# Patient Record
Sex: MALE | Race: WHITE | NOT HISPANIC OR LATINO | URBAN - METROPOLITAN AREA
[De-identification: names, ages, dates, MRNs, and addresses within clinical notes are randomized per-mention and may not be internally consistent; named-entity substitution may affect disease eponyms.]

---

## 2018-10-25 ENCOUNTER — INPATIENT (INPATIENT)
Facility: HOSPITAL | Age: 68
LOS: 0 days | Discharge: HOME | End: 2018-10-26
Admitting: INTERNAL MEDICINE
Payer: MEDICARE

## 2018-10-25 PROCEDURE — 92941 PRQ TRLML REVSC TOT OCCL AMI: CPT | Mod: LC

## 2018-10-25 PROCEDURE — 71275 CT ANGIOGRAPHY CHEST: CPT | Mod: 26

## 2018-10-25 PROCEDURE — 71045 X-RAY EXAM CHEST 1 VIEW: CPT | Mod: 26

## 2018-10-25 PROCEDURE — 74175 CTA ABDOMEN W/CONTRAST: CPT | Mod: 26

## 2018-10-25 PROCEDURE — 99285 EMERGENCY DEPT VISIT HI MDM: CPT

## 2018-10-25 PROCEDURE — 99223 1ST HOSP IP/OBS HIGH 75: CPT

## 2018-10-25 PROCEDURE — 93459 L HRT ART/GRFT ANGIO: CPT | Mod: 26,XU

## 2018-10-26 ENCOUNTER — OUTPATIENT (OUTPATIENT)
Dept: OUTPATIENT SERVICES | Facility: HOSPITAL | Age: 68
LOS: 1 days | End: 2018-10-26

## 2018-10-26 PROCEDURE — 99233 SBSQ HOSP IP/OBS HIGH 50: CPT

## 2018-10-29 ENCOUNTER — NON-APPOINTMENT (OUTPATIENT)
Age: 68
End: 2018-10-29

## 2018-10-29 ENCOUNTER — APPOINTMENT (OUTPATIENT)
Dept: CARDIOLOGY | Facility: CLINIC | Age: 68
End: 2018-10-29
Payer: MEDICARE

## 2018-10-29 VITALS
SYSTOLIC BLOOD PRESSURE: 130 MMHG | WEIGHT: 210 LBS | BODY MASS INDEX: 31.83 KG/M2 | DIASTOLIC BLOOD PRESSURE: 70 MMHG | HEIGHT: 68 IN | HEART RATE: 77 BPM

## 2018-10-29 PROCEDURE — 99214 OFFICE O/P EST MOD 30 MIN: CPT

## 2018-10-29 PROCEDURE — 93000 ELECTROCARDIOGRAM COMPLETE: CPT

## 2018-10-30 ENCOUNTER — RECORD ABSTRACTING (OUTPATIENT)
Age: 68
End: 2018-10-30

## 2018-11-08 ENCOUNTER — OUTPATIENT (OUTPATIENT)
Dept: OUTPATIENT SERVICES | Facility: HOSPITAL | Age: 68
LOS: 1 days | Discharge: HOME | End: 2018-11-08

## 2018-11-12 ENCOUNTER — APPOINTMENT (OUTPATIENT)
Dept: CARDIOLOGY | Facility: CLINIC | Age: 68
End: 2018-11-12
Payer: MEDICARE

## 2018-11-12 PROCEDURE — 93306 TTE W/DOPPLER COMPLETE: CPT

## 2018-11-13 ENCOUNTER — APPOINTMENT (OUTPATIENT)
Dept: CARDIOLOGY | Facility: CLINIC | Age: 68
End: 2018-11-13
Payer: MEDICARE

## 2018-11-13 VITALS
HEART RATE: 87 BPM | WEIGHT: 205 LBS | SYSTOLIC BLOOD PRESSURE: 138 MMHG | BODY MASS INDEX: 31.07 KG/M2 | DIASTOLIC BLOOD PRESSURE: 70 MMHG | HEIGHT: 68 IN | OXYGEN SATURATION: 98 %

## 2018-11-13 PROCEDURE — 99214 OFFICE O/P EST MOD 30 MIN: CPT

## 2018-11-13 PROCEDURE — 93351 STRESS TTE COMPLETE: CPT

## 2018-11-13 NOTE — HISTORY OF PRESENT ILLNESS
[FreeTextEntry1] : CAD: Patient a marked clinical benefit from coronary stenting. He continues to feel well. His good exercise ability without exertional symptoms.\par \par Hypertension: Well controlled.\par \par Hyperlipidemia: The patient states he is taking statin therapy. Will review the bottles

## 2018-11-13 NOTE — PHYSICAL EXAM
[General Appearance - Well Developed] : well developed [Normal Appearance] : normal appearance [Well Groomed] : well groomed [General Appearance - Well Nourished] : well nourished [No Deformities] : no deformities [General Appearance - In No Acute Distress] : no acute distress [Normal Conjunctiva] : the conjunctiva exhibited no abnormalities [Eyelids - No Xanthelasma] : the eyelids demonstrated no xanthelasmas [Normal Oral Mucosa] : normal oral mucosa [No Oral Pallor] : no oral pallor [No Oral Cyanosis] : no oral cyanosis [Normal Jugular Venous A Waves Present] : normal jugular venous A waves present [Normal Jugular Venous V Waves Present] : normal jugular venous V waves present [No Jugular Venous Lopez A Waves] : no jugular venous lopez A waves [] : no respiratory distress [Respiration, Rhythm And Depth] : normal respiratory rhythm and effort [Exaggerated Use Of Accessory Muscles For Inspiration] : no accessory muscle use [Auscultation Breath Sounds / Voice Sounds] : lungs were clear to auscultation bilaterally [Heart Rate And Rhythm] : heart rate and rhythm were normal [Heart Sounds] : normal S1 and S2 [Murmurs] : no murmurs present

## 2019-03-07 ENCOUNTER — CLINICAL ADVICE (OUTPATIENT)
Age: 69
End: 2019-03-07

## 2019-03-09 PROCEDURE — 71045 X-RAY EXAM CHEST 1 VIEW: CPT | Mod: 26

## 2019-03-09 PROCEDURE — 99285 EMERGENCY DEPT VISIT HI MDM: CPT

## 2019-03-10 ENCOUNTER — OUTPATIENT (OUTPATIENT)
Dept: OUTPATIENT SERVICES | Facility: HOSPITAL | Age: 69
LOS: 1 days | End: 2019-03-10

## 2019-03-10 ENCOUNTER — INPATIENT (INPATIENT)
Facility: HOSPITAL | Age: 69
LOS: 1 days | Discharge: HOME | End: 2019-03-12
Payer: MEDICARE

## 2019-03-10 PROCEDURE — 99223 1ST HOSP IP/OBS HIGH 75: CPT

## 2019-03-11 PROCEDURE — 93459 L HRT ART/GRFT ANGIO: CPT | Mod: 26,XU

## 2019-03-11 PROCEDURE — 93010 ELECTROCARDIOGRAM REPORT: CPT

## 2019-03-11 PROCEDURE — 92941 PRQ TRLML REVSC TOT OCCL AMI: CPT | Mod: LC

## 2019-03-12 PROCEDURE — 93010 ELECTROCARDIOGRAM REPORT: CPT

## 2019-03-12 PROCEDURE — 99232 SBSQ HOSP IP/OBS MODERATE 35: CPT

## 2019-03-18 ENCOUNTER — APPOINTMENT (OUTPATIENT)
Dept: CARDIOLOGY | Facility: CLINIC | Age: 69
End: 2019-03-18
Payer: MEDICARE

## 2019-03-18 ENCOUNTER — NON-APPOINTMENT (OUTPATIENT)
Age: 69
End: 2019-03-18

## 2019-03-18 VITALS
SYSTOLIC BLOOD PRESSURE: 126 MMHG | WEIGHT: 205 LBS | HEART RATE: 82 BPM | DIASTOLIC BLOOD PRESSURE: 72 MMHG | BODY MASS INDEX: 31.07 KG/M2 | HEIGHT: 68 IN | OXYGEN SATURATION: 97 %

## 2019-03-18 PROCEDURE — 93000 ELECTROCARDIOGRAM COMPLETE: CPT

## 2019-03-18 PROCEDURE — 99214 OFFICE O/P EST MOD 30 MIN: CPT

## 2019-03-18 NOTE — HISTORY OF PRESENT ILLNESS
[FreeTextEntry1] : CAD: There's been no recurrence of chest discomfort and coronary stenting. The need for compliance of the left physical therapy has been reviewed.\par \par Hyperlipidemia on patient is tolerating statin therapy with difficulty.\par \par Shortness of breath: None recently.\par \par Hypertension: Well controlled

## 2019-03-26 ENCOUNTER — APPOINTMENT (OUTPATIENT)
Dept: CARDIOLOGY | Facility: CLINIC | Age: 69
End: 2019-03-26

## 2019-04-02 ENCOUNTER — OUTPATIENT (OUTPATIENT)
Dept: OUTPATIENT SERVICES | Facility: HOSPITAL | Age: 69
LOS: 1 days | Discharge: HOME | End: 2019-04-02

## 2019-04-22 ENCOUNTER — EMERGENCY (EMERGENCY)
Facility: HOSPITAL | Age: 69
LOS: 1 days | End: 2019-04-22
Admitting: EMERGENCY MEDICINE
Payer: MEDICARE

## 2019-04-22 PROCEDURE — 71045 X-RAY EXAM CHEST 1 VIEW: CPT | Mod: 26

## 2019-04-22 PROCEDURE — 99285 EMERGENCY DEPT VISIT HI MDM: CPT

## 2019-04-22 PROCEDURE — 93010 ELECTROCARDIOGRAM REPORT: CPT | Mod: 76

## 2019-04-29 ENCOUNTER — APPOINTMENT (OUTPATIENT)
Dept: CARDIOLOGY | Facility: CLINIC | Age: 69
End: 2019-04-29
Payer: MEDICARE

## 2019-04-29 VITALS
SYSTOLIC BLOOD PRESSURE: 128 MMHG | HEART RATE: 89 BPM | OXYGEN SATURATION: 98 % | HEIGHT: 68 IN | BODY MASS INDEX: 31.83 KG/M2 | WEIGHT: 210 LBS | DIASTOLIC BLOOD PRESSURE: 70 MMHG

## 2019-04-29 PROCEDURE — 99214 OFFICE O/P EST MOD 30 MIN: CPT

## 2019-04-29 NOTE — HISTORY OF PRESENT ILLNESS
[FreeTextEntry1] : CAD: The patient underwent fairly recent coronary stenting for an unstable angina pattern. In addition the patient has a several years approximately one angina. Now the patient is in rehabilitation. His unstable angina symptoms have not reoccurred. The patient developed exertional angina typical for his baseline state for several years. He was seen in the emergency department. Serial enzymes were negative for myocardial infarction. Today we decided to increase his metoprolol tartrate 25 b.i.d. up to 50 mg p.o. b.i.d.  Pt. reports angina worse after meals, limits functional status after meal walks w friends.\par \par Chol:  cont atorvastatin 80\par \par HTN:  wel controlled

## 2019-05-21 ENCOUNTER — APPOINTMENT (OUTPATIENT)
Dept: CARDIOLOGY | Facility: CLINIC | Age: 69
End: 2019-05-21

## 2019-06-03 ENCOUNTER — APPOINTMENT (OUTPATIENT)
Dept: CARDIOLOGY | Facility: CLINIC | Age: 69
End: 2019-06-03
Payer: MEDICARE

## 2019-06-03 VITALS
HEIGHT: 68 IN | OXYGEN SATURATION: 97 % | WEIGHT: 210 LBS | SYSTOLIC BLOOD PRESSURE: 148 MMHG | HEART RATE: 80 BPM | BODY MASS INDEX: 31.83 KG/M2 | DIASTOLIC BLOOD PRESSURE: 60 MMHG

## 2019-06-03 PROCEDURE — 93000 ELECTROCARDIOGRAM COMPLETE: CPT

## 2019-06-03 PROCEDURE — 99215 OFFICE O/P EST HI 40 MIN: CPT

## 2019-06-03 NOTE — PHYSICAL EXAM
[General Appearance - Well Developed] : well developed [Normal Appearance] : normal appearance [Well Groomed] : well groomed [General Appearance - Well Nourished] : well nourished [No Deformities] : no deformities [General Appearance - In No Acute Distress] : no acute distress [Normal Conjunctiva] : the conjunctiva exhibited no abnormalities [Eyelids - No Xanthelasma] : the eyelids demonstrated no xanthelasmas [Normal Oral Mucosa] : normal oral mucosa [Normal Jugular Venous A Waves Present] : normal jugular venous A waves present [No Oral Pallor] : no oral pallor [No Oral Cyanosis] : no oral cyanosis [No Jugular Venous Lopez A Waves] : no jugular venous lopez A waves [Normal Jugular Venous V Waves Present] : normal jugular venous V waves present [Exaggerated Use Of Accessory Muscles For Inspiration] : no accessory muscle use [Respiration, Rhythm And Depth] : normal respiratory rhythm and effort [Auscultation Breath Sounds / Voice Sounds] : lungs were clear to auscultation bilaterally [Heart Rate And Rhythm] : heart rate and rhythm were normal [Heart Sounds] : normal S1 and S2 [Murmurs] : no murmurs present [Abdomen Soft] : soft [Abdomen Tenderness] : non-tender [Abnormal Walk] : normal gait [Abdomen Mass (___ Cm)] : no abdominal mass palpated [Gait - Sufficient For Exercise Testing] : the gait was sufficient for exercise testing [Nail Clubbing] : no clubbing of the fingernails [Petechial Hemorrhages (___cm)] : no petechial hemorrhages [Cyanosis, Localized] : no localized cyanosis [Skin Color & Pigmentation] : normal skin color and pigmentation [] : no rash [No Skin Ulcers] : no skin ulcer [Skin Lesions] : no skin lesions [No Venous Stasis] : no venous stasis [No Xanthoma] : no  xanthoma was observed [Affect] : the affect was normal [Oriented To Time, Place, And Person] : oriented to person, place, and time [Mood] : the mood was normal [No Anxiety] : not feeling anxious

## 2019-06-04 NOTE — ASSESSMENT
[FreeTextEntry1] :  \par Chronic stable angina.  The patient had last cardiac catheterization on March 11, 2019 and underwent OM stenting.  Overall, he does not have any unstable symptoms at this point.  I have added Ranexa 500 mg b.i.d. and reevaluation in about 10 days.  If that improves symptoms, we will continue; otherwise he will be referred back for cardiac catheterization.  If he has no additional lesion, he will be referred for ECP therapy.\par \par Hypertension, well controlled.  Low-salt diet.  Continue current medication.\par \par Dyslipidemia.  Low-cholesterol diet.  Continue current medication.\par \par Diabetes.  Continue current medication; long-term goals of blood pressure less than 130/80, A1c less than 7, LDL less than 70.\par \par Hypothyroidism.  Continue current medication.\par \par Obesity.  Weight reduction by diet and exercise.\par \par Aggressive risk factor modification has been discussed with him at a great length.  He will be reevaluated by me after cardiac testing.\par

## 2019-06-04 NOTE — HISTORY OF PRESENT ILLNESS
[FreeTextEntry1] : HPI:\par Mr. Crocker is a very pleasant 68-year-old gentleman, came for urgent follow up with significant chest pain.  The patient stated that if he walks more than usual, he gets tightness all across the chest which gets better with resting.  Also after large meal, walking is very difficult due to the symptoms.  The symptoms are now very much disabled.  He does not get any chest pain in the resting condition.  He does not get short of breath in the resting condition, but does get shortness of breath on more than usual exertion.  He denies PND, orthopnea, diaphoresis, dizziness, palpitations, or pedal edema.\par \par Recently, he was sent to emergency room for cardiac rehab as he developed exertional chest pain while getting exercise in cardiac rehab.  In the emergency room, workup was negative and he was sent home for outpatient follow up.  His history includes CAD, last cardiac catheterization on March 11, 2019.  He had OM stenting, previously had RCA stenting, previously he had coronary artery bypass graft surgery LIMA to LAD; dyslipidemia, diabetes, obesity, and hypothyroidism.\par

## 2019-06-12 ENCOUNTER — APPOINTMENT (OUTPATIENT)
Dept: CARDIOLOGY | Facility: CLINIC | Age: 69
End: 2019-06-12
Payer: MEDICARE

## 2019-06-12 VITALS
DIASTOLIC BLOOD PRESSURE: 64 MMHG | OXYGEN SATURATION: 95 % | BODY MASS INDEX: 31.52 KG/M2 | SYSTOLIC BLOOD PRESSURE: 138 MMHG | HEIGHT: 68 IN | HEART RATE: 77 BPM | WEIGHT: 208 LBS

## 2019-06-12 PROCEDURE — 99214 OFFICE O/P EST MOD 30 MIN: CPT

## 2019-07-09 ENCOUNTER — MEDICATION RENEWAL (OUTPATIENT)
Age: 69
End: 2019-07-09

## 2019-07-15 ENCOUNTER — APPOINTMENT (OUTPATIENT)
Dept: CARDIOLOGY | Facility: CLINIC | Age: 69
End: 2019-07-15

## 2019-07-17 ENCOUNTER — OUTPATIENT (OUTPATIENT)
Dept: OUTPATIENT SERVICES | Facility: HOSPITAL | Age: 69
LOS: 1 days | End: 2019-07-17

## 2019-07-17 ENCOUNTER — INPATIENT (INPATIENT)
Facility: HOSPITAL | Age: 69
LOS: 0 days | Discharge: HOME | End: 2019-07-18
Attending: INTERNAL MEDICINE | Admitting: INTERNAL MEDICINE
Payer: MEDICARE

## 2019-07-17 PROCEDURE — 93010 ELECTROCARDIOGRAM REPORT: CPT | Mod: 59

## 2019-07-17 PROCEDURE — 99285 EMERGENCY DEPT VISIT HI MDM: CPT

## 2019-07-17 PROCEDURE — 92928 PRQ TCAT PLMT NTRAC ST 1 LES: CPT | Mod: LC

## 2019-07-17 PROCEDURE — 71045 X-RAY EXAM CHEST 1 VIEW: CPT | Mod: 26

## 2019-07-17 PROCEDURE — 93459 L HRT ART/GRFT ANGIO: CPT | Mod: 26,XU

## 2019-07-18 PROCEDURE — 93010 ELECTROCARDIOGRAM REPORT: CPT

## 2019-07-22 ENCOUNTER — APPOINTMENT (OUTPATIENT)
Dept: CARDIOLOGY | Facility: CLINIC | Age: 69
End: 2019-07-22

## 2019-07-23 ENCOUNTER — APPOINTMENT (OUTPATIENT)
Dept: CARDIOLOGY | Facility: CLINIC | Age: 69
End: 2019-07-23
Payer: MEDICARE

## 2019-07-23 ENCOUNTER — NON-APPOINTMENT (OUTPATIENT)
Age: 69
End: 2019-07-23

## 2019-07-23 VITALS
WEIGHT: 207 LBS | HEART RATE: 82 BPM | BODY MASS INDEX: 31.37 KG/M2 | DIASTOLIC BLOOD PRESSURE: 70 MMHG | SYSTOLIC BLOOD PRESSURE: 142 MMHG | HEIGHT: 68 IN | OXYGEN SATURATION: 98 %

## 2019-07-23 PROCEDURE — 93000 ELECTROCARDIOGRAM COMPLETE: CPT

## 2019-07-23 PROCEDURE — 99215 OFFICE O/P EST HI 40 MIN: CPT

## 2019-07-23 RX ORDER — NIFEDIPINE 10 MG/1
10 CAPSULE ORAL DAILY
Qty: 90 | Refills: 2 | Status: DISCONTINUED | COMMUNITY
End: 2019-07-23

## 2019-07-23 RX ORDER — METOPROLOL TARTRATE 50 MG/1
50 TABLET, FILM COATED ORAL TWICE DAILY
Qty: 180 | Refills: 3 | Status: DISCONTINUED | COMMUNITY
Start: 2019-04-29 | End: 2019-07-23

## 2019-07-23 NOTE — HISTORY OF PRESENT ILLNESS
[FreeTextEntry1] : \par he has no chest pain\par He has no shortness of breath\par He has no palpitations\par He has no syncope\par He is neurologically intact\par He has no edema\par He has no GI symptoms\par

## 2019-07-23 NOTE — PHYSICAL EXAM
[Normal Appearance] : normal appearance [General Appearance - Well Developed] : well developed [General Appearance - Well Nourished] : well nourished [Well Groomed] : well groomed [No Deformities] : no deformities [General Appearance - In No Acute Distress] : no acute distress [Normal Conjunctiva] : the conjunctiva exhibited no abnormalities [Eyelids - No Xanthelasma] : the eyelids demonstrated no xanthelasmas [Normal Oral Mucosa] : normal oral mucosa [No Oral Pallor] : no oral pallor [No Oral Cyanosis] : no oral cyanosis [Normal Jugular Venous A Waves Present] : normal jugular venous A waves present [No Jugular Venous Lopez A Waves] : no jugular venous lopez A waves [Normal Jugular Venous V Waves Present] : normal jugular venous V waves present [Auscultation Breath Sounds / Voice Sounds] : lungs were clear to auscultation bilaterally [Exaggerated Use Of Accessory Muscles For Inspiration] : no accessory muscle use [Respiration, Rhythm And Depth] : normal respiratory rhythm and effort [Heart Sounds] : normal S1 and S2 [Heart Rate And Rhythm] : heart rate and rhythm were normal [Murmurs] : no murmurs present [Abdomen Tenderness] : non-tender [Abdomen Soft] : soft [Abdomen Mass (___ Cm)] : no abdominal mass palpated [Abnormal Walk] : normal gait [Nail Clubbing] : no clubbing of the fingernails [Gait - Sufficient For Exercise Testing] : the gait was sufficient for exercise testing [Petechial Hemorrhages (___cm)] : no petechial hemorrhages [Cyanosis, Localized] : no localized cyanosis [Skin Color & Pigmentation] : normal skin color and pigmentation [No Venous Stasis] : no venous stasis [] : no rash [Skin Lesions] : no skin lesions [No Skin Ulcers] : no skin ulcer [No Xanthoma] : no  xanthoma was observed [Oriented To Time, Place, And Person] : oriented to person, place, and time [Affect] : the affect was normal [No Anxiety] : not feeling anxious [Mood] : the mood was normal

## 2019-07-23 NOTE — REASON FOR VISIT
[Hyperlipidemia] : hyperlipidemia [Coronary Artery Disease] : coronary artery disease [Follow-Up - From Hospitalization] : follow-up of a recent hospitalization for [Hypertension] : hypertension [FreeTextEntry1] : I saw this 68-year-old man,in follow up on 07/23/19\par Last week he presented to the hospital with unstable angina, and had a stent to the ostial LCX. He is now pain free.\par \par He was seen on 6/3/19 for significant chest pain.  The patient stated that if he walks more than usual, he gets tightness all across the chest which gets better with resting.  Also after large meal, walking is very difficult due to the symptoms.  The symptoms are now very much disabled.  He does not get any chest pain in the resting condition.  He does not get short of breath in the resting condition, but does get shortness of breath on more than usual exertion.  He denies PND, orthopnea, diaphoresis, dizziness, palpitations, or pedal edema. \par \par He was started on Ranexa and he has significant improvement in his chronic stable angina; he can do all his activity without any CP.\par \par \par His history includes CAD, last cardiac catheterization on March 11, 2019.  He had OM stenting, previously had RCA stenting, previously he had coronary artery bypass graft surgery LIMA to LAD; dyslipidemia, diabetes, obesity, and hypothyroidism.\par

## 2019-07-25 ENCOUNTER — TRANSCRIPTION ENCOUNTER (OUTPATIENT)
Age: 69
End: 2019-07-25

## 2019-07-26 ENCOUNTER — TRANSCRIPTION ENCOUNTER (OUTPATIENT)
Age: 69
End: 2019-07-26

## 2019-09-10 ENCOUNTER — OUTPATIENT (OUTPATIENT)
Dept: OUTPATIENT SERVICES | Facility: HOSPITAL | Age: 69
LOS: 1 days | Discharge: HOME | End: 2019-09-10

## 2019-09-20 ENCOUNTER — APPOINTMENT (OUTPATIENT)
Dept: CARDIOLOGY | Facility: CLINIC | Age: 69
End: 2019-09-20

## 2019-10-03 ENCOUNTER — APPOINTMENT (OUTPATIENT)
Dept: CARDIOLOGY | Facility: CLINIC | Age: 69
End: 2019-10-03
Payer: MEDICARE

## 2019-10-03 ENCOUNTER — NON-APPOINTMENT (OUTPATIENT)
Age: 69
End: 2019-10-03

## 2019-10-03 VITALS
HEART RATE: 86 BPM | HEIGHT: 68 IN | WEIGHT: 199 LBS | SYSTOLIC BLOOD PRESSURE: 136 MMHG | BODY MASS INDEX: 30.16 KG/M2 | OXYGEN SATURATION: 97 % | DIASTOLIC BLOOD PRESSURE: 70 MMHG

## 2019-10-03 PROCEDURE — 93000 ELECTROCARDIOGRAM COMPLETE: CPT

## 2019-10-03 PROCEDURE — 99215 OFFICE O/P EST HI 40 MIN: CPT

## 2019-10-03 RX ORDER — DAPAGLIFLOZIN 10 MG/1
TABLET, FILM COATED ORAL
Refills: 0 | Status: DISCONTINUED | COMMUNITY
End: 2019-10-03

## 2019-10-03 NOTE — PHYSICAL EXAM
[General Appearance - Well Developed] : well developed [Normal Appearance] : normal appearance [Well Groomed] : well groomed [General Appearance - Well Nourished] : well nourished [No Deformities] : no deformities [General Appearance - In No Acute Distress] : no acute distress [Normal Conjunctiva] : the conjunctiva exhibited no abnormalities [Eyelids - No Xanthelasma] : the eyelids demonstrated no xanthelasmas [No Oral Pallor] : no oral pallor [Normal Oral Mucosa] : normal oral mucosa [No Oral Cyanosis] : no oral cyanosis [Normal Jugular Venous V Waves Present] : normal jugular venous V waves present [Normal Jugular Venous A Waves Present] : normal jugular venous A waves present [No Jugular Venous Lopez A Waves] : no jugular venous lopez A waves [Respiration, Rhythm And Depth] : normal respiratory rhythm and effort [Exaggerated Use Of Accessory Muscles For Inspiration] : no accessory muscle use [Auscultation Breath Sounds / Voice Sounds] : lungs were clear to auscultation bilaterally [Heart Sounds] : normal S1 and S2 [Heart Rate And Rhythm] : heart rate and rhythm were normal [Murmurs] : no murmurs present [Abdomen Soft] : soft [Abdomen Tenderness] : non-tender [Abdomen Mass (___ Cm)] : no abdominal mass palpated [Gait - Sufficient For Exercise Testing] : the gait was sufficient for exercise testing [Abnormal Walk] : normal gait [Nail Clubbing] : no clubbing of the fingernails [Cyanosis, Localized] : no localized cyanosis [Petechial Hemorrhages (___cm)] : no petechial hemorrhages [Skin Color & Pigmentation] : normal skin color and pigmentation [] : no rash [No Venous Stasis] : no venous stasis [Skin Lesions] : no skin lesions [No Xanthoma] : no  xanthoma was observed [No Skin Ulcers] : no skin ulcer [Oriented To Time, Place, And Person] : oriented to person, place, and time [Affect] : the affect was normal [Mood] : the mood was normal [No Anxiety] : not feeling anxious

## 2019-10-03 NOTE — DISCUSSION/SUMMARY
[FreeTextEntry1] : Since stenting of the circumflex /left main lesion last week he has been completely pain free.\par We discussed treating his diabetes and cholesterol more aggressively and I added Zetia to his regime.\par He has been having post prandial epigastric symptoms which have been diagnosed as GB stones.\par I have referred him to a Gastroenterologist locally.\par If surgery is required can clear him but he cannot stop the aspirin and plavix.

## 2019-10-14 ENCOUNTER — INPATIENT (INPATIENT)
Facility: HOSPITAL | Age: 69
LOS: 1 days | Discharge: HOME | DRG: 250 | End: 2019-10-16
Attending: HOSPITALIST | Admitting: HOSPITALIST
Payer: MEDICARE

## 2019-10-14 VITALS
WEIGHT: 194.01 LBS | HEART RATE: 110 BPM | HEIGHT: 69 IN | DIASTOLIC BLOOD PRESSURE: 76 MMHG | SYSTOLIC BLOOD PRESSURE: 145 MMHG | OXYGEN SATURATION: 100 % | RESPIRATION RATE: 18 BRPM

## 2019-10-14 DIAGNOSIS — R07.9 CHEST PAIN, UNSPECIFIED: ICD-10-CM

## 2019-10-14 LAB
ALBUMIN SERPL ELPH-MCNC: 4.1 G/DL — SIGNIFICANT CHANGE UP (ref 3.3–5)
ALP SERPL-CCNC: 71 U/L — SIGNIFICANT CHANGE UP (ref 40–120)
ALT FLD-CCNC: 47 U/L — HIGH (ref 10–45)
ANION GAP SERPL CALC-SCNC: 13 MMOL/L — SIGNIFICANT CHANGE UP (ref 5–17)
AST SERPL-CCNC: 25 U/L — SIGNIFICANT CHANGE UP (ref 10–40)
BASOPHILS # BLD AUTO: 0.04 K/UL — SIGNIFICANT CHANGE UP (ref 0–0.2)
BASOPHILS NFR BLD AUTO: 0.8 % — SIGNIFICANT CHANGE UP (ref 0–2)
BILIRUB SERPL-MCNC: 0.5 MG/DL — SIGNIFICANT CHANGE UP (ref 0.2–1.2)
BUN SERPL-MCNC: 22 MG/DL — SIGNIFICANT CHANGE UP (ref 7–23)
CALCIUM SERPL-MCNC: 8.7 MG/DL — SIGNIFICANT CHANGE UP (ref 8.4–10.5)
CHLORIDE SERPL-SCNC: 102 MMOL/L — SIGNIFICANT CHANGE UP (ref 96–108)
CO2 SERPL-SCNC: 23 MMOL/L — SIGNIFICANT CHANGE UP (ref 22–31)
CREAT SERPL-MCNC: 1.13 MG/DL — SIGNIFICANT CHANGE UP (ref 0.5–1.3)
EOSINOPHIL # BLD AUTO: 0.1 K/UL — SIGNIFICANT CHANGE UP (ref 0–0.5)
EOSINOPHIL NFR BLD AUTO: 2 % — SIGNIFICANT CHANGE UP (ref 0–6)
GLUCOSE SERPL-MCNC: 222 MG/DL — HIGH (ref 70–99)
HCT VFR BLD CALC: 40.4 % — SIGNIFICANT CHANGE UP (ref 39–50)
HGB BLD-MCNC: 13.7 G/DL — SIGNIFICANT CHANGE UP (ref 13–17)
IMM GRANULOCYTES NFR BLD AUTO: 0.4 % — SIGNIFICANT CHANGE UP (ref 0–1.5)
LYMPHOCYTES # BLD AUTO: 1.02 K/UL — SIGNIFICANT CHANGE UP (ref 1–3.3)
LYMPHOCYTES # BLD AUTO: 20.4 % — SIGNIFICANT CHANGE UP (ref 13–44)
MCHC RBC-ENTMCNC: 31.2 PG — SIGNIFICANT CHANGE UP (ref 27–34)
MCHC RBC-ENTMCNC: 33.9 GM/DL — SIGNIFICANT CHANGE UP (ref 32–36)
MCV RBC AUTO: 92 FL — SIGNIFICANT CHANGE UP (ref 80–100)
MONOCYTES # BLD AUTO: 0.5 K/UL — SIGNIFICANT CHANGE UP (ref 0–0.9)
MONOCYTES NFR BLD AUTO: 10 % — SIGNIFICANT CHANGE UP (ref 2–14)
NEUTROPHILS # BLD AUTO: 3.33 K/UL — SIGNIFICANT CHANGE UP (ref 1.8–7.4)
NEUTROPHILS NFR BLD AUTO: 66.4 % — SIGNIFICANT CHANGE UP (ref 43–77)
NRBC # BLD: 0 /100 WBCS — SIGNIFICANT CHANGE UP (ref 0–0)
PLATELET # BLD AUTO: 176 K/UL — SIGNIFICANT CHANGE UP (ref 150–400)
POTASSIUM SERPL-MCNC: 3.8 MMOL/L — SIGNIFICANT CHANGE UP (ref 3.5–5.3)
POTASSIUM SERPL-SCNC: 3.8 MMOL/L — SIGNIFICANT CHANGE UP (ref 3.5–5.3)
PROT SERPL-MCNC: 7.1 G/DL — SIGNIFICANT CHANGE UP (ref 6–8.3)
RBC # BLD: 4.39 M/UL — SIGNIFICANT CHANGE UP (ref 4.2–5.8)
RBC # FLD: 12.9 % — SIGNIFICANT CHANGE UP (ref 10.3–14.5)
SODIUM SERPL-SCNC: 138 MMOL/L — SIGNIFICANT CHANGE UP (ref 135–145)
TROPONIN T, HIGH SENSITIVITY RESULT: 94 NG/L — HIGH (ref 0–51)
WBC # BLD: 5.01 K/UL — SIGNIFICANT CHANGE UP (ref 3.8–10.5)
WBC # FLD AUTO: 5.01 K/UL — SIGNIFICANT CHANGE UP (ref 3.8–10.5)

## 2019-10-14 PROCEDURE — 99291 CRITICAL CARE FIRST HOUR: CPT | Mod: GC

## 2019-10-14 RX ORDER — MORPHINE SULFATE 50 MG/1
4 CAPSULE, EXTENDED RELEASE ORAL ONCE
Refills: 0 | Status: DISCONTINUED | OUTPATIENT
Start: 2019-10-14 | End: 2019-10-14

## 2019-10-14 RX ORDER — NITROGLYCERIN 6.5 MG
0.4 CAPSULE, EXTENDED RELEASE ORAL ONCE
Refills: 0 | Status: COMPLETED | OUTPATIENT
Start: 2019-10-14 | End: 2019-10-14

## 2019-10-14 RX ADMIN — MORPHINE SULFATE 4 MILLIGRAM(S): 50 CAPSULE, EXTENDED RELEASE ORAL at 23:25

## 2019-10-14 RX ADMIN — MORPHINE SULFATE 4 MILLIGRAM(S): 50 CAPSULE, EXTENDED RELEASE ORAL at 22:47

## 2019-10-14 RX ADMIN — Medication 0.4 MILLIGRAM(S): at 22:48

## 2019-10-14 NOTE — ED ADULT NURSE NOTE - NSIMPLEMENTINTERV_GEN_ALL_ED
Implemented All Fall with Harm Risk Interventions:  [place] to call system. Call bell, personal items and telephone within reach. Instruct patient to call for assistance. Room bathroom lighting operational. Non-slip footwear when patient is off stretcher. Physically safe environment: no spills, clutter or unnecessary equipment. Stretcher in lowest position, wheels locked, appropriate side rails in place. Provide visual cue, wrist band, yellow gown, etc. Monitor gait and stability. Monitor for mental status changes and reorient to person, place, and time. Review medications for side effects contributing to fall risk. Reinforce activity limits and safety measures with patient and family. Provide visual clues: red socks.

## 2019-10-14 NOTE — ED PROVIDER NOTE - CLINICAL SUMMARY MEDICAL DECISION MAKING FREE TEXT BOX
Pt is 68y Male with significant PMH of heart disease, presentign with new onset chest pressure, diaphoresis, abnormal ekg, with ST depression laterally and no R wave progession in V1 and V2.  Strongly susspect acute MI.  Will do stat chest x-ray to rule out dissection.  Nitro and morphine plavix and brelinta,  Cardiology consulted.  Admission.  ZR

## 2019-10-14 NOTE — ED PROVIDER NOTE - PROGRESS NOTE DETAILS
cardiology called, case discussed will see pt in ER patient refusing heparin/brillinta load at this time. (patient already taking brillinta). cardiology evaluated patient. not concerned for STEMI and needing cath at this time.

## 2019-10-14 NOTE — ED PROVIDER NOTE - CRITICAL CARE PROVIDED
documentation/direct patient care (not related to procedure)/cardiology/consultation with other physicians/interpretation of diagnostic studies

## 2019-10-14 NOTE — ED PROVIDER NOTE - NS ED ROS FT
General: pt in apparent distress. diaphoretic, holding chest  HEENT: EOMI, PERRLA, normal mucosa, normal oropharynx, no lesions on the lips or on oral mucosa, normal external ear  Neck: supple, no lymphadenopathy, full range of motion, no nuchal rigidity  CV: Sinus tach, normal S1 and S2 with no murmur, capillary refill less than two seconds,   Resp: lungs CTA b/l, good aeration bilaterally, symmetric chest wall   Abd: non-distended, soft, non-tender  : no CVA tenderness  MSK: full range of motion, no cyanosis, no edema, no clubbing, no immobility  Neuro: cranial nerves intact, muscle strength 5/5 in all extremities, normal gait  Skin: diaphoretic

## 2019-10-14 NOTE — ED ADULT NURSE NOTE - OBJECTIVE STATEMENT
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 he took an aleeve and the chest pain didn't improve. when we arrived, we gave him x4 baby aspirin and the pain didn't improve." pt endorsing generalized chest pain that radiates to the back, SOB, and epigastric pain at present. pt denies any lightheadedness, dizziness, vomiting/diarrhea, numbness/tingling at present.

## 2019-10-14 NOTE — ED PROVIDER NOTE - OBJECTIVE STATEMENT
Pt is 68y male with PMH CAD, HTN, DM, s/p CABG 22 years ago and several stents, cc of substernal chest pain for 1 hour at rest.  Patient has pain radiating to back, 5/10 with SOB radiating to epigastrium.  Patient is diaphoretic.  Patient was given nitroglycerin and ASA by EMS to some relief. Pt is 68y male with PMH CAD, HTN, DM, s/p CABG 22 years ago and several stents, cc of substernal chest pain for 1 hour at rest.  Patient has pain radiating to back, 5/10 with SOB radiating to epigastrium.  Patient is diaphoretic.  Patient was given nitroglycerin and ASA by EMS to some relief .he is already on Brilinta since last stent 4 mo ago

## 2019-10-15 DIAGNOSIS — I21.4 NON-ST ELEVATION (NSTEMI) MYOCARDIAL INFARCTION: ICD-10-CM

## 2019-10-15 DIAGNOSIS — I10 ESSENTIAL (PRIMARY) HYPERTENSION: ICD-10-CM

## 2019-10-15 DIAGNOSIS — Z95.1 PRESENCE OF AORTOCORONARY BYPASS GRAFT: Chronic | ICD-10-CM

## 2019-10-15 DIAGNOSIS — E78.5 HYPERLIPIDEMIA, UNSPECIFIED: ICD-10-CM

## 2019-10-15 DIAGNOSIS — Z29.9 ENCOUNTER FOR PROPHYLACTIC MEASURES, UNSPECIFIED: ICD-10-CM

## 2019-10-15 DIAGNOSIS — E11.9 TYPE 2 DIABETES MELLITUS WITHOUT COMPLICATIONS: ICD-10-CM

## 2019-10-15 DIAGNOSIS — K21.9 GASTRO-ESOPHAGEAL REFLUX DISEASE WITHOUT ESOPHAGITIS: ICD-10-CM

## 2019-10-15 LAB
BASOPHILS # BLD AUTO: 0.04 K/UL — SIGNIFICANT CHANGE UP (ref 0–0.2)
BASOPHILS NFR BLD AUTO: 0.8 % — SIGNIFICANT CHANGE UP (ref 0–2)
CK MB BLD-MCNC: 10.4 % — HIGH (ref 0–3.5)
CK MB BLD-MCNC: 10.6 % — HIGH (ref 0–3.5)
CK MB BLD-MCNC: 5.4 % — HIGH (ref 0–3.5)
CK MB CFR SERPL CALC: 11 NG/ML — HIGH (ref 0–6.7)
CK MB CFR SERPL CALC: 36 NG/ML — HIGH (ref 0–6.7)
CK MB CFR SERPL CALC: 47.8 NG/ML — HIGH (ref 0–6.7)
CK SERPL-CCNC: 203 U/L — HIGH (ref 30–200)
CK SERPL-CCNC: 340 U/L — HIGH (ref 30–200)
CK SERPL-CCNC: 458 U/L — HIGH (ref 30–200)
EOSINOPHIL # BLD AUTO: 0.07 K/UL — SIGNIFICANT CHANGE UP (ref 0–0.5)
EOSINOPHIL NFR BLD AUTO: 1.4 % — SIGNIFICANT CHANGE UP (ref 0–6)
GLUCOSE BLDC GLUCOMTR-MCNC: 105 MG/DL — HIGH (ref 70–99)
GLUCOSE BLDC GLUCOMTR-MCNC: 118 MG/DL — HIGH (ref 70–99)
GLUCOSE BLDC GLUCOMTR-MCNC: 122 MG/DL — HIGH (ref 70–99)
GLUCOSE BLDC GLUCOMTR-MCNC: 145 MG/DL — HIGH (ref 70–99)
GLUCOSE BLDC GLUCOMTR-MCNC: 146 MG/DL — HIGH (ref 70–99)
HBA1C BLD-MCNC: 6.7 % — HIGH (ref 4–5.6)
HCT VFR BLD CALC: 41.1 % — SIGNIFICANT CHANGE UP (ref 39–50)
HGB BLD-MCNC: 13.7 G/DL — SIGNIFICANT CHANGE UP (ref 13–17)
IMM GRANULOCYTES NFR BLD AUTO: 0.2 % — SIGNIFICANT CHANGE UP (ref 0–1.5)
LYMPHOCYTES # BLD AUTO: 1.33 K/UL — SIGNIFICANT CHANGE UP (ref 1–3.3)
LYMPHOCYTES # BLD AUTO: 26.5 % — SIGNIFICANT CHANGE UP (ref 13–44)
MCHC RBC-ENTMCNC: 31.3 PG — SIGNIFICANT CHANGE UP (ref 27–34)
MCHC RBC-ENTMCNC: 33.3 GM/DL — SIGNIFICANT CHANGE UP (ref 32–36)
MCV RBC AUTO: 93.8 FL — SIGNIFICANT CHANGE UP (ref 80–100)
MONOCYTES # BLD AUTO: 0.52 K/UL — SIGNIFICANT CHANGE UP (ref 0–0.9)
MONOCYTES NFR BLD AUTO: 10.4 % — SIGNIFICANT CHANGE UP (ref 2–14)
NEUTROPHILS # BLD AUTO: 3.05 K/UL — SIGNIFICANT CHANGE UP (ref 1.8–7.4)
NEUTROPHILS NFR BLD AUTO: 60.7 % — SIGNIFICANT CHANGE UP (ref 43–77)
PLATELET # BLD AUTO: 173 K/UL — SIGNIFICANT CHANGE UP (ref 150–400)
RBC # BLD: 4.38 M/UL — SIGNIFICANT CHANGE UP (ref 4.2–5.8)
RBC # FLD: 12.9 % — SIGNIFICANT CHANGE UP (ref 10.3–14.5)
TROPONIN T, HIGH SENSITIVITY RESULT: 178 NG/L — HIGH (ref 0–51)
TROPONIN T, HIGH SENSITIVITY RESULT: 425 NG/L — HIGH (ref 0–51)
TROPONIN T, HIGH SENSITIVITY RESULT: 666 NG/L — HIGH (ref 0–51)
WBC # BLD: 5.02 K/UL — SIGNIFICANT CHANGE UP (ref 3.8–10.5)
WBC # FLD AUTO: 5.02 K/UL — SIGNIFICANT CHANGE UP (ref 3.8–10.5)

## 2019-10-15 PROCEDURE — 12345: CPT | Mod: NC

## 2019-10-15 PROCEDURE — 99223 1ST HOSP IP/OBS HIGH 75: CPT

## 2019-10-15 PROCEDURE — 92941 PRQ TRLML REVSC TOT OCCL AMI: CPT | Mod: LC,GC

## 2019-10-15 PROCEDURE — 99223 1ST HOSP IP/OBS HIGH 75: CPT | Mod: GC

## 2019-10-15 PROCEDURE — 71045 X-RAY EXAM CHEST 1 VIEW: CPT | Mod: 26

## 2019-10-15 PROCEDURE — 99152 MOD SED SAME PHYS/QHP 5/>YRS: CPT | Mod: GC

## 2019-10-15 PROCEDURE — 93010 ELECTROCARDIOGRAM REPORT: CPT | Mod: 59

## 2019-10-15 PROCEDURE — 93455 CORONARY ART/GRFT ANGIO S&I: CPT | Mod: 26,59,GC

## 2019-10-15 RX ORDER — RANOLAZINE 500 MG/1
500 TABLET, FILM COATED, EXTENDED RELEASE ORAL
Refills: 0 | Status: DISCONTINUED | OUTPATIENT
Start: 2019-10-15 | End: 2019-10-16

## 2019-10-15 RX ORDER — RANOLAZINE 500 MG/1
1 TABLET, FILM COATED, EXTENDED RELEASE ORAL
Qty: 0 | Refills: 0 | DISCHARGE

## 2019-10-15 RX ORDER — ATORVASTATIN CALCIUM 80 MG/1
80 TABLET, FILM COATED ORAL AT BEDTIME
Refills: 0 | Status: DISCONTINUED | OUTPATIENT
Start: 2019-10-15 | End: 2019-10-16

## 2019-10-15 RX ORDER — EZETIMIBE 10 MG/1
10 TABLET ORAL DAILY
Refills: 0 | Status: DISCONTINUED | OUTPATIENT
Start: 2019-10-15 | End: 2019-10-16

## 2019-10-15 RX ORDER — DEXTROSE 50 % IN WATER 50 %
25 SYRINGE (ML) INTRAVENOUS ONCE
Refills: 0 | Status: DISCONTINUED | OUTPATIENT
Start: 2019-10-15 | End: 2019-10-16

## 2019-10-15 RX ORDER — METOPROLOL TARTRATE 50 MG
25 TABLET ORAL
Refills: 0 | Status: DISCONTINUED | OUTPATIENT
Start: 2019-10-15 | End: 2019-10-16

## 2019-10-15 RX ORDER — INSULIN LISPRO 100/ML
VIAL (ML) SUBCUTANEOUS
Refills: 0 | Status: DISCONTINUED | OUTPATIENT
Start: 2019-10-15 | End: 2019-10-16

## 2019-10-15 RX ORDER — DEXTROSE 50 % IN WATER 50 %
12.5 SYRINGE (ML) INTRAVENOUS ONCE
Refills: 0 | Status: DISCONTINUED | OUTPATIENT
Start: 2019-10-15 | End: 2019-10-16

## 2019-10-15 RX ORDER — SODIUM CHLORIDE 9 MG/ML
1000 INJECTION, SOLUTION INTRAVENOUS
Refills: 0 | Status: DISCONTINUED | OUTPATIENT
Start: 2019-10-15 | End: 2019-10-16

## 2019-10-15 RX ORDER — ASPIRIN/CALCIUM CARB/MAGNESIUM 324 MG
81 TABLET ORAL DAILY
Refills: 0 | Status: DISCONTINUED | OUTPATIENT
Start: 2019-10-15 | End: 2019-10-16

## 2019-10-15 RX ORDER — AMLODIPINE BESYLATE 2.5 MG/1
5 TABLET ORAL DAILY
Refills: 0 | Status: DISCONTINUED | OUTPATIENT
Start: 2019-10-15 | End: 2019-10-16

## 2019-10-15 RX ORDER — PANTOPRAZOLE SODIUM 20 MG/1
40 TABLET, DELAYED RELEASE ORAL ONCE
Refills: 0 | Status: COMPLETED | OUTPATIENT
Start: 2019-10-15 | End: 2019-10-15

## 2019-10-15 RX ORDER — GLUCAGON INJECTION, SOLUTION 0.5 MG/.1ML
1 INJECTION, SOLUTION SUBCUTANEOUS ONCE
Refills: 0 | Status: DISCONTINUED | OUTPATIENT
Start: 2019-10-15 | End: 2019-10-16

## 2019-10-15 RX ORDER — TICAGRELOR 90 MG/1
90 TABLET ORAL
Refills: 0 | Status: DISCONTINUED | OUTPATIENT
Start: 2019-10-15 | End: 2019-10-16

## 2019-10-15 RX ORDER — HEPARIN SODIUM 5000 [USP'U]/ML
5000 INJECTION INTRAVENOUS; SUBCUTANEOUS EVERY 8 HOURS
Refills: 0 | Status: DISCONTINUED | OUTPATIENT
Start: 2019-10-15 | End: 2019-10-16

## 2019-10-15 RX ORDER — INSULIN LISPRO 100/ML
VIAL (ML) SUBCUTANEOUS AT BEDTIME
Refills: 0 | Status: DISCONTINUED | OUTPATIENT
Start: 2019-10-15 | End: 2019-10-16

## 2019-10-15 RX ORDER — INSULIN GLARGINE 100 [IU]/ML
25 INJECTION, SOLUTION SUBCUTANEOUS AT BEDTIME
Refills: 0 | Status: DISCONTINUED | OUTPATIENT
Start: 2019-10-15 | End: 2019-10-16

## 2019-10-15 RX ORDER — LISINOPRIL 2.5 MG/1
40 TABLET ORAL DAILY
Refills: 0 | Status: DISCONTINUED | OUTPATIENT
Start: 2019-10-15 | End: 2019-10-16

## 2019-10-15 RX ORDER — DEXTROSE 50 % IN WATER 50 %
15 SYRINGE (ML) INTRAVENOUS ONCE
Refills: 0 | Status: DISCONTINUED | OUTPATIENT
Start: 2019-10-15 | End: 2019-10-16

## 2019-10-15 RX ADMIN — RANOLAZINE 500 MILLIGRAM(S): 500 TABLET, FILM COATED, EXTENDED RELEASE ORAL at 18:03

## 2019-10-15 RX ADMIN — TICAGRELOR 90 MILLIGRAM(S): 90 TABLET ORAL at 07:16

## 2019-10-15 RX ADMIN — LISINOPRIL 40 MILLIGRAM(S): 2.5 TABLET ORAL at 07:16

## 2019-10-15 RX ADMIN — PANTOPRAZOLE SODIUM 40 MILLIGRAM(S): 20 TABLET, DELAYED RELEASE ORAL at 01:14

## 2019-10-15 RX ADMIN — EZETIMIBE 10 MILLIGRAM(S): 10 TABLET ORAL at 18:03

## 2019-10-15 RX ADMIN — RANOLAZINE 500 MILLIGRAM(S): 500 TABLET, FILM COATED, EXTENDED RELEASE ORAL at 07:16

## 2019-10-15 RX ADMIN — ATORVASTATIN CALCIUM 80 MILLIGRAM(S): 80 TABLET, FILM COATED ORAL at 21:09

## 2019-10-15 RX ADMIN — Medication 25 MILLIGRAM(S): at 18:03

## 2019-10-15 RX ADMIN — Medication 25 MILLIGRAM(S): at 07:16

## 2019-10-15 RX ADMIN — HEPARIN SODIUM 5000 UNIT(S): 5000 INJECTION INTRAVENOUS; SUBCUTANEOUS at 07:16

## 2019-10-15 RX ADMIN — AMLODIPINE BESYLATE 5 MILLIGRAM(S): 2.5 TABLET ORAL at 07:16

## 2019-10-15 RX ADMIN — TICAGRELOR 90 MILLIGRAM(S): 90 TABLET ORAL at 21:09

## 2019-10-15 RX ADMIN — INSULIN GLARGINE 25 UNIT(S): 100 INJECTION, SOLUTION SUBCUTANEOUS at 21:09

## 2019-10-15 RX ADMIN — Medication 81 MILLIGRAM(S): at 11:07

## 2019-10-15 NOTE — H&P ADULT - NSHPSOCIALHISTORY_GEN_ALL_CORE
never tobacco, social alcohol, no drugs. 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

## 2019-10-15 NOTE — H&P ADULT - PROBLEM SELECTOR PLAN 1
positive troponin, dynamic EKG changes as above, severe angina (responding to ASA and nitro)  - cards fellow consulted by ED w/ note pending. Called to discuss however currently occupied with CCU patient and left message with CCU NP regarding EKG changes.  - patient s/p asa load however declining ticagrelor load and heparin infusion wanting to trend blood work and have reevaluation with cards before initiating.  - FRANCHESCA score 6 (Age>65, >/=3 CAD risk factors, known CAD, ASA use within 7d, severe angina x2 in 24hr, positive cardiac enzymes)  - continue w/ tele monitoring and troponin trend w/ ck/ckmb q4hr  - c/w asa, ticagrelor, metoprolol, ace-i, statin positive troponin, dynamic EKG changes as above, severe angina (responding to ASA and nitro)  - cards fellow consulted by ED w/ note pending. Discussed case with overnight cards fellow and plan is likely cath in am  - patient s/p asa load however declining ticagrelor load and heparin infusion wanting to trend blood work and have reevaluation with cards before initiating.  - FRANCHESCA score 6 (Age>65, >/=3 CAD risk factors, known CAD, ASA use within 7d, severe angina x2 in 24hr, positive cardiac enzymes)  - continue w/ tele monitoring and troponin trend w/ ck/ckmb q4hr  - c/w asa, ticagrelor, metoprolol, ace-i, statin

## 2019-10-15 NOTE — H&P ADULT - NSICDXPASTMEDICALHX_GEN_ALL_CORE_FT
PAST MEDICAL HISTORY:  CAD, multiple vessel     Diabetes mellitus     HLD (hyperlipidemia)     HTN (hypertension)

## 2019-10-15 NOTE — H&P ADULT - HISTORY OF PRESENT ILLNESS
68M w/ CAD s/p 3V CABG and stent (OM, RCA, and July2019 ostial LCx), DM2 on insulin, HTN, HLD presents with sudden chest pain at rest. 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 Patient reports being adherent to medications and took his asa and ticagrelor BID prior to event. He has history of MI in family in father and in grandfather. No tobacco use and only social alcohol use. He reports adequate exercise tolerance since last stent in July (per outpatient had UA) such that he can now walk up 1 flight of stairs and has not had LYONS, inability to lay flat, or awakening from sleep with sob. On ROS, patient has burning epigastric pain for which he has had for last month which has worsened after eating and was scheduled to have EGD this week by GI Dr. Katina Oropeza.    Per my discussion with EM resident physician Dr. Jameson Flood, he was concerned for ACS as patient arrived with arm clutching mid chest with reported severe angina and hx of CAD which is why he called cardiology. He also reports that he wanted to start heparin infusion and load ticagrelor however patient declined which on my interview continues to decline.    In the ED, VS 97.8, 145/76, 110, 18 100%RA  s/p morphine 4mg x1 and nitroglycerin 0.4mgx1    Patient at time of interview and exam has had chest pain resolved and now only has epigastric burning pain.

## 2019-10-15 NOTE — H&P ADULT - NSHPREVIEWOFSYSTEMS_GEN_ALL_CORE
CONSTITUTIONAL: No fever, no chills, diaphoresis+  EYES: No eye pain, no visual disturbance  Mouth: no pain in mouth, no cuts  RESPIRATORY: No cough, sob when having severe chest pain  CARDIOVASCULAR: chest pain+, no palpitations  GASTROINTESTINAL: epigastric pain+, no n/v/d  GENITOURINARY: No dysuria, no hematuria  Heme: No easy bruising, no swelling appreciated in neck/armpit/groin  NEUROLOGICAL: No headaches, no paralysis  SKIN: No itching, no rashes  MUSCULOSKELETAL: No joint pain, no joint swelling

## 2019-10-15 NOTE — H&P ADULT - NSHPLABSRESULTS_GEN_ALL_CORE
Personally reviewed available labs, imaging and ekg  Labs  CBC Full  -  ( 14 Oct 2019 22:41 )  WBC Count : 5.01 K/uL  RBC Count : 4.39 M/uL  Hemoglobin : 13.7 g/dL  Hematocrit : 40.4 %  Platelet Count - Automated : 176 K/uL  Mean Cell Volume : 92.0 fl  Mean Cell Hemoglobin : 31.2 pg  Mean Cell Hemoglobin Concentration : 33.9 gm/dL  Auto Neutrophil # : 3.33 K/uL  Auto Lymphocyte # : 1.02 K/uL  Auto Monocyte # : 0.50 K/uL  Auto Eosinophil # : 0.10 K/uL  Auto Basophil # : 0.04 K/uL  Auto Neutrophil % : 66.4 %  Auto Lymphocyte % : 20.4 %  Auto Monocyte % : 10.0 %  Auto Eosinophil % : 2.0 %  Auto Basophil % : 0.8 %  138  |  102  |  22  ----------------------------<  222<H>  3.8   |  23  |  1.13  Ca    8.7      14 Oct 2019 22:41  TPro  7.1  /  Alb  4.1  /  TBili  0.5  /  DBili  x   /  AST  25  /  ALT  47<H>  /  AlkPhos  71  10-14    Troponin T, High Sensitivity Result: 94: (10.14.19 @ 22:41)    Imaging  CXR ordered  EKG: initial EKG sinus tachycardia 115bpm with ST depressions in V4, V5,V6 and convex up ST elevation in aVR with repeat EKG documenting improving ST depressions in lateral leads and resolution of ST elevation in aVR

## 2019-10-15 NOTE — CONSULT NOTE ADULT - ATTENDING COMMENTS
Patient interviewed and examined. I was physically present for the essential portions of the E/M service provided.  Chart reviewed and note edited where appropriate.  Case discussed with fellow.  Agree w/ Assessment and Plan as outlined.    NSTEMI - now sx-free for several hours.  Spoke w/ Dr. Thomas: stented tight oCx 7/19  Proceed w/[facility]    Praneeth Goodwin MD [facility]  Spectra:  [phone]  Office: [phone]

## 2019-10-15 NOTE — CONSULT NOTE ADULT - SUBJECTIVE AND OBJECTIVE BOX
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 No nausea, vomiting at the time. Was associated with some diaphoresis. Described as epigastric burning with pressure in the chest different than his prior acid reflux. Pt. was scheduled for endoscopy tomorrow. Denies fevers, dizziness, lightheadedness. When presented to the ED chest pain was 7/10, received nitro and morphine in the ED with complete resolution of pain. Of note, pt. follow with cardiologist in [facility], had recent cath where received 1 stent andwws told coronaries were otherwise clean. No other complaints at this time    Allergies    No Known Allergies    Intolerances    	    MEDICATIONS:  amLODIPine   Tablet 5 milliGRAM(s) Oral daily  aspirin enteric coated 81 milliGRAM(s) Oral daily  heparin  Injectable 5000 Unit(s) SubCutaneous every 8 hours  lisinopril 40 milliGRAM(s) Oral daily  metoprolol tartrate 25 milliGRAM(s) Oral two times a day  ranolazine 500 milliGRAM(s) Oral two times a day  ticagrelor 90 milliGRAM(s) Oral two times a day            atorvastatin 80 milliGRAM(s) Oral at bedtime  dextrose 40% Gel 15 Gram(s) Oral once PRN  dextrose 50% Injectable 12.5 Gram(s) IV Push once  dextrose 50% Injectable 25 Gram(s) IV Push once  dextrose 50% Injectable 25 Gram(s) IV Push once  ezetimibe 10 milliGRAM(s) Oral daily  glucagon  Injectable 1 milliGRAM(s) IntraMuscular once PRN  insulin glargine Injectable (LANTUS) 25 Unit(s) SubCutaneous at bedtime  insulin lispro (HumaLOG) corrective regimen sliding scale   SubCutaneous three times a day before meals  insulin lispro (HumaLOG) corrective regimen sliding scale   SubCutaneous at bedtime    dextrose 5%. 1000 milliLiter(s) IV Continuous <Continuous>      PAST MEDICAL & SURGICAL HISTORY:  HLD (hyperlipidemia)  HTN (hypertension)  Diabetes mellitus  CAD, multiple vessel  S/P CABG x 3      FAMILY HISTORY:  FH: myocardial infarction      SOCIAL HISTORY:    [ ] Non-smoker  [ ] Smoker  [ ] Alcohol      REVIEW OF SYSTEMS:  General: no fatigue/malaise, weight loss/gain.  Skin: no rashes.  Ophthalmologic: no blurred vision, no loss of vision. 	  ENT: no sore throat, rhinorrhea, sinus congestion.  Respiratory: no SOB, cough or wheeze.  Gastrointestinal:  no N/V/D, no melena/hematemesis/hematochezia.  Genitourinary: no dysuria/hesitancy or hematuria.  Musculoskeletal: no myalgias or arthralgias.  Neurological: no changes in vision or hearing, no lightheadedness/dizziness, no syncope/near syncope	  Psychiatric: no unusual stress/anxiety.   Hematology/Lymphatics: no unusual bleeding, bruising and no lymphadenopathy.  Endocrine: no unusual thirst.   All others negative except as stated above and in HPI.    PHYSICAL EXAM:  T(C): 36.7 (10-15-19 @ 02:22), Max: 36.7 (10-15-19 @ 02:22)  HR: 95 (10-15-19 @ 02:22) (95 - 110)  BP: 144/81 (10-15-19 @ 02:22) (129/78 - 159/78)  RR: 18 (10-15-19 @ 02:22) (17 - 20)  SpO2: 96% (10-15-19 @ 02:22) (96% - 100%)  Wt(kg): --  I&O's Summary      Appearance: Normal	  HEENT:   Normal oral mucosa, PERRL, EOMI	  Lymphatic: No lymphadenopathy  Cardiovascular: Normal S1 S2, No JVD, No murmurs, No edema  Respiratory: Lungs clear to auscultation	  Psychiatry: A & O x 3, Mood & affect appropriate  Gastrointestinal:  Soft, Non-tender, + BS	  Skin: No rashes, No ecchymoses, No cyanosis	  Neurologic: Non-focal  Extremities: Normal range of motion, No clubbing, cyanosis or edema  Vascular: Peripheral pulses palpable 2+ bilaterally        LABS:	 	    CBC Full  -  ( 14 Oct 2019 22:41 )  WBC Count : 5.01 K/uL  Hemoglobin : 13.7 g/dL  Hematocrit : 40.4 %  Platelet Count - Automated : 176 K/uL  Mean Cell Volume : 92.0 fl  Mean Cell Hemoglobin : 31.2 pg  Mean Cell Hemoglobin Concentration : 33.9 gm/dL  Auto Neutrophil # : 3.33 K/uL  Auto Lymphocyte # : 1.02 K/uL  Auto Monocyte # : 0.50 K/uL  Auto Eosinophil # : 0.10 K/uL  Auto Basophil # : 0.04 K/uL  Auto Neutrophil % : 66.4 %  Auto Lymphocyte % : 20.4 %  Auto Monocyte % : 10.0 %  Auto Eosinophil % : 2.0 %  Auto Basophil % : 0.8 %    10-14    138  |  102  |  22  ----------------------------<  222<H>  3.8   |  23  |  1.13    Ca    8.7      14 Oct 2019 22:41    TPro  7.1  /  Alb  4.1  /  TBili  0.5  /  DBili  x   /  AST  25  /  ALT  47<H>  /  AlkPhos  71  10-14      proBNP:   Lipid Profile:   HgA1c:   TSH:       CARDIAC MARKERS:  CARDIAC MARKERS ( 15 Oct 2019 00:57 )  x     / x     / 203 U/L / x     / 11.0 ng/mL  High Sensitivity Troponin:x      ----------        TELEMETRY: 	    ECG:   Sinus tachycardia with ST depressions in v4-v6.   RADIOLOGY:  OTHER: 	    PREVIOUS DIAGNOSTIC TESTING:    [ ] Echocardiogram:    [ ]  Catheterization:    [ ] Stress Test:  	  	  ASSESSMENT/PLAN: 	  67 YO M with CAD presents with chest pain at rest. Now resolved after receiving nitro/aspirin    Given resolution of chest pain, unlikely closed vessel  Please sent trop, CK, CKMB  Will trend  Monitor on tele.  NPO as pt will likely need cath in AM.       Paresh Curtis  Cardiology Fellow  [phone]  All Cardiology service information can be found 24/7 on [email or web address], password: [employer]

## 2019-10-15 NOTE — H&P ADULT - ASSESSMENT
68M w/ CAD s/p 3V CABG and stent (OM, RCA, and July2019 ostial LCx), DM2 on insulin, HTN, HLD presents with sudden chest pain at rest w/ elevated troponin and angina c/w NSTEMI.

## 2019-10-15 NOTE — CHART NOTE - NSCHARTNOTEFT_GEN_A_CORE
Removal of Femoral Sheath    Pulses in the (right)  lower extremity are (palpable)  The patient was placed in the supine position. The insertion site was identified and the sutures were removed per protocol.  The ___6FA  [place] femoral sheath was then removed by NP Cristi. Direct pressure was applied for  _____20_ minutes.     Monitoring of the (right)  groin and both lower extremities including neuro-vascular checks and vital signs every 15 minutes x 4, then every 30 minutes x 2, then every 1 hour was ordered.    Complications: None/Other    Comments:

## 2019-10-16 ENCOUNTER — TRANSCRIPTION ENCOUNTER (OUTPATIENT)
Age: 69
End: 2019-10-16

## 2019-10-16 VITALS
TEMPERATURE: 98 F | DIASTOLIC BLOOD PRESSURE: 67 MMHG | SYSTOLIC BLOOD PRESSURE: 124 MMHG | HEART RATE: 72 BPM | RESPIRATION RATE: 18 BRPM | OXYGEN SATURATION: 95 %

## 2019-10-16 LAB
ANION GAP SERPL CALC-SCNC: 15 MMOL/L — SIGNIFICANT CHANGE UP (ref 5–17)
APTT BLD: 28.1 SEC — SIGNIFICANT CHANGE UP (ref 27.5–36.3)
BLD GP AB SCN SERPL QL: NEGATIVE — SIGNIFICANT CHANGE UP
BUN SERPL-MCNC: 21 MG/DL — SIGNIFICANT CHANGE UP (ref 7–23)
CALCIUM SERPL-MCNC: 9.5 MG/DL — SIGNIFICANT CHANGE UP (ref 8.4–10.5)
CHLORIDE SERPL-SCNC: 103 MMOL/L — SIGNIFICANT CHANGE UP (ref 96–108)
CK MB CFR SERPL CALC: 19.1 NG/ML — HIGH (ref 0–6.7)
CO2 SERPL-SCNC: 23 MMOL/L — SIGNIFICANT CHANGE UP (ref 22–31)
CREAT SERPL-MCNC: 1.05 MG/DL — SIGNIFICANT CHANGE UP (ref 0.5–1.3)
GLUCOSE BLDC GLUCOMTR-MCNC: 144 MG/DL — HIGH (ref 70–99)
GLUCOSE BLDC GLUCOMTR-MCNC: 185 MG/DL — HIGH (ref 70–99)
GLUCOSE SERPL-MCNC: 148 MG/DL — HIGH (ref 70–99)
HCT VFR BLD CALC: 43 % — SIGNIFICANT CHANGE UP (ref 39–50)
HCV AB S/CO SERPL IA: 0.13 S/CO — SIGNIFICANT CHANGE UP (ref 0–0.99)
HCV AB SERPL-IMP: SIGNIFICANT CHANGE UP
HGB BLD-MCNC: 14.5 G/DL — SIGNIFICANT CHANGE UP (ref 13–17)
INR BLD: 1.07 RATIO — SIGNIFICANT CHANGE UP (ref 0.88–1.16)
MCHC RBC-ENTMCNC: 31.1 PG — SIGNIFICANT CHANGE UP (ref 27–34)
MCHC RBC-ENTMCNC: 33.7 GM/DL — SIGNIFICANT CHANGE UP (ref 32–36)
MCV RBC AUTO: 92.3 FL — SIGNIFICANT CHANGE UP (ref 80–100)
NRBC # BLD: 0 /100 WBCS — SIGNIFICANT CHANGE UP (ref 0–0)
PLATELET # BLD AUTO: 176 K/UL — SIGNIFICANT CHANGE UP (ref 150–400)
POTASSIUM SERPL-MCNC: 4.4 MMOL/L — SIGNIFICANT CHANGE UP (ref 3.5–5.3)
POTASSIUM SERPL-SCNC: 4.4 MMOL/L — SIGNIFICANT CHANGE UP (ref 3.5–5.3)
PROTHROM AB SERPL-ACNC: 12.3 SEC — SIGNIFICANT CHANGE UP (ref 10–13.1)
RBC # BLD: 4.66 M/UL — SIGNIFICANT CHANGE UP (ref 4.2–5.8)
RBC # FLD: 13.2 % — SIGNIFICANT CHANGE UP (ref 10.3–14.5)
RH IG SCN BLD-IMP: POSITIVE — SIGNIFICANT CHANGE UP
SODIUM SERPL-SCNC: 141 MMOL/L — SIGNIFICANT CHANGE UP (ref 135–145)
TROPONIN T, HIGH SENSITIVITY RESULT: 599 NG/L — HIGH (ref 0–51)
WBC # BLD: 5.13 K/UL — SIGNIFICANT CHANGE UP (ref 3.8–10.5)
WBC # FLD AUTO: 5.13 K/UL — SIGNIFICANT CHANGE UP (ref 3.8–10.5)

## 2019-10-16 PROCEDURE — 83036 HEMOGLOBIN GLYCOSYLATED A1C: CPT

## 2019-10-16 PROCEDURE — 85610 PROTHROMBIN TIME: CPT

## 2019-10-16 PROCEDURE — C1769: CPT

## 2019-10-16 PROCEDURE — C1894: CPT

## 2019-10-16 PROCEDURE — 86803 HEPATITIS C AB TEST: CPT

## 2019-10-16 PROCEDURE — 80048 BASIC METABOLIC PNL TOTAL CA: CPT

## 2019-10-16 PROCEDURE — 85730 THROMBOPLASTIN TIME PARTIAL: CPT

## 2019-10-16 PROCEDURE — 82962 GLUCOSE BLOOD TEST: CPT

## 2019-10-16 PROCEDURE — 86900 BLOOD TYPING SEROLOGIC ABO: CPT

## 2019-10-16 PROCEDURE — C1887: CPT

## 2019-10-16 PROCEDURE — 71045 X-RAY EXAM CHEST 1 VIEW: CPT

## 2019-10-16 PROCEDURE — 86850 RBC ANTIBODY SCREEN: CPT

## 2019-10-16 PROCEDURE — 99239 HOSP IP/OBS DSCHRG MGMT >30: CPT

## 2019-10-16 PROCEDURE — 99152 MOD SED SAME PHYS/QHP 5/>YRS: CPT

## 2019-10-16 PROCEDURE — 93005 ELECTROCARDIOGRAM TRACING: CPT

## 2019-10-16 PROCEDURE — 82550 ASSAY OF CK (CPK): CPT

## 2019-10-16 PROCEDURE — 85027 COMPLETE CBC AUTOMATED: CPT

## 2019-10-16 PROCEDURE — 80053 COMPREHEN METABOLIC PANEL: CPT

## 2019-10-16 PROCEDURE — 92941 PRQ TRLML REVSC TOT OCCL AMI: CPT | Mod: LC

## 2019-10-16 PROCEDURE — 99285 EMERGENCY DEPT VISIT HI MDM: CPT

## 2019-10-16 PROCEDURE — 93455 CORONARY ART/GRFT ANGIO S&I: CPT | Mod: 59

## 2019-10-16 PROCEDURE — 99153 MOD SED SAME PHYS/QHP EA: CPT

## 2019-10-16 PROCEDURE — 84484 ASSAY OF TROPONIN QUANT: CPT

## 2019-10-16 PROCEDURE — 82553 CREATINE MB FRACTION: CPT

## 2019-10-16 PROCEDURE — 86901 BLOOD TYPING SEROLOGIC RH(D): CPT

## 2019-10-16 PROCEDURE — C1725: CPT

## 2019-10-16 RX ORDER — ASPIRIN/CALCIUM CARB/MAGNESIUM 324 MG
1 TABLET ORAL
Qty: 0 | Refills: 0 | DISCHARGE
Start: 2019-10-16

## 2019-10-16 RX ADMIN — Medication 25 MILLIGRAM(S): at 05:45

## 2019-10-16 RX ADMIN — LISINOPRIL 40 MILLIGRAM(S): 2.5 TABLET ORAL at 05:46

## 2019-10-16 RX ADMIN — Medication 1: at 12:06

## 2019-10-16 RX ADMIN — AMLODIPINE BESYLATE 5 MILLIGRAM(S): 2.5 TABLET ORAL at 05:46

## 2019-10-16 RX ADMIN — EZETIMIBE 10 MILLIGRAM(S): 10 TABLET ORAL at 12:06

## 2019-10-16 RX ADMIN — Medication 81 MILLIGRAM(S): at 12:06

## 2019-10-16 RX ADMIN — RANOLAZINE 500 MILLIGRAM(S): 500 TABLET, FILM COATED, EXTENDED RELEASE ORAL at 05:46

## 2019-10-16 RX ADMIN — TICAGRELOR 90 MILLIGRAM(S): 90 TABLET ORAL at 05:45

## 2019-10-16 RX ADMIN — HEPARIN SODIUM 5000 UNIT(S): 5000 INJECTION INTRAVENOUS; SUBCUTANEOUS at 05:46

## 2019-10-16 NOTE — PROGRESS NOTE ADULT - PROBLEM SELECTOR PLAN 5
c/w PPI  - has outpatient GI f/u for EGD once stable from cardiac stand point
c/w PPI  - has outpatient GI f/u for EGD once stable from cardiac stand point

## 2019-10-16 NOTE — PROGRESS NOTE ADULT - SUBJECTIVE AND OBJECTIVE BOX
Mohsin Khan, MD  Attending Physician, Division Of Hospital Medicine  Pager: [phone], Office: [phone]  Off hour pager: [phone]    Patient is a 68y old  Male who presents with a chief complaint of 68M with sudden chest pain at rest     SUBJECTIVE / OVERNIGHT EVENTS:  Sitting in chair, no c/o chest pain, SOB. Feels ok, had LH cath yesterday  Tele- SR 60-80s. Hemodynamically stable      MEDICATIONS  (STANDING):  amLODIPine   Tablet 5 milliGRAM(s) Oral daily  aspirin enteric coated 81 milliGRAM(s) Oral daily  atorvastatin 80 milliGRAM(s) Oral at bedtime  dextrose 5%. 1000 milliLiter(s) (50 mL/Hr) IV Continuous <Continuous>  dextrose 50% Injectable 12.5 Gram(s) IV Push once  dextrose 50% Injectable 25 Gram(s) IV Push once  dextrose 50% Injectable 25 Gram(s) IV Push once  ezetimibe 10 milliGRAM(s) Oral daily  heparin  Injectable 5000 Unit(s) SubCutaneous every 8 hours  insulin glargine Injectable (LANTUS) 25 Unit(s) SubCutaneous at bedtime  insulin lispro (HumaLOG) corrective regimen sliding scale   SubCutaneous three times a day before meals  insulin lispro (HumaLOG) corrective regimen sliding scale   SubCutaneous at bedtime  lisinopril 40 milliGRAM(s) Oral daily  metoprolol tartrate 25 milliGRAM(s) Oral two times a day  ranolazine 500 milliGRAM(s) Oral two times a day  ticagrelor 90 milliGRAM(s) Oral two times a day    MEDICATIONS  (PRN):  dextrose 40% Gel 15 Gram(s) Oral once PRN Blood Glucose LESS THAN 70 milliGRAM(s)/deciliter  glucagon  Injectable 1 milliGRAM(s) IntraMuscular once PRN Glucose LESS THAN 70 milligrams/deciliter      Vital Signs Last 24 Hrs  T(C): 36.3 (16 Oct 2019 08:05), Max: 36.9 (15 Oct 2019 11:25)  T(F): 97.4 (16 Oct 2019 08:05), Max: 98.5 (15 Oct 2019 11:25)  HR: 72 (16 Oct 2019 08:05) (72 - 82)  BP: 110/67 (16 Oct 2019 08:05) (110/67 - 156/82)  BP(mean): --  RR: 18 (16 Oct 2019 08:05) (18 - 18)  SpO2: 96% (16 Oct 2019 08:05) (93% - 97%)  CAPILLARY BLOOD GLUCOSE      POCT Blood Glucose.: 144 mg/dL (16 Oct 2019 07:36)  POCT Blood Glucose.: 146 mg/dL (15 Oct 2019 21:05)  POCT Blood Glucose.: 105 mg/dL (15 Oct 2019 19:07)  POCT Blood Glucose.: 118 mg/dL (15 Oct 2019 15:58)  POCT Blood Glucose.: 145 mg/dL (15 Oct 2019 11:57)    I&O's Summary    15 Oct 2019 07:01  -  16 Oct 2019 07:00  --------------------------------------------------------  IN: 565 mL / OUT: 500 mL / NET: 65 mL    16 Oct 2019 07:01  -  16 Oct 2019 10:38  --------------------------------------------------------  IN: 220 mL / OUT: 0 mL / NET: 220 mL        PHYSICAL EXAM:-  GENERAL: NAD, well-developed  EYES: EOMI, PERRLA, conjunctiva and sclera clear  NECK: Supple, No JVD, no thyromegaly  CHEST/LUNG: Clear to auscultation bilaterally; No wheeze  HEART: Regular rate and rhythm; S1, S2 audible, No murmurs, rubs, or gallops  ABDOMEN: Soft, Nontender, Nondistended; Bowel sounds present  EXTREMITIES:  2+ Peripheral Pulses, No clubbing, cyanosis, or edema  NEURO: AAOx3, no focal deficit      LABS:                        14.5   5.13  )-----------( 176      ( 16 Oct 2019 06:39 )             43.0     10-16    141  |  103  |  21  ----------------------------<  148<H>  4.4   |  23  |  1.05    Ca    9.5      16 Oct 2019 06:39    TPro  7.1  /  Alb  4.1  /  TBili  0.5  /  DBili  x   /  AST  25  /  ALT  47<H>  /  AlkPhos  71  10-14    PT/INR - ( 16 Oct 2019 09:58 )   PT: 12.3 sec;   INR: 1.07 ratio         PTT - ( 16 Oct 2019 09:58 )  PTT:28.1 sec  CARDIAC MARKERS ( 16 Oct 2019 06:39 )  x     / x     / x     / x     / 19.1 ng/mL  CARDIAC MARKERS ( 15 Oct 2019 10:29 )  x     / x     / 458 U/L / x     / 47.8 ng/mL  CARDIAC MARKERS ( 15 Oct 2019 04:58 )  x     / x     / 340 U/L / x     / 36.0 ng/mL  CARDIAC MARKERS ( 15 Oct 2019 00:57 )  x     / x     / 203 U/L / x     / 11.0 ng/mL        RADIOLOGY & ADDITIONAL TESTS:    Imaging Personally Reviewed: CXR, Cath  Consultant(s) Notes Reviewed:  Cardiology  Care Discussed with Consultants/Other Providers: Cardiology
Overnight Events: MARANDA, this AM, pt feels well.    Review Of Systems: No chest pain, shortness of breath, or palpitations            Current Meds:  amLODIPine   Tablet 5 milliGRAM(s) Oral daily  aspirin enteric coated 81 milliGRAM(s) Oral daily  atorvastatin 80 milliGRAM(s) Oral at bedtime  dextrose 40% Gel 15 Gram(s) Oral once PRN  dextrose 5%. 1000 milliLiter(s) IV Continuous <Continuous>  dextrose 50% Injectable 12.5 Gram(s) IV Push once  dextrose 50% Injectable 25 Gram(s) IV Push once  dextrose 50% Injectable 25 Gram(s) IV Push once  ezetimibe 10 milliGRAM(s) Oral daily  glucagon  Injectable 1 milliGRAM(s) IntraMuscular once PRN  heparin  Injectable 5000 Unit(s) SubCutaneous every 8 hours  insulin glargine Injectable (LANTUS) 25 Unit(s) SubCutaneous at bedtime  insulin lispro (HumaLOG) corrective regimen sliding scale   SubCutaneous three times a day before meals  insulin lispro (HumaLOG) corrective regimen sliding scale   SubCutaneous at bedtime  lisinopril 40 milliGRAM(s) Oral daily  metoprolol tartrate 25 milliGRAM(s) Oral two times a day  ranolazine 500 milliGRAM(s) Oral two times a day  ticagrelor 90 milliGRAM(s) Oral two times a day      Vitals:  T(F): 97.4 (10-16), Max: 98.5 (10-15)  HR: 72 (10-16) (72 - 82)  BP: 110/67 (10-16) (110/67 - 156/82)  RR: 18 (10-16)  SpO2: 96% (10-16)  I&O's Summary    15 Oct 2019 07:01  -  16 Oct 2019 07:00  --------------------------------------------------------  IN: 565 mL / OUT: 500 mL / NET: 65 mL    16 Oct 2019 07:01  -  16 Oct 2019 09:06  --------------------------------------------------------  IN: 220 mL / OUT: 0 mL / NET: 220 mL    Physical Exam:  Appearance: No acute distress; well appearing  Cardiovascular: RRR, S1, S2, no murmurs, rubs, or gallops; no edema; no JVD  Respiratory: Clear to auscultation bilaterally  Ext: WWP, no edema. R femoral site c/d/i without hematoma. 2+ DP pulses                          14.5   5.13  )-----------( 176      ( 16 Oct 2019 06:39 )             43.0     10-16    141  |  103  |  21  ----------------------------<  148<H>  4.4   |  23  |  1.05    Ca    9.5      16 Oct 2019 06:39    TPro  7.1  /  Alb  4.1  /  TBili  0.5  /  DBili  x   /  AST  25  /  ALT  47<H>  /  AlkPhos  71  10-14    CARDIAC MARKERS ( 16 Oct 2019 06:39 )  599 ng/L / x     / x     / x     / x     / 19.1 ng/mL  CARDIAC MARKERS ( 15 Oct 2019 10:29 )  666 ng/L / x     / x     / 458 U/L / x     / 47.8 ng/mL  CARDIAC MARKERS ( 15 Oct 2019 04:58 )  425 ng/L / x     / x     / 340 U/L / x     / 36.0 ng/mL  CARDIAC MARKERS ( 15 Oct 2019 00:57 )  178 ng/L / x     / x     / 203 U/L / x     / 11.0 ng/mL  CARDIAC MARKERS ( 14 Oct 2019 22:41 )  94 ng/L / x     / x     / x     / x     / x        New ECG(s): Personally reviewed  Cath:CORONARY VESSELS: The coronary circulation is right dominant.  LM:   --  Proximal left main: There was a 20 % stenosis.  LAD:   --  Ostial LAD: There was a 100 % stenosis.  CX:   --  OM1: There was a diffuse 80 % stenosis at the site of a prior  stent. This is a likely culprit for the patient's clinical presentation.  RCA:   --  Proximal RCA: There was a 70 % stenosis.  --  Mid RCA: There was a 100 % stenosis. There was good collateral blood  supply to the distal myocardium from LAD beyond LIMA.  GRAFTS:   --  Graft to the LAD: The graft was a LIMA. Graft angiography  showed no evidence of disease.  --  Graft to the 1st diagonal: There was a 100 % stenosis at the graft  ostium.  COMPLICATIONS: There were no complications.  DIAGNOSTIC RECOMMENDATIONS: DAPT for one year insetting of NSTEMI.  Successful POBA to the OM1 (restenosis) which is likely the culprit for the  clinical presentation.  Recommend brachytherapy in 4-5 weeks.    Interpretation of Telemetry: NSR in 60s-80s
Mohsin Khan, MD  Attending Physician, Division Of Hospital Medicine  Pager: [phone], Office: [phone]  Off hour pager: [phone]    Patient is a 68y old  Male who presents with a chief complaint of 68M with sudden chest pain at rest     SUBJECTIVE / OVERNIGHT EVENTS:  Resting in bed, no c/o chest pain, SOB. Yesterday in ED and in EMS he had chest pain 8/10, relieved by NTG  Tele- SR 70-90s, /81      MEDICATIONS  (STANDING):  amLODIPine   Tablet 5 milliGRAM(s) Oral daily  aspirin enteric coated 81 milliGRAM(s) Oral daily  atorvastatin 80 milliGRAM(s) Oral at bedtime  dextrose 5%. 1000 milliLiter(s) (50 mL/Hr) IV Continuous <Continuous>  dextrose 50% Injectable 12.5 Gram(s) IV Push once  dextrose 50% Injectable 25 Gram(s) IV Push once  dextrose 50% Injectable 25 Gram(s) IV Push once  ezetimibe 10 milliGRAM(s) Oral daily  heparin  Injectable 5000 Unit(s) SubCutaneous every 8 hours  insulin glargine Injectable (LANTUS) 25 Unit(s) SubCutaneous at bedtime  insulin lispro (HumaLOG) corrective regimen sliding scale   SubCutaneous three times a day before meals  insulin lispro (HumaLOG) corrective regimen sliding scale   SubCutaneous at bedtime  lisinopril 40 milliGRAM(s) Oral daily  metoprolol tartrate 25 milliGRAM(s) Oral two times a day  ranolazine 500 milliGRAM(s) Oral two times a day  ticagrelor 90 milliGRAM(s) Oral two times a day    MEDICATIONS  (PRN):  dextrose 40% Gel 15 Gram(s) Oral once PRN Blood Glucose LESS THAN 70 milliGRAM(s)/deciliter  glucagon  Injectable 1 milliGRAM(s) IntraMuscular once PRN Glucose LESS THAN 70 milligrams/deciliter      Vital Signs Last 24 Hrs  T(C): 36.7 (15 Oct 2019 02:22), Max: 36.7 (15 Oct 2019 02:22)  T(F): 98 (15 Oct 2019 02:22), Max: 98 (15 Oct 2019 02:22)  HR: 95 (15 Oct 2019 02:22) (95 - 110)  BP: 144/81 (15 Oct 2019 02:22) (129/78 - 159/78)  BP(mean): --  RR: 18 (15 Oct 2019 02:22) (17 - 20)  SpO2: 96% (15 Oct 2019 02:22) (96% - 100%)  CAPILLARY BLOOD GLUCOSE      POCT Blood Glucose.: 122 mg/dL (15 Oct 2019 07:45)    I&O's Summary    15 Oct 2019 07:01  -  15 Oct 2019 10:58  --------------------------------------------------------  IN: 0 mL / OUT: 500 mL / NET: -500 mL        PHYSICAL EXAM:-  GENERAL: NAD, well-developed  EYES: EOMI, PERRLA, conjunctiva and sclera clear  NECK: Supple, No JVD, no thyromegaly  CHEST/LUNG: Clear to auscultation bilaterally; No wheeze  HEART: Regular rate and rhythm; S1, S2 audible, No murmurs, rubs, or gallops  ABDOMEN: Soft, Nontender, Nondistended; Bowel sounds present  EXTREMITIES:  2+ Peripheral Pulses, No clubbing, cyanosis, or edema  NEURO: AAOx3, no focal deficit      LABS:                        13.7   5.02  )-----------( 173      ( 15 Oct 2019 08:36 )             41.1     10-14    138  |  102  |  22  ----------------------------<  222<H>  3.8   |  23  |  1.13    Ca    8.7      14 Oct 2019 22:41    TPro  7.1  /  Alb  4.1  /  TBili  0.5  /  DBili  x   /  AST  25  /  ALT  47<H>  /  AlkPhos  71  10-14      CARDIAC MARKERS ( 15 Oct 2019 04:58 )  x     / x     / 340 U/L / x     / 36.0 ng/mL  CARDIAC MARKERS ( 15 Oct 2019 00:57 )  x     / x     / 203 U/L / x     / 11.0 ng/mL          RADIOLOGY & ADDITIONAL TESTS:    Imaging Personally Reviewed: CXR  Consultant(s) Notes Reviewed:  Cardiology  Care Discussed with Consultants/Other Providers: Cardiology

## 2019-10-16 NOTE — DISCHARGE NOTE NURSING/CASE MANAGEMENT/SOCIAL WORK - PATIENT PORTAL LINK FT
You can access the FollowMyHealth Patient Portal offered by [facility] by registering at the following website: [email or web address]. By joining [facility]’s FollowMyHealth portal, you will also be able to view your health information using other applications (apps) compatible with our system.

## 2019-10-16 NOTE — PROGRESS NOTE ADULT - PROBLEM SELECTOR PLAN 3
BP has been stable.  - c/w amlodipine, metoprolol and ACE at home doses
BP has been stable.  - c/w amlodipine, metoprolol and ACE at home doses

## 2019-10-16 NOTE — PROGRESS NOTE ADULT - PROBLEM SELECTOR PLAN 1
Feels ok, no chest pain this morning. On admission EKG showed ST depression in lateral leads. Trop 94->178->425. He had h/o CAD, s/p stents in last 3 years  - s/p LH cath, POBA to OM1 for 80% restenosis  - On DAPT, statin. Metoprolol, Lisinopril at home dose.   - Cardiology plan noted, will have Brachytherapy in 4-6 weeks with Dr Resendiz  - d/c planning today if cardiology clears
No chest pain this morning and since admission. EKG showed ST depression in lateral leads. Trop 94->178->425. He had h/o CAD, s/p stents in last 3 years  - On DAPT, statin. Metoprolol, Lisinopril at home dose.     Spoke to cards fellow. plans for LH cath today    patient refused IV heparin gtt and wanted cardiology team to speak to Dr Steven Thomas

## 2019-10-16 NOTE — DISCHARGE NOTE PROVIDER - HOSPITAL COURSE
68M w/ CAD s/p 3V CABG and stent (OM, RCA, and July2019 ostial LCx), DM2 on insulin, HTN, HLD presents with sudden chest pain at rest w/ elevated troponin and angina c/w NSTEMI.    NSTEMI (non-ST elevated myocardial infarction).  On admission EKG showed ST depression in lateral leads. Trop 94->178->425. He had h/o CAD, s/p stents in last 3 years    - s/p LH cath, POBA to OM1 for 80% restenosis, on DAPT, statin. Metoprolol, Lisinopril at home dose. Cardiology plan noted, will have Brachytherapy in 4-6 weeks with Dr Brown    Type 2 diabetes mellitus without complication, with long-term current use of insulin.  Plan: HbA1C 6.7%,  c/w Insulin Lantus 25U w/ HSS, Essential hypertension.  Plan: BP has been stable.    - c/w amlodipine, metoprolol and ACE at home doses. Hyperlipidemia, unspecified hyperlipidemia type.  Plan: - c/w atorvastatin and ezetimibe.     D/C to home on home med regimen.  follow up with Dr. Thomas in 1 week and Dr. Brown in 4 weeks for brachytherapy. 68M w/ CAD s/p 3V CABG and stent (OM, RCA, and July2019 ostial LCx), DM2 on insulin, HTN, HLD presents with sudden chest pain at rest w/ elevated troponin and angina c/w NSTEMI.        1. NSTEMI (non-ST elevated myocardial infarction).  On admission EKG showed ST depression in lateral leads. Trop 94->178->425. He had h/o CAD, s/p stents in last 3 years    - s/p LH cath, POBA to OM1 for 80% restenosis, on DAPT, statin. Metoprolol, Lisinopril at home doses. He will have Brachytherapy in 4-6 weeks with Dr Brown        2. Type 2 diabetes mellitus without complication, with long-term current use of insulin:  HbA1C 6.7%,  He would continue his home regimens of medicines         3. Essential hypertension: BP has been stable.    - c/w amlodipine, metoprolol and ACE at home doses.         4. Hyperlipidemia, unspecified hyperlipidemia type.  - c/w atorvastatin and ezetimibe.         Cardiology cleared as he remained hemodynamically stable. D/C home with home med regimen.  He will follow up with Cardiology- Dr. Steven Thomas in 1 week and Dr. Brown in 4 weeks for brachytherapy.

## 2019-10-16 NOTE — PROGRESS NOTE ADULT - REASON FOR ADMISSION
68M with sudden chest pain at rest
68M with sudden chest pain at rest
Chest pain, possibly ACS
Chest pain

## 2019-10-16 NOTE — PROGRESS NOTE ADULT - ATTENDING COMMENTS
Patient interviewed and examined. I was physically present for the essential portions of the E/M service provided.  Chart reviewed and note edited where appropriate.  Case discussed with fellow.  Agree w/ Assessment and Plan as outlined.    Praneeth Goodwin MD [facility]  Spectra:  [phone]  Office: [phone]
Spoke to wife at bedside
d/c time- 42 min

## 2019-10-16 NOTE — DISCHARGE NOTE PROVIDER - NSDCCPCAREPLAN_GEN_ALL_CORE_FT
PRINCIPAL DISCHARGE DIAGNOSIS  Diagnosis: Chest pain  Assessment and Plan of Treatment:   HOME CARE INSTRUCTIONS  For the next few days, avoid physical activities that bring on chest pain. Continue physical activities as directed.  Do not Informed pt of the various negative side effects of smoking including risk of COPD, Lung Ca etc  Strongly recommended that pt stops smoking and pt given various options of smoking cessasion tools such as NRT's and other pharmacotherapies.  Avoid drinking alcohol.   Only take over-the-counter or prescription medicine for pain, discomfort, or fever as directed by your caregiver.  Follow your caregiver's suggestions for further testing if your chest pain does not go away.  Keep any follow-up appointments you made. If you do not go to an appointment, you could develop lasting (chronic) problems with pain. If there is any problem keeping an appointment, you must call to reschedule.   SEEK MEDICAL CARE IF:  You think you are having problems from the medicine you are taking. Read your medicine instructions carefully.  Your chest pain does not go away, even after treatment.  You develop a rash with blisters on your chest.  SEEK IMMEDIATE MEDICAL CARE IF:  You have increased chest pain or pain that spreads to your arm, neck, jaw, back, or abdomen.   You develop shortness of breath, an increasing cough, or you are coughing up blood.  You have severe back or abdominal pain, feel nauseous, or vomit.  You develop severe weakness, fainting, or chills.  You have a fever.  THIS IS AN EMERGENCY. Do not wait to see if the pain will go away. Get medical help at once. Call your local emergency services (_____________________). Do not drive yourself to the hospital.      SECONDARY DISCHARGE DIAGNOSES  Diagnosis: Non-ST elevation MI (NSTEMI)  Assessment and Plan of Treatment: Call your doctor if you have unusual chest pain, pressure, or discomfort, shortness of breath, nausea, vomiting, burping, heartburn, tingling upper body parts, sweating, cold, clammy sking, racing heartbeat  Call 911 if you think you are having a heart attack  Take all cardiac medications as prescribed - notify your doctor if you have any side effects  Follow cardiac diet - avoid fatty & fried foods, don't eat too much red meat, eat lots of fruits & vegetables, dairy products should be low fat  Lose weight if you are overweight  Become more active with walking, gardening, or any other activity that gets you to move    Diagnosis: Essential hypertension  Assessment and Plan of Treatment: Low salt diet  Activity as tolerated.  Take all medication as prescribed.  Follow up with your medical doctor for routine blood pressure monitoring at your next visit.  Notify your doctor if you have any of the following symptoms:   Dizziness, Lightheadedness, Blurry vision, Headache, Chest pain, Shortness of breath    Diagnosis: DM (diabetes mellitus)  Assessment and Plan of Treatment: HgA1C this admission.  Make sure you get your HgA1c checked every three months.  If you take oral diabetes medications, check your blood glucose two times a day.  If you take insulin, check your blood glucose before meals and at bedtime.  It's important not to skip any meals.  Keep a log of your blood glucose results and always take it with you to your doctor appointments.  Keep a list of your current medications including injectables and over the counter medications and bring this medication list with you to all your doctor appointments.  If you have not seen your ophthalmologist this year call for appointment.  Check your feet daily for redness, sores, or openings. Do not self treat. If no improvement in two days call your primary care physician for an appointment.  Low blood sugar (hypoglycemia) is a blood sugar below 70mg/dl. Check your blood sugar if you feel signs/symptoms of hypoglycemia. If your blood sugar is below 70 take 15 grams of carbohydrates (ex 4 oz of apple juice, 3-4 glucose tablets, or 4-6 oz of regular soda) wait 15 minutes and repeat blood sugar to make sure it comes up above 70.  If your blood sugar is above 70 and you are due for a meal, have a meal.  If you are not due for a meal have a snack.  This snack helps keeps your blood sugar at a safe range.

## 2019-10-16 NOTE — PROGRESS NOTE ADULT - PROBLEM SELECTOR PLAN 2
HbA1C 6.7%  - c/w Insulin Lantus 25U w/ HSS  - On d/c will do home regimens of his meds
HbA1C 6.7%  - c/w Insulin Lantus 25U w/ HSS

## 2019-10-16 NOTE — PROGRESS NOTE ADULT - ASSESSMENT
For all Cardiology service contact information, go to [email or web address] and use "[email or web address]" to login.    SUBJECTIVE:   Denies CP, SOB or Dyspnea.   Telemetry reviewed.   -------------------------------------------------------------------------------------------  ROS:  CV: chest pain (-), palpitation (-) PULM: SOB (-), cough (-), wheezing (-), hemoptysis (-). CONST: fever (-), chills (-) or fatigue (-). HEENT:Visual changes (-); vertigo or throat pain (-);  neck stiffness (-). GI: abdominal pain (-) , nausea/vomiting (-), hematemesis, (-)melena (-), hematochezia (-). : dysuria (-), frequency (-), hematuria (-). NEURO: numbness (-), weakness (-). SKIN: itching (-), rash (-)    All other review of systems is negative unless indicated above.   -------------------------------------------------------------------------------------------  VS:  T(F): 98 (10-16), Max: 98.4 (10-15)  HR: 71 (10-16) (71 - 82)  BP: 116/68 (10-16) (110/67 - 156/82)  RR: 18 (10-16)  SpO2: 95% (10-16)  I&O's Summary    15 Oct 2019 07:01  -  16 Oct 2019 07:00  --------------------------------------------------------  IN: 565 mL / OUT: 500 mL / NET: 65 mL    16 Oct 2019 07:01  -  16 Oct 2019 12:56  --------------------------------------------------------  IN: 220 mL / OUT: 0 mL / NET: 220 mL      -------------------------------------------------------------------------------------------  EXAM:   GEN: No acute distress; well appearing.  CV:  PULM:  HEENT: PERRL, EOMI, No scleral icterus. Normal mucosa.  GI: soft, non-tender, non-distended.   MSK: No joint deformity.  NEURO: Non-focal.  LYMPH: No lymphadenopathy.  PSY: Mood & affect appropriate.  SKIN: No rashes, ecchymoses, or cyanosis.  -------------------------------------------------------------------------------------------  LABS:                        14.5   5.13  )-----------( 176      ( 16 Oct 2019 06:39 )             43.0       10-16    141  |  103  |  21  ----------------------------<  148<H>  4.4   |  23  |  1.05    Ca    9.5      16 Oct 2019 06:39    TPro  7.1  /  Alb  4.1  /  TBili  0.5  /  DBili  x   /  AST  25  /  ALT  47<H>  /  AlkPhos  71  10-14     PT/INR - ( 16 Oct 2019 09:58 )   PT: 12.3 sec;   INR: 1.07 ratio         PTT - ( 16 Oct 2019 09:58 )  PTT:28.1 sec   CARDIAC MARKERS ( 16 Oct 2019 06:39 )  x     / x     / x     / x     / 19.1 ng/mL  CARDIAC MARKERS ( 15 Oct 2019 10:29 )  x     / x     / 458 U/L / x     / 47.8 ng/mL  CARDIAC MARKERS ( 15 Oct 2019 04:58 )  x     / x     / 340 U/L / x     / 36.0 ng/mL  CARDIAC MARKERS ( 15 Oct 2019 00:57 )  x     / x     / 203 U/L / x     / 11.0 ng/mL              -------------------------------------------------------------------------------------------  Meds:  amLODIPine   Tablet 5 milliGRAM(s) Oral daily  aspirin enteric coated 81 milliGRAM(s) Oral daily  atorvastatin 80 milliGRAM(s) Oral at bedtime  dextrose 40% Gel 15 Gram(s) Oral once PRN  dextrose 5%. 1000 milliLiter(s) IV Continuous <Continuous>  dextrose 50% Injectable 12.5 Gram(s) IV Push once  dextrose 50% Injectable 25 Gram(s) IV Push once  dextrose 50% Injectable 25 Gram(s) IV Push once  ezetimibe 10 milliGRAM(s) Oral daily  glucagon  Injectable 1 milliGRAM(s) IntraMuscular once PRN  heparin  Injectable 5000 Unit(s) SubCutaneous every 8 hours  insulin glargine Injectable (LANTUS) 25 Unit(s) SubCutaneous at bedtime  insulin lispro (HumaLOG) corrective regimen sliding scale   SubCutaneous three times a day before meals  insulin lispro (HumaLOG) corrective regimen sliding scale   SubCutaneous at bedtime  lisinopril 40 milliGRAM(s) Oral daily  metoprolol tartrate 25 milliGRAM(s) Oral two times a day  ranolazine 500 milliGRAM(s) Oral two times a day  ticagrelor 90 milliGRAM(s) Oral two times a day    -------------------------------------------------------------------------------------------  Telemetry reviewed. New ECG in chart reviewed. New Echocardiogram in chart reviewed.  New Stress Testing in chart reviewed. New Cardiac Catheterization in chart chart reviewed. New imaging reviewed.     -------------------------------------------------------------------------------------------  ASSESSMENT AND PLAN:     68 M with CAD s/p CABG, HTN/ DM/ HLD who presented with CP with diaphoresis, found to have NSTEMI.   Pt is now s/p C 10/15/19 with POBA to OM1 restenosis 80% in prior stent.   - continue DAPT for 1 year  - R groin femoral site without hematoma. Has good distal pulses  - f/u with Dr. Thomas on discharge  - plans for brachytherapy in 4-5 weeks with Dr. Resendiz  - rest of plan per primary cardiology consult team    Santos Lazar  Cardiology Fellow   [phone]     For all Cardiology service contact information, go to [email or web address] and use "[email or web address]" to login. For all Cardiology service contact information, go to [email or web address] and use "[email or web address]" to login.    SUBJECTIVE:   Denies CP, SOB or Dyspnea.   Telemetry reviewed.   -------------------------------------------------------------------------------------------  ROS:  CV: chest pain (-), palpitation (-) PULM: SOB (-), cough (-), wheezing (-), hemoptysis (-). CONST: fever (-), chills (-) or fatigue (-). HEENT:Visual changes (-); vertigo or throat pain (-);  neck stiffness (-). GI: abdominal pain (-) , nausea/vomiting (-), hematemesis, (-)melena (-), hematochezia (-). : dysuria (-), frequency (-), hematuria (-). NEURO: numbness (-), weakness (-). SKIN: itching (-), rash (-)    All other review of systems is negative unless indicated above.   -------------------------------------------------------------------------------------------  VS:  T(F): 98 (10-16), Max: 98.4 (10-15)  HR: 71 (10-16) (71 - 82)  BP: 116/68 (10-16) (110/67 - 156/82)  RR: 18 (10-16)  SpO2: 95% (10-16)  I&O's Summary    15 Oct 2019 07:01  -  16 Oct 2019 07:00  --------------------------------------------------------  IN: 565 mL / OUT: 500 mL / NET: 65 mL    16 Oct 2019 07:01  -  16 Oct 2019 12:56  --------------------------------------------------------  IN: 220 mL / OUT: 0 mL / NET: 220 mL      -------------------------------------------------------------------------------------------  EXAM:   GEN: No acute distress; well appearing.  CV:  PULM:  HEENT: PERRL, EOMI, No scleral icterus. Normal mucosa.  GI: soft, non-tender, non-distended.   MSK: No joint deformity.  NEURO: Non-focal.  LYMPH: No lymphadenopathy.  PSY: Mood & affect appropriate.  SKIN: No rashes, ecchymoses, or cyanosis.  -------------------------------------------------------------------------------------------  LABS:                        14.5   5.13  )-----------( 176      ( 16 Oct 2019 06:39 )             43.0       10-16    141  |  103  |  21  ----------------------------<  148<H>  4.4   |  23  |  1.05    Ca    9.5      16 Oct 2019 06:39    TPro  7.1  /  Alb  4.1  /  TBili  0.5  /  DBili  x   /  AST  25  /  ALT  47<H>  /  AlkPhos  71  10-14     PT/INR - ( 16 Oct 2019 09:58 )   PT: 12.3 sec;   INR: 1.07 ratio         PTT - ( 16 Oct 2019 09:58 )  PTT:28.1 sec   CARDIAC MARKERS ( 16 Oct 2019 06:39 )  x     / x     / x     / x     / 19.1 ng/mL  CARDIAC MARKERS ( 15 Oct 2019 10:29 )  x     / x     / 458 U/L / x     / 47.8 ng/mL  CARDIAC MARKERS ( 15 Oct 2019 04:58 )  x     / x     / 340 U/L / x     / 36.0 ng/mL  CARDIAC MARKERS ( 15 Oct 2019 00:57 )  x     / x     / 203 U/L / x     / 11.0 ng/mL              -------------------------------------------------------------------------------------------  Meds:  amLODIPine   Tablet 5 milliGRAM(s) Oral daily  aspirin enteric coated 81 milliGRAM(s) Oral daily  atorvastatin 80 milliGRAM(s) Oral at bedtime  dextrose 40% Gel 15 Gram(s) Oral once PRN  dextrose 5%. 1000 milliLiter(s) IV Continuous <Continuous>  dextrose 50% Injectable 12.5 Gram(s) IV Push once  dextrose 50% Injectable 25 Gram(s) IV Push once  dextrose 50% Injectable 25 Gram(s) IV Push once  ezetimibe 10 milliGRAM(s) Oral daily  glucagon  Injectable 1 milliGRAM(s) IntraMuscular once PRN  heparin  Injectable 5000 Unit(s) SubCutaneous every 8 hours  insulin glargine Injectable (LANTUS) 25 Unit(s) SubCutaneous at bedtime  insulin lispro (HumaLOG) corrective regimen sliding scale   SubCutaneous three times a day before meals  insulin lispro (HumaLOG) corrective regimen sliding scale   SubCutaneous at bedtime  lisinopril 40 milliGRAM(s) Oral daily  metoprolol tartrate 25 milliGRAM(s) Oral two times a day  ranolazine 500 milliGRAM(s) Oral two times a day  ticagrelor 90 milliGRAM(s) Oral two times a day    -------------------------------------------------------------------------------------------  Telemetry reviewed. New ECG in chart reviewed. New Echocardiogram in chart reviewed.  New Stress Testing in chart reviewed. New Cardiac Catheterization in chart chart reviewed. New imaging reviewed.     -------------------------------------------------------------------------------------------  ASSESSMENT AND PLAN:     68 M with CAD s/p CABG, HTN/ DM/ HLD who presented with CP with diaphoresis, found to have NSTEMI.   Pt is now s/p C 10/15/19 with POBA to OM1 restenosis 80% in prior stent.   - continue aspirin, brilinta, metoprolol 25 bid, ranolazine 500 mg bid  - R groin femoral site without hematoma. Has good distal pulses  - f/u with Dr. Thomas on discharge  - plans for brachytherapy in 4-5 weeks with Dr. Martín Lazar  Cardiology Fellow   [phone]     For all Cardiology service contact information, go to [email or web address] and use "[email or web address]" to login.

## 2019-10-16 NOTE — PROGRESS NOTE ADULT - ASSESSMENT
Assessment and Recommendations:  67 y/o M with CAD s/p CABG, HTN/DM/HLD who presented with CP with diaphoresis, found to have NSTEMI. Pt is now s/p C 10/15/19 with POBA to OM1 restenosis 80% in prior stent.   -continue DAPT for 1 year  -R groin femoral site without hematoma. Has good distal pulses  -f/u with Dr. Thomas on discharge  -plans for brachytherapy in 4-5 weeks with Dr. Resendiz  -rest of plan per primary cardiology consult team    John Martínez MD  Cardiology Fellow  All Cardiology service information can be found 24/7 on [email or web address], password: [email or web address]    Patient seen and examined at bedside.

## 2019-10-16 NOTE — DISCHARGE NOTE PROVIDER - CARE PROVIDER_API CALL
Ed Resendiz ()  Cardiology; Internal Medicine  [address] [place] [address]  Phone: [phone]  Fax: [phone]  Follow Up Time:

## 2019-10-17 PROBLEM — I10 ESSENTIAL (PRIMARY) HYPERTENSION: Chronic | Status: ACTIVE | Noted: 2019-10-15

## 2019-10-17 PROBLEM — E78.5 HYPERLIPIDEMIA, UNSPECIFIED: Chronic | Status: ACTIVE | Noted: 2019-10-15

## 2019-10-17 PROBLEM — E11.9 TYPE 2 DIABETES MELLITUS WITHOUT COMPLICATIONS: Chronic | Status: ACTIVE | Noted: 2019-10-15

## 2019-10-17 PROBLEM — I25.10 ATHEROSCLEROTIC HEART DISEASE OF NATIVE CORONARY ARTERY WITHOUT ANGINA PECTORIS: Chronic | Status: ACTIVE | Noted: 2019-10-15

## 2019-10-24 ENCOUNTER — NON-APPOINTMENT (OUTPATIENT)
Age: 69
End: 2019-10-24

## 2019-10-24 ENCOUNTER — APPOINTMENT (OUTPATIENT)
Dept: CARDIOLOGY | Facility: CLINIC | Age: 69
End: 2019-10-24
Payer: MEDICARE

## 2019-10-24 VITALS
HEART RATE: 88 BPM | OXYGEN SATURATION: 98 % | SYSTOLIC BLOOD PRESSURE: 102 MMHG | BODY MASS INDEX: 29.4 KG/M2 | HEIGHT: 68 IN | WEIGHT: 194 LBS | DIASTOLIC BLOOD PRESSURE: 50 MMHG

## 2019-10-24 PROCEDURE — 99215 OFFICE O/P EST HI 40 MIN: CPT

## 2019-10-24 RX ORDER — GLIPIZIDE 10 MG/1
10 TABLET ORAL DAILY
Refills: 0 | Status: DISCONTINUED | COMMUNITY
End: 2019-10-24

## 2019-10-24 NOTE — PHYSICAL EXAM
[General Appearance - Well Developed] : well developed [Normal Appearance] : normal appearance [Well Groomed] : well groomed [General Appearance - Well Nourished] : well nourished [No Deformities] : no deformities [General Appearance - In No Acute Distress] : no acute distress [Normal Conjunctiva] : the conjunctiva exhibited no abnormalities [Eyelids - No Xanthelasma] : the eyelids demonstrated no xanthelasmas [Normal Oral Mucosa] : normal oral mucosa [No Oral Pallor] : no oral pallor [No Oral Cyanosis] : no oral cyanosis [Normal Jugular Venous A Waves Present] : normal jugular venous A waves present [Normal Jugular Venous V Waves Present] : normal jugular venous V waves present [No Jugular Venous Lopez A Waves] : no jugular venous lpoez A waves [Respiration, Rhythm And Depth] : normal respiratory rhythm and effort [Exaggerated Use Of Accessory Muscles For Inspiration] : no accessory muscle use [Auscultation Breath Sounds / Voice Sounds] : lungs were clear to auscultation bilaterally [Heart Rate And Rhythm] : heart rate and rhythm were normal [Heart Sounds] : normal S1 and S2 [Murmurs] : no murmurs present [Abdomen Soft] : soft [Abdomen Tenderness] : non-tender [Abdomen Mass (___ Cm)] : no abdominal mass palpated [Abnormal Walk] : normal gait [Gait - Sufficient For Exercise Testing] : the gait was sufficient for exercise testing [Nail Clubbing] : no clubbing of the fingernails [Cyanosis, Localized] : no localized cyanosis [Petechial Hemorrhages (___cm)] : no petechial hemorrhages [Skin Color & Pigmentation] : normal skin color and pigmentation [] : no rash [No Venous Stasis] : no venous stasis [Skin Lesions] : no skin lesions [No Skin Ulcers] : no skin ulcer [No Xanthoma] : no  xanthoma was observed [Oriented To Time, Place, And Person] : oriented to person, place, and time [Affect] : the affect was normal [Mood] : the mood was normal [No Anxiety] : not feeling anxious

## 2019-10-24 NOTE — REASON FOR VISIT
[Follow-Up - Clinic] : a clinic follow-up of [Coronary Artery Disease] : coronary artery disease [Hyperlipidemia] : hyperlipidemia [Hypertension] : hypertension [FreeTextEntry1] : I saw this 68-year-old man,in follow up on 10/24/19\par In July  he presented to the hospital with unstable angina, and had a stent to the ostial LCX. \par He presented 10 days ago with unstable angina and restenosis. He had PTCA and is scheduled next month for Brachytherapy\par \par He was seen on 6/3/19 for significant chest pain.  The patient stated that if he walks more than usual, he gets tightness all across the chest which gets better with resting.  Also after large meal, walking is very difficult due to the symptoms.  The symptoms are now very much disabled.  He does not get any chest pain in the resting condition.  He does not get short of breath in the resting condition, but does get shortness of breath on more than usual exertion.  He denies PND, orthopnea, diaphoresis, dizziness, palpitations, or pedal edema. \par \par He was started on Ranexa and he has significant improvement in his chronic stable angina; he can do all his activity without any CP.\par \par \par His history includes CAD, last cardiac catheterization on March 11, 2019.  He had OM stenting, previously had RCA stenting, previously he had coronary artery bypass graft surgery LIMA to LAD; dyslipidemia, diabetes, obesity, and hypothyroidism.\par

## 2019-10-24 NOTE — DISCUSSION/SUMMARY
[FreeTextEntry1] : We discussed treating his diabetes and cholesterol more aggressively and I added Zetia to his regime.\par He has been having post prandial epigastric symptoms which have been diagnosed as GB stones.\par I have referred him to a Gastroenterologist locally.\par Scheduled next month for Brachytherapy

## 2019-11-04 ENCOUNTER — MEDICATION RENEWAL (OUTPATIENT)
Age: 69
End: 2019-11-04

## 2019-12-11 ENCOUNTER — INPATIENT (INPATIENT)
Facility: HOSPITAL | Age: 69
LOS: 0 days | Discharge: HOME | DRG: 249 | End: 2019-12-12
Attending: INTERNAL MEDICINE | Admitting: INTERNAL MEDICINE
Payer: COMMERCIAL

## 2019-12-11 ENCOUNTER — TRANSCRIPTION ENCOUNTER (OUTPATIENT)
Age: 69
End: 2019-12-11

## 2019-12-11 VITALS
TEMPERATURE: 98 F | DIASTOLIC BLOOD PRESSURE: 78 MMHG | OXYGEN SATURATION: 97 % | RESPIRATION RATE: 16 BRPM | WEIGHT: 194.01 LBS | HEIGHT: 68 IN | SYSTOLIC BLOOD PRESSURE: 181 MMHG | HEART RATE: 92 BPM

## 2019-12-11 DIAGNOSIS — Z98.62 PERIPHERAL VASCULAR ANGIOPLASTY STATUS: Chronic | ICD-10-CM

## 2019-12-11 DIAGNOSIS — Z95.1 PRESENCE OF AORTOCORONARY BYPASS GRAFT: Chronic | ICD-10-CM

## 2019-12-11 DIAGNOSIS — Z95.820 PERIPHERAL VASCULAR ANGIOPLASTY STATUS WITH IMPLANTS AND GRAFTS: Chronic | ICD-10-CM

## 2019-12-11 DIAGNOSIS — I25.10 ATHEROSCLEROTIC HEART DISEASE OF NATIVE CORONARY ARTERY WITHOUT ANGINA PECTORIS: ICD-10-CM

## 2019-12-11 LAB
ANION GAP SERPL CALC-SCNC: 16 MMOL/L — SIGNIFICANT CHANGE UP (ref 5–17)
BUN SERPL-MCNC: 20 MG/DL — SIGNIFICANT CHANGE UP (ref 7–23)
CALCIUM SERPL-MCNC: 9.5 MG/DL — SIGNIFICANT CHANGE UP (ref 8.4–10.5)
CHLORIDE SERPL-SCNC: 98 MMOL/L — SIGNIFICANT CHANGE UP (ref 96–108)
CO2 SERPL-SCNC: 29 MMOL/L — SIGNIFICANT CHANGE UP (ref 22–31)
CREAT SERPL-MCNC: 1 MG/DL — SIGNIFICANT CHANGE UP (ref 0.5–1.3)
GLUCOSE SERPL-MCNC: 92 MG/DL — SIGNIFICANT CHANGE UP (ref 70–99)
HCT VFR BLD CALC: 44.1 % — SIGNIFICANT CHANGE UP (ref 39–50)
HGB BLD-MCNC: 14.7 G/DL — SIGNIFICANT CHANGE UP (ref 13–17)
MCHC RBC-ENTMCNC: 31.8 PG — SIGNIFICANT CHANGE UP (ref 27–34)
MCHC RBC-ENTMCNC: 33.3 GM/DL — SIGNIFICANT CHANGE UP (ref 32–36)
MCV RBC AUTO: 95.5 FL — SIGNIFICANT CHANGE UP (ref 80–100)
NRBC # BLD: 0 /100 WBCS — SIGNIFICANT CHANGE UP (ref 0–0)
PLATELET # BLD AUTO: 163 K/UL — SIGNIFICANT CHANGE UP (ref 150–400)
POTASSIUM SERPL-MCNC: 3.4 MMOL/L — LOW (ref 3.5–5.3)
POTASSIUM SERPL-SCNC: 3.4 MMOL/L — LOW (ref 3.5–5.3)
RBC # BLD: 4.62 M/UL — SIGNIFICANT CHANGE UP (ref 4.2–5.8)
RBC # FLD: 14 % — SIGNIFICANT CHANGE UP (ref 10.3–14.5)
SODIUM SERPL-SCNC: 140 MMOL/L — SIGNIFICANT CHANGE UP (ref 135–145)
WBC # BLD: 5.47 K/UL — SIGNIFICANT CHANGE UP (ref 3.8–10.5)
WBC # FLD AUTO: 5.47 K/UL — SIGNIFICANT CHANGE UP (ref 3.8–10.5)

## 2019-12-11 PROCEDURE — 93010 ELECTROCARDIOGRAM REPORT: CPT | Mod: 77

## 2019-12-11 PROCEDURE — 77290 THER RAD SIMULAJ FIELD CPLX: CPT | Mod: 26

## 2019-12-11 PROCEDURE — 77470 SPECIAL RADIATION TREATMENT: CPT | Mod: 26

## 2019-12-11 PROCEDURE — 92920 PRQ TRLUML C ANGIOP 1ART&/BR: CPT | Mod: LC

## 2019-12-11 PROCEDURE — 99221 1ST HOSP IP/OBS SF/LOW 40: CPT | Mod: 25

## 2019-12-11 PROCEDURE — 99152 MOD SED SAME PHYS/QHP 5/>YRS: CPT

## 2019-12-11 PROCEDURE — 77318 BRACHYTX ISODOSE COMPLEX: CPT | Mod: 26

## 2019-12-11 PROCEDURE — 92974 CATH PLACE CARDIO BRACHYTX: CPT

## 2019-12-11 PROCEDURE — 77263 THER RADIOLOGY TX PLNG CPLX: CPT

## 2019-12-11 PROCEDURE — 93010 ELECTROCARDIOGRAM REPORT: CPT

## 2019-12-11 PROCEDURE — 77770 HDR RDNCL NTRSTL/ICAV BRCHTX: CPT | Mod: 26

## 2019-12-11 RX ORDER — INSULIN GLARGINE 100 [IU]/ML
48 INJECTION, SOLUTION SUBCUTANEOUS AT BEDTIME
Refills: 0 | Status: DISCONTINUED | OUTPATIENT
Start: 2019-12-11 | End: 2019-12-12

## 2019-12-11 RX ORDER — LISINOPRIL 2.5 MG/1
40 TABLET ORAL DAILY
Refills: 0 | Status: DISCONTINUED | OUTPATIENT
Start: 2019-12-11 | End: 2019-12-12

## 2019-12-11 RX ORDER — DEXTROSE 50 % IN WATER 50 %
15 SYRINGE (ML) INTRAVENOUS ONCE
Refills: 0 | Status: DISCONTINUED | OUTPATIENT
Start: 2019-12-11 | End: 2019-12-12

## 2019-12-11 RX ORDER — INSULIN LISPRO 100/ML
VIAL (ML) SUBCUTANEOUS AT BEDTIME
Refills: 0 | Status: DISCONTINUED | OUTPATIENT
Start: 2019-12-11 | End: 2019-12-12

## 2019-12-11 RX ORDER — INSULIN LISPRO 100/ML
VIAL (ML) SUBCUTANEOUS
Refills: 0 | Status: DISCONTINUED | OUTPATIENT
Start: 2019-12-11 | End: 2019-12-12

## 2019-12-11 RX ORDER — RANOLAZINE 500 MG/1
500 TABLET, FILM COATED, EXTENDED RELEASE ORAL
Refills: 0 | Status: DISCONTINUED | OUTPATIENT
Start: 2019-12-11 | End: 2019-12-12

## 2019-12-11 RX ORDER — GLUCAGON INJECTION, SOLUTION 0.5 MG/.1ML
1 INJECTION, SOLUTION SUBCUTANEOUS ONCE
Refills: 0 | Status: DISCONTINUED | OUTPATIENT
Start: 2019-12-11 | End: 2019-12-12

## 2019-12-11 RX ORDER — ATORVASTATIN CALCIUM 80 MG/1
80 TABLET, FILM COATED ORAL AT BEDTIME
Refills: 0 | Status: DISCONTINUED | OUTPATIENT
Start: 2019-12-11 | End: 2019-12-12

## 2019-12-11 RX ORDER — INSULIN LISPRO 100/ML
4 VIAL (ML) SUBCUTANEOUS
Refills: 0 | Status: DISCONTINUED | OUTPATIENT
Start: 2019-12-11 | End: 2019-12-12

## 2019-12-11 RX ORDER — INSULIN GLARGINE 100 [IU]/ML
30 INJECTION, SOLUTION SUBCUTANEOUS ONCE
Refills: 0 | Status: COMPLETED | OUTPATIENT
Start: 2019-12-11 | End: 2019-12-11

## 2019-12-11 RX ORDER — DEXTROSE 50 % IN WATER 50 %
25 SYRINGE (ML) INTRAVENOUS ONCE
Refills: 0 | Status: DISCONTINUED | OUTPATIENT
Start: 2019-12-11 | End: 2019-12-12

## 2019-12-11 RX ORDER — SODIUM CHLORIDE 9 MG/ML
1000 INJECTION, SOLUTION INTRAVENOUS
Refills: 0 | Status: DISCONTINUED | OUTPATIENT
Start: 2019-12-11 | End: 2019-12-12

## 2019-12-11 RX ORDER — AMLODIPINE BESYLATE 2.5 MG/1
5 TABLET ORAL DAILY
Refills: 0 | Status: DISCONTINUED | OUTPATIENT
Start: 2019-12-11 | End: 2019-12-12

## 2019-12-11 RX ORDER — SODIUM CHLORIDE 9 MG/ML
3 INJECTION INTRAMUSCULAR; INTRAVENOUS; SUBCUTANEOUS EVERY 8 HOURS
Refills: 0 | Status: DISCONTINUED | OUTPATIENT
Start: 2019-12-11 | End: 2019-12-12

## 2019-12-11 RX ORDER — ASPIRIN/CALCIUM CARB/MAGNESIUM 324 MG
81 TABLET ORAL DAILY
Refills: 0 | Status: DISCONTINUED | OUTPATIENT
Start: 2019-12-11 | End: 2019-12-12

## 2019-12-11 RX ORDER — POTASSIUM BICARBONATE 978 MG/1
25 TABLET, EFFERVESCENT ORAL
Refills: 0 | Status: COMPLETED | OUTPATIENT
Start: 2019-12-11 | End: 2019-12-11

## 2019-12-11 RX ORDER — TICAGRELOR 90 MG/1
90 TABLET ORAL
Refills: 0 | Status: DISCONTINUED | OUTPATIENT
Start: 2019-12-11 | End: 2019-12-12

## 2019-12-11 RX ORDER — METOPROLOL TARTRATE 50 MG
1 TABLET ORAL
Qty: 0 | Refills: 0 | DISCHARGE

## 2019-12-11 RX ORDER — PANTOPRAZOLE SODIUM 20 MG/1
40 TABLET, DELAYED RELEASE ORAL
Refills: 0 | Status: DISCONTINUED | OUTPATIENT
Start: 2019-12-11 | End: 2019-12-12

## 2019-12-11 RX ORDER — INSULIN ASPART 100 [IU]/ML
0 INJECTION, SOLUTION SUBCUTANEOUS
Qty: 0 | Refills: 0 | DISCHARGE

## 2019-12-11 RX ORDER — DEXTROSE 50 % IN WATER 50 %
12.5 SYRINGE (ML) INTRAVENOUS ONCE
Refills: 0 | Status: DISCONTINUED | OUTPATIENT
Start: 2019-12-11 | End: 2019-12-12

## 2019-12-11 RX ORDER — METOPROLOL TARTRATE 50 MG
12.5 TABLET ORAL
Refills: 0 | Status: DISCONTINUED | OUTPATIENT
Start: 2019-12-11 | End: 2019-12-12

## 2019-12-11 RX ADMIN — RANOLAZINE 500 MILLIGRAM(S): 500 TABLET, FILM COATED, EXTENDED RELEASE ORAL at 17:14

## 2019-12-11 RX ADMIN — POTASSIUM BICARBONATE 25 MILLIEQUIVALENT(S): 978 TABLET, EFFERVESCENT ORAL at 09:30

## 2019-12-11 RX ADMIN — SODIUM CHLORIDE 3 MILLILITER(S): 9 INJECTION INTRAMUSCULAR; INTRAVENOUS; SUBCUTANEOUS at 21:18

## 2019-12-11 RX ADMIN — POTASSIUM BICARBONATE 25 MILLIEQUIVALENT(S): 978 TABLET, EFFERVESCENT ORAL at 09:47

## 2019-12-11 RX ADMIN — Medication 12.5 MILLIGRAM(S): at 17:14

## 2019-12-11 RX ADMIN — INSULIN GLARGINE 30 UNIT(S): 100 INJECTION, SOLUTION SUBCUTANEOUS at 23:51

## 2019-12-11 RX ADMIN — ATORVASTATIN CALCIUM 80 MILLIGRAM(S): 80 TABLET, FILM COATED ORAL at 21:36

## 2019-12-11 RX ADMIN — SODIUM CHLORIDE 3 MILLILITER(S): 9 INJECTION INTRAMUSCULAR; INTRAVENOUS; SUBCUTANEOUS at 14:15

## 2019-12-11 RX ADMIN — TICAGRELOR 90 MILLIGRAM(S): 90 TABLET ORAL at 21:40

## 2019-12-11 NOTE — PROCEDURAL SAFETY CHECKLIST WITH OR WITHOUT SEDATION - NSCNFIRMBLDLOSS_GEN_ALL_CORE
small traces of blood, see procedure comments/done small traces of bright red blood, see procedure comments/done

## 2019-12-11 NOTE — DISCHARGE NOTE PROVIDER - CARE PROVIDER_API CALL
Reji Thomas)  Cardiovascular Disease; Internal Medicine; Interventional Cardiology  [address] [place] [address]  Phone: [phone]  Fax: [phone]  Follow Up Time: 2 weeks

## 2019-12-11 NOTE — DISCHARGE NOTE NURSING/CASE MANAGEMENT/SOCIAL WORK - PATIENT PORTAL LINK FT
You can access the FollowMyHealth Patient Portal offered by [facility] by registering at the following website: [email or web address]. By joining [employer]’s FollowMyHealth portal, you will also be able to view your health information using other applications (apps) compatible with our system.

## 2019-12-11 NOTE — DISCHARGE NOTE PROVIDER - NSDCCPCAREPLAN_GEN_ALL_CORE_FT
PRINCIPAL DISCHARGE DIAGNOSIS  Diagnosis: Coronary artery disease  Assessment and Plan of Treatment: Low salt, low fat diet.   Weight management.   Take medications as prescribed.    No smoking.  Follow up appointments with your doctor(s)  as instruced.   No heavy lifting for 2 weeks, no strenuous activity  or uneccesary stair climbing, no driving for x 2 days,  you may shower 24 hours following procedure but no bathing or swimming for x1  week, no bending, no straining while having a Bowel movement, No strenuous sexual activity for x 1 week check groin for bleeding, pain, tightness or ( golf ball size)  swelling daily following procedure , & follow up with your cardiologist in 1-2 week      SECONDARY DISCHARGE DIAGNOSES  Diagnosis: HLD (hyperlipidemia)  Assessment and Plan of Treatment: Continue with your cholesterol medications. Eat a heart healthy diet that is low in saturated fats and salt, and includes whole grains, fruits, vegetables and lean protein; exercise regularly (consult with your physician or cardiologist first); maintain a heart healthy weight; if you smoke - quit (A resource to help you stop smoking is the [facility] – phone number [phone].). Continue to follow with your primary physician or cardiologist.    Diagnosis: Diabetes mellitus  Assessment and Plan of Treatment: Continue to follow with your primary care MD or your endocrinologist. Discuss what the goal hemoglobin A1C level is for you.  Follow a heart healthy diabetic diet. If you check your fingerstick glucose at home, call your MD if it is greater than 250mg/dL on 2 occasions or less than 100mg/dL on 2 occasions. Know signs of low blood sugar, such as: dizziness, shakiness, sweating, confusion, hunger, nervousness- drink 4 ounces apple juice if occurs and call your doctor. Know early signs of high blood sugar, such as: frequent urination, increased thirst, blurry vision, fatigue, headache - call your doctor if this occurs.

## 2019-12-11 NOTE — H&P CARDIOLOGY - PSH
S/P angioplasty  KELLY VILLEDA 10/15/19  S/P angioplasty with stent  x 3 last stent 7 months ago with Dr. Thomas - OM1  S/P CABG x 3  25 years ago

## 2019-12-11 NOTE — CHART NOTE - NSCHARTNOTEFT_GEN_A_CORE
Removal of Femoral Sheath    Pulses in the right lower extremity are palpable. The patient was placed in the supine position. The insertion site was identified and the sutures were removed per protocol.  The _6___ Mong[place]ian femoral sheath was then removed by LILLI Wagner NP without incident. Direct pressure was applied for  __20____ minutes.     Monitoring of the right groin and both lower extremities including neuro-vascular checks and vital signs every 15 minutes x 4, then every 30 minutes x 2, then every 1 hour was ordered.    Complications: None    Comments:  Activity restrictions and reportable symptoms discussed with patient with appropriate teach back

## 2019-12-11 NOTE — DISCHARGE NOTE PROVIDER - NSDCCPTREATMENT_GEN_ALL_CORE_FT
PRINCIPAL PROCEDURE  Procedure: PTCA, with intravascular brachytherapy  Findings and Treatment: s/p POBA and Brachytherapy to OM1

## 2019-12-11 NOTE — DISCHARGE NOTE PROVIDER - NSDCMRMEDTOKEN_GEN_ALL_CORE_FT
amLODIPine 5 mg oral tablet: 1 tab(s) orally once a day  aspirin 81 mg oral delayed release tablet: 1 tab(s) orally once a day  atorvastatin 80 mg oral tablet: 1 tab(s) orally once a day  Co Q-10: 400 milligram(s) orally once a day  dapagliflozin 10 mg oral tablet: 1 tab(s) orally once a day  ezetimibe 10 mg oral tablet: 1 tab(s) orally once a day  Levemir FlexPen 100 units/mL subcutaneous solution: 60 unit(s) subcutaneous once a day (at bedtime)  Metoprolol Tartrate 25 mg oral tablet: 0.5 tab(s) orally 2 times a day  NovoLOG FlexPen 100 units/mL injectable solution: 5 unit(s) injectable 3 times a day (before meals)  Omega-3 350 mg oral capsule: 2 cap(s) orally 2 times a day  pantoprazole 40 mg oral delayed release tablet: 1 tab(s) orally once a day  ramipril 10 mg oral tablet: 1 tab(s) orally once a day  ranolazine 500 mg oral tablet, extended release: 1 tab(s) orally 2 times a day  ticagrelor 90 mg oral tablet: 1 tab(s) orally 2 times a day  Victoza 18 mg/3 mL subcutaneous solution: 3 milliliter(s) subcutaneous once a day

## 2019-12-11 NOTE — PROCEDURAL SAFETY CHECKLIST WITH OR WITHOUT SEDATION - NSPOSTCOMMENTFT_GEN_ALL_CORE
Pt. straight cathed as per CHEO Lopez's orders. Pt.'s hx. checked prior to straight cathing. Sterile technique maintained. Successful after one attempt. Pt. tolerated procedure well. Greater than 500 mL of yellow urine obtained. Upon removing catheter, small trace of bright red blood noted. Both CHEO Lopez and CHEO Porras made aware. No continuous bleeding noted. Pt. remained A&Ox4 and VSS  throughout duration of procedure. See comments under 1620 vitals in [facility] Vitals Flowsheet.

## 2019-12-11 NOTE — H&P CARDIOLOGY - HISTORY OF PRESENT ILLNESS
68 y/o  Male with FH of CAD and pmh of HTN, HLD, DMT2 (Controlled, uncomplicated AIc 6.8), CAD s/p 3V CABG (25 years ago); PCI s/p stents x 3 (OM, RCA, and ostial LCx 7/2019); NSTEMI 10/15/19 s/p POBA to OM1 (on ASA/Brilinta), GERD (scheduled for EGD with Dr. Katina Oropeza on 12/30/19) followed by Dr. Reji Thomas, Cardiologist continue with intermittent epigastric burning after meals and when lying flat with improvement in his LYONS able to walk up a flight of stairs without symptoms post POBA.  He does still reports having "labored breathing" after walking more than 3 blocks and resolves after resting a few minutes.  He presents for Brachytherapy with Dr. Mosquera and denies cp, sob or palpitations presently.  He denies implantable devices.      PCP - Dr. Frank Amico  Endocrinologist - Dr. Craig Perlman    Of Note: Pt took his Lantus full dose 60 units and Farxiga this am with BS 91 and did not eat prior to arriving.  Pt ate half a turkey sandwich.  Will monitor as per protocol.    Narrative:     Symptoms:        Angina (Class): II       Ischemic Symptoms: LYONS    Heart Failure: N/A       Systolic/Diastolic/Combined:        NYHA Class (within 2 weeks):     Assessment of LVEF: Not available       EF:        Assessed by:        Date:     Prior Cardiac Interventions:       PCI's: 3 stents last one 9/2019 of LCX and POBA to OM1 10/15/19       CABG: 3V 25 years ago    Noninvasive Testing: Not Available  Stress Test: Date:        Protocol:        Duration of Exercise:        Symptoms:        EKG Changes:        DTS:        Myocardial Imaging:        Risk Assessment:     Echo: Not Available    Antianginal Therapies:        Beta Blockers:  Metoprolol       Calcium Channel Blockers: Amlodipine       Long Acting Nitrates: No       Ranexa: Yes    Associated Risk Factors:        Cerebrovascular Disease: N/A       Chronic Lung Disease: N/A       Peripheral Arterial Disease: N/A       Chronic Kidney Disease (if yes, what is GFR): N/A       Uncontrolled Diabetes (if yes, what is HgbA1C or FBS): N/A       Poorly Controlled Hypertension (if yes, what is SBP): N/A       Morbid Obesity (if yes, what is BMI): N/A       History of Recent Ventricular Arrhythmia: N/A       Inability to Ambulate Safely: N/A       Need for Therapeutic Anticoagulation: N/A       Antiplatelet or Contrast Allergy: N/A 68 y/o  Male with FH of CAD and pmh of HTN, HLD, DMT2 (Controlled, uncomplicated AIc 6.8), CAD s/p 3V CABG (25 years ago); PCI s/p stents x 3 (OM, RCA, and ostial LCx 7/2019); NSTEMI 10/15/19 s/p POBA to OM1 (on ASA/Brilinta), GERD (scheduled for EGD with Dr. Katina Oropeza on 12/30/19) followed by Dr. Reji Thomas, Cardiologist continue with intermittent epigastric burning after meals and when lying flat with improvement in his LYONS able to walk up a flight of stairs without symptoms post POBA.  He does still reports having "labored breathing" after walking more than 3 blocks and resolves after resting a few minutes.  He presents for Brachytherapy with Dr. Mosquera and denies cp, sob or palpitations presently.  He denies implantable devices.      PCP - Dr. Frank Amico  Endocrinologist - Dr. Craig Perlman    Of Note: Pt took his Lantus full dose 60 units and Farxiga this am with BS 91 and did not eat prior to arriving.  Pt ate half a turkey sandwich.  Will monitor as per protocol.    Symptoms:        Angina (Class): II       Ischemic Symptoms: LYONS    Heart Failure: N/A       Systolic/Diastolic/Combined:        NYHA Class (within 2 weeks):     Assessment of LVEF: Not available       EF:        Assessed by:        Date:     Prior Cardiac Interventions:       PCI's: 3 stents last one 9/2019 of LCX and POBA to OM1 10/15/19       CABG: 3V 25 years ago    Noninvasive Testing: Not Available  Stress Test: Date:        Protocol:        Duration of Exercise:        Symptoms:        EKG Changes:        DTS:        Myocardial Imaging:        Risk Assessment:     Echo: Not Available    Antianginal Therapies:        Beta Blockers:  Metoprolol       Calcium Channel Blockers: Amlodipine       Long Acting Nitrates: No       Ranexa: Yes    Associated Risk Factors:        Cerebrovascular Disease: N/A       Chronic Lung Disease: N/A       Peripheral Arterial Disease: N/A       Chronic Kidney Disease (if yes, what is GFR): N/A       Uncontrolled Diabetes (if yes, what is HgbA1C or FBS): N/A       Poorly Controlled Hypertension (if yes, what is SBP): N/A       Morbid Obesity (if yes, what is BMI): N/A       History of Recent Ventricular Arrhythmia: N/A       Inability to Ambulate Safely: N/A       Need for Therapeutic Anticoagulation: N/A       Antiplatelet or Contrast Allergy: N/A

## 2019-12-12 ENCOUNTER — MEDICATION RENEWAL (OUTPATIENT)
Age: 69
End: 2019-12-12

## 2019-12-12 ENCOUNTER — INBOUND DOCUMENT (OUTPATIENT)
Age: 69
End: 2019-12-12

## 2019-12-12 VITALS
TEMPERATURE: 98 F | DIASTOLIC BLOOD PRESSURE: 58 MMHG | RESPIRATION RATE: 17 BRPM | HEART RATE: 80 BPM | OXYGEN SATURATION: 97 % | SYSTOLIC BLOOD PRESSURE: 106 MMHG

## 2019-12-12 DIAGNOSIS — E11.9 TYPE 2 DIABETES MELLITUS WITHOUT COMPLICATIONS: ICD-10-CM

## 2019-12-12 DIAGNOSIS — I25.10 ATHEROSCLEROTIC HEART DISEASE OF NATIVE CORONARY ARTERY WITHOUT ANGINA PECTORIS: ICD-10-CM

## 2019-12-12 DIAGNOSIS — I10 ESSENTIAL (PRIMARY) HYPERTENSION: ICD-10-CM

## 2019-12-12 DIAGNOSIS — E78.5 HYPERLIPIDEMIA, UNSPECIFIED: ICD-10-CM

## 2019-12-12 LAB
ANION GAP SERPL CALC-SCNC: 13 MMOL/L — SIGNIFICANT CHANGE UP (ref 5–17)
BUN SERPL-MCNC: 18 MG/DL — SIGNIFICANT CHANGE UP (ref 7–23)
CALCIUM SERPL-MCNC: 9.5 MG/DL — SIGNIFICANT CHANGE UP (ref 8.4–10.5)
CHLORIDE SERPL-SCNC: 99 MMOL/L — SIGNIFICANT CHANGE UP (ref 96–108)
CO2 SERPL-SCNC: 25 MMOL/L — SIGNIFICANT CHANGE UP (ref 22–31)
CREAT SERPL-MCNC: 0.99 MG/DL — SIGNIFICANT CHANGE UP (ref 0.5–1.3)
GLUCOSE SERPL-MCNC: 145 MG/DL — HIGH (ref 70–99)
HCT VFR BLD CALC: 41.1 % — SIGNIFICANT CHANGE UP (ref 39–50)
HGB BLD-MCNC: 13.9 G/DL — SIGNIFICANT CHANGE UP (ref 13–17)
MCHC RBC-ENTMCNC: 32.2 PG — SIGNIFICANT CHANGE UP (ref 27–34)
MCHC RBC-ENTMCNC: 33.8 GM/DL — SIGNIFICANT CHANGE UP (ref 32–36)
MCV RBC AUTO: 95.1 FL — SIGNIFICANT CHANGE UP (ref 80–100)
NRBC # BLD: 0 /100 WBCS — SIGNIFICANT CHANGE UP (ref 0–0)
PLATELET # BLD AUTO: 139 K/UL — LOW (ref 150–400)
POTASSIUM SERPL-MCNC: 4.3 MMOL/L — SIGNIFICANT CHANGE UP (ref 3.5–5.3)
POTASSIUM SERPL-SCNC: 4.3 MMOL/L — SIGNIFICANT CHANGE UP (ref 3.5–5.3)
RBC # BLD: 4.32 M/UL — SIGNIFICANT CHANGE UP (ref 4.2–5.8)
RBC # FLD: 14.1 % — SIGNIFICANT CHANGE UP (ref 10.3–14.5)
SODIUM SERPL-SCNC: 137 MMOL/L — SIGNIFICANT CHANGE UP (ref 135–145)
WBC # BLD: 6.2 K/UL — SIGNIFICANT CHANGE UP (ref 3.8–10.5)
WBC # FLD AUTO: 6.2 K/UL — SIGNIFICANT CHANGE UP (ref 3.8–10.5)

## 2019-12-12 PROCEDURE — C1769: CPT

## 2019-12-12 PROCEDURE — C1887: CPT

## 2019-12-12 PROCEDURE — 92920 PRQ TRLUML C ANGIOP 1ART&/BR: CPT | Mod: LC

## 2019-12-12 PROCEDURE — 77770 HDR RDNCL NTRSTL/ICAV BRCHTX: CPT

## 2019-12-12 PROCEDURE — C1728: CPT

## 2019-12-12 PROCEDURE — 77470 SPECIAL RADIATION TREATMENT: CPT

## 2019-12-12 PROCEDURE — 80048 BASIC METABOLIC PNL TOTAL CA: CPT

## 2019-12-12 PROCEDURE — C1725: CPT

## 2019-12-12 PROCEDURE — C1894: CPT

## 2019-12-12 PROCEDURE — 77370 RADIATION PHYSICS CONSULT: CPT

## 2019-12-12 PROCEDURE — 85027 COMPLETE CBC AUTOMATED: CPT

## 2019-12-12 PROCEDURE — 82962 GLUCOSE BLOOD TEST: CPT

## 2019-12-12 PROCEDURE — 99152 MOD SED SAME PHYS/QHP 5/>YRS: CPT

## 2019-12-12 PROCEDURE — 77290 THER RAD SIMULAJ FIELD CPLX: CPT

## 2019-12-12 PROCEDURE — 99238 HOSP IP/OBS DSCHRG MGMT 30/<: CPT

## 2019-12-12 PROCEDURE — 92974 CATH PLACE CARDIO BRACHYTX: CPT

## 2019-12-12 PROCEDURE — C1717: CPT

## 2019-12-12 PROCEDURE — 99153 MOD SED SAME PHYS/QHP EA: CPT

## 2019-12-12 PROCEDURE — 93005 ELECTROCARDIOGRAM TRACING: CPT

## 2019-12-12 PROCEDURE — 77318 BRACHYTX ISODOSE COMPLEX: CPT

## 2019-12-12 RX ADMIN — PANTOPRAZOLE SODIUM 40 MILLIGRAM(S): 20 TABLET, DELAYED RELEASE ORAL at 05:53

## 2019-12-12 RX ADMIN — TICAGRELOR 90 MILLIGRAM(S): 90 TABLET ORAL at 05:56

## 2019-12-12 RX ADMIN — AMLODIPINE BESYLATE 5 MILLIGRAM(S): 2.5 TABLET ORAL at 05:53

## 2019-12-12 RX ADMIN — LISINOPRIL 40 MILLIGRAM(S): 2.5 TABLET ORAL at 05:56

## 2019-12-12 RX ADMIN — Medication 12.5 MILLIGRAM(S): at 05:53

## 2019-12-12 RX ADMIN — SODIUM CHLORIDE 3 MILLILITER(S): 9 INJECTION INTRAMUSCULAR; INTRAVENOUS; SUBCUTANEOUS at 05:10

## 2019-12-12 RX ADMIN — RANOLAZINE 500 MILLIGRAM(S): 500 TABLET, FILM COATED, EXTENDED RELEASE ORAL at 05:53

## 2019-12-12 RX ADMIN — Medication 81 MILLIGRAM(S): at 05:53

## 2019-12-12 NOTE — PROGRESS NOTE ADULT - PROBLEM SELECTOR PLAN 1
Pt remains chest pain free and understands post cath discharge instructions   No heavy lifting, strenuous activity, bending, straining or unnecessary stair climbing  for 2 weeks. No sex for 1 week.  No driving for 2 days. You may shower 24 hours following procedure but avoid baths and swimming for 1 week. Check groin site for bleeding and/or swelling daily following procedure. Call your doctor/cardiologist immediately should it occur or if you have increased/persistent pain at the site. Follow up with your cardiologist in 1- 2 weeks. You may call [facility] at [phone] or [phone] after office hours and weekends  with any questions or concerns following your procedure. Take medications as prescribed.

## 2019-12-12 NOTE — PROGRESS NOTE ADULT - SUBJECTIVE AND OBJECTIVE BOX
Patient is a 69y old  Male who presents with a chief complaint of In-stent restenosis (11 Dec 2019 19:35) now ss/p POBA and jayme to OM1 via RFV access.           Allergies    No Known Allergies    Intolerances        Medications:  amLODIPine   Tablet 5 milliGRAM(s) Oral daily  aspirin enteric coated 81 milliGRAM(s) Oral daily  atorvastatin 80 milliGRAM(s) Oral at bedtime  dextrose 40% Gel 15 Gram(s) Oral once PRN  dextrose 5%. 1000 milliLiter(s) IV Continuous <Continuous>  dextrose 50% Injectable 12.5 Gram(s) IV Push once  dextrose 50% Injectable 25 Gram(s) IV Push once  dextrose 50% Injectable 25 Gram(s) IV Push once  glucagon  Injectable 1 milliGRAM(s) IntraMuscular once PRN  insulin glargine Injectable (LANTUS) 48 Unit(s) SubCutaneous at bedtime  insulin lispro (HumaLOG) corrective regimen sliding scale   SubCutaneous three times a day before meals  insulin lispro (HumaLOG) corrective regimen sliding scale   SubCutaneous at bedtime  insulin lispro Injectable (HumaLOG) 4 Unit(s) SubCutaneous before breakfast  insulin lispro Injectable (HumaLOG) 4 Unit(s) SubCutaneous before lunch  insulin lispro Injectable (HumaLOG) 4 Unit(s) SubCutaneous before dinner  lisinopril 40 milliGRAM(s) Oral daily  metoprolol tartrate 12.5 milliGRAM(s) Oral two times a day  pantoprazole    Tablet 40 milliGRAM(s) Oral before breakfast  ranolazine 500 milliGRAM(s) Oral two times a day  sodium chloride 0.9% lock flush 3 milliLiter(s) IV Push every 8 hours  ticagrelor 90 milliGRAM(s) Oral two times a day      Vitals:  T(C): 36.9 (19 @ 20:20), Max: 36.9 (19 @ 20:20)  HR: 71 (19 @ 20:20) (71 - 92)  BP: 123/72 (19 @ 20:20) (120/73 - 181/78)  BP(mean): 112 (19 @ 07:38) (112 - 112)  RR: 17 (19 @ 20:20) (16 - 17)  SpO2: 98% (12-11-19 @ 20:20) (96% - 100%)  Wt(kg): --  Daily Height in cm: 172.72 (11 Dec 2019 13:35)    Daily Weight in k (11 Dec 2019 13:35)  I&O's Summary    11 Dec 2019 07:01  -  12 Dec 2019 01:21  --------------------------------------------------------  IN: 480 mL / OUT: 0 mL / NET: 480 mL          Physical Exam:  Appearance: Normal  Eyes: PERRL, EOMI  HENT: Normal oral muscosa, NC/AT  Procedural Access Site: RFV access. No hematoma, Non-tender to palpation, 2+ pulse, No bruit, No Ecchymosis  Respiratory: Clear to auscultation bilaterally  Gastrointestinal: Soft, Non tender, Normal Bowel Sounds  Musculoskeletal: No clubbing, No joint deformity   Neurologic: Non-focal  Lymphatic: No lymphadenopathy  Psychiatry: AAOx3, Mood & affect appropriate  Skin: No rashes, No ecchymoses, No cyanosis        137  |  99  |  18  ----------------------------<  145<H>  4.3   |  25  |  0.99    Ca    9.5      12 Dec 2019 00:17        Interpretation of Telemetry:

## 2019-12-12 NOTE — PROGRESS NOTE ADULT - PROBLEM SELECTOR PLAN 3
Your LDL cholesterol will be less than 70mg/dL   Continue with your cholesterol medications. Eat a heart healthy diet that is low in saturated fats and salt, and includes whole grains, fruits, vegetables and lean protein; exercise regularly (consult with your physician or cardiologist first); maintain a heart healthy weight. Continue to follow with your primary physician or cardiologist for treatment goals, continue medication, have liver function testing every 3 months as anti lipid medications can cause liver irritation. If you smoke - quit (A resource to help you stop smoking is the [facility] – phone number [phone].).

## 2019-12-12 NOTE — PROGRESS NOTE ADULT - PROBLEM SELECTOR PLAN 4
Your blood pressure will be controlled.   Continue with your blood pressure medications; eat a heart healthy diet with low salt diet; exercise regularly (consult with your physician or cardiologist first); maintain a heart healthy weight; if you smoke - quit (A resource to help you stop smoking is the [facility] – phone number [phone].); include healthy ways to manage stress. Continue to follow with your primary care physician or cardiologist.

## 2019-12-12 NOTE — PROGRESS NOTE ADULT - ASSESSMENT
Patient is a 69y old  Male who presents with a chief complaint of In-stent restenosis (11 Dec 2019 19:35) now ss/p POBA and jayme to OM1 via RFV access. Pt tolerated the procedure well; cardiac cath site benign. Post-procedure discharge instructions discussed and questions addressed

## 2019-12-19 ENCOUNTER — NON-APPOINTMENT (OUTPATIENT)
Age: 69
End: 2019-12-19

## 2019-12-19 ENCOUNTER — APPOINTMENT (OUTPATIENT)
Dept: CARDIOLOGY | Facility: CLINIC | Age: 69
End: 2019-12-19
Payer: MEDICARE

## 2019-12-19 VITALS
BODY MASS INDEX: 30.31 KG/M2 | WEIGHT: 200 LBS | SYSTOLIC BLOOD PRESSURE: 124 MMHG | DIASTOLIC BLOOD PRESSURE: 70 MMHG | OXYGEN SATURATION: 96 % | HEART RATE: 78 BPM | HEIGHT: 68 IN

## 2019-12-19 PROCEDURE — 93000 ELECTROCARDIOGRAM COMPLETE: CPT

## 2019-12-19 PROCEDURE — 99215 OFFICE O/P EST HI 40 MIN: CPT

## 2019-12-19 NOTE — REASON FOR VISIT
[Follow-Up - Clinic] : a clinic follow-up of [Coronary Artery Disease] : coronary artery disease [Hyperlipidemia] : hyperlipidemia [Hypertension] : hypertension [FreeTextEntry1] : I saw this 69-year-old man,in follow up on 12/19/19\par In July  he presented to the hospital with unstable angina, and had a stent to the ostial LCX. \par He presented 10 days ago with unstable angina and restenosis. He had PTCA and is scheduled next month for Brachytherapy\par \par He was seen on 6/3/19 for significant chest pain.  The patient stated that if he walks more than usual, he gets tightness all across the chest which gets better with resting.  Also after large meal, walking is very difficult due to the symptoms.  The symptoms are now very much disabled.  He does not get any chest pain in the resting condition.  He does not get short of breath in the resting condition, but does get shortness of breath on more than usual exertion.  He denies PND, orthopnea, diaphoresis, dizziness, palpitations, or pedal edema. \par \par He was started on Ranexa and he has significant improvement in his chronic stable angina; he can do all his activity without any CP.\par \par \par His history includes CAD, last cardiac catheterization on March 11, 2019.  He had OM stenting, previously had RCA stenting, previously he had coronary artery bypass graft surgery LIMA to LAD; dyslipidemia, diabetes, obesity, and hypothyroidism.\par Repeated restenosis, had brachytherapy last week to the LCX\par

## 2019-12-19 NOTE — PHYSICAL EXAM
[Well Groomed] : well groomed [Normal Appearance] : normal appearance [General Appearance - Well Developed] : well developed [No Deformities] : no deformities [General Appearance - In No Acute Distress] : no acute distress [General Appearance - Well Nourished] : well nourished [Normal Conjunctiva] : the conjunctiva exhibited no abnormalities [Normal Oral Mucosa] : normal oral mucosa [Eyelids - No Xanthelasma] : the eyelids demonstrated no xanthelasmas [No Oral Pallor] : no oral pallor [No Oral Cyanosis] : no oral cyanosis [No Jugular Venous Lopez A Waves] : no jugular venous lopez A waves [Normal Jugular Venous V Waves Present] : normal jugular venous V waves present [Normal Jugular Venous A Waves Present] : normal jugular venous A waves present [Respiration, Rhythm And Depth] : normal respiratory rhythm and effort [Exaggerated Use Of Accessory Muscles For Inspiration] : no accessory muscle use [Auscultation Breath Sounds / Voice Sounds] : lungs were clear to auscultation bilaterally [Heart Rate And Rhythm] : heart rate and rhythm were normal [Murmurs] : no murmurs present [Heart Sounds] : normal S1 and S2 [Abdomen Soft] : soft [Abdomen Tenderness] : non-tender [Abdomen Mass (___ Cm)] : no abdominal mass palpated [Abnormal Walk] : normal gait [Gait - Sufficient For Exercise Testing] : the gait was sufficient for exercise testing [Nail Clubbing] : no clubbing of the fingernails [Cyanosis, Localized] : no localized cyanosis [Petechial Hemorrhages (___cm)] : no petechial hemorrhages [Skin Color & Pigmentation] : normal skin color and pigmentation [] : no ischemic changes [No Venous Stasis] : no venous stasis [No Skin Ulcers] : no skin ulcer [Skin Lesions] : no skin lesions [Oriented To Time, Place, And Person] : oriented to person, place, and time [No Xanthoma] : no  xanthoma was observed [Affect] : the affect was normal [Mood] : the mood was normal [No Anxiety] : not feeling anxious

## 2019-12-19 NOTE — DISCUSSION/SUMMARY
[FreeTextEntry1] : Remain on all meds.\par Cleared for endoscopy and colonoscopy, but cannot stop either the aspirin or Brilinta.

## 2019-12-30 ENCOUNTER — OUTPATIENT (OUTPATIENT)
Dept: OUTPATIENT SERVICES | Facility: HOSPITAL | Age: 69
LOS: 1 days | End: 2019-12-30

## 2019-12-30 DIAGNOSIS — Z95.1 PRESENCE OF AORTOCORONARY BYPASS GRAFT: Chronic | ICD-10-CM

## 2019-12-30 DIAGNOSIS — Z95.820 PERIPHERAL VASCULAR ANGIOPLASTY STATUS WITH IMPLANTS AND GRAFTS: Chronic | ICD-10-CM

## 2019-12-30 DIAGNOSIS — Z98.62 PERIPHERAL VASCULAR ANGIOPLASTY STATUS: Chronic | ICD-10-CM

## 2020-02-04 ENCOUNTER — NON-APPOINTMENT (OUTPATIENT)
Age: 70
End: 2020-02-04

## 2020-02-04 ENCOUNTER — APPOINTMENT (OUTPATIENT)
Dept: CARDIOLOGY | Facility: CLINIC | Age: 70
End: 2020-02-04
Payer: MEDICARE

## 2020-02-04 VITALS
WEIGHT: 194 LBS | HEIGHT: 68 IN | HEART RATE: 85 BPM | SYSTOLIC BLOOD PRESSURE: 118 MMHG | DIASTOLIC BLOOD PRESSURE: 76 MMHG | BODY MASS INDEX: 29.4 KG/M2 | OXYGEN SATURATION: 98 %

## 2020-02-04 PROCEDURE — 93000 ELECTROCARDIOGRAM COMPLETE: CPT

## 2020-02-04 PROCEDURE — 99215 OFFICE O/P EST HI 40 MIN: CPT

## 2020-02-04 RX ORDER — RABEPRAZOLE SODIUM 20 MG/1
TABLET, DELAYED RELEASE ORAL
Refills: 0 | Status: DISCONTINUED | COMMUNITY
End: 2020-02-04

## 2020-02-04 RX ORDER — THYROID, PORCINE 90 MG/1
TABLET ORAL
Refills: 0 | Status: DISCONTINUED | COMMUNITY
End: 2020-02-04

## 2020-02-04 RX ORDER — DEXLANSOPRAZOLE 60 MG/1
60 CAPSULE, DELAYED RELEASE ORAL DAILY
Refills: 0 | Status: ACTIVE | COMMUNITY
Start: 2020-02-04

## 2020-02-04 RX ORDER — ASPIRIN 81 MG
81 TABLET, DELAYED RELEASE (ENTERIC COATED) ORAL
Refills: 0 | Status: ACTIVE | COMMUNITY

## 2020-02-04 RX ORDER — INSULIN ASPART 100 [IU]/ML
INJECTION, SOLUTION INTRAVENOUS; SUBCUTANEOUS
Refills: 0 | Status: ACTIVE | COMMUNITY

## 2020-02-04 RX ORDER — RAMIPRIL 10 MG/1
10 CAPSULE ORAL
Qty: 30 | Refills: 0 | Status: DISCONTINUED | COMMUNITY
Start: 2018-05-24 | End: 2020-02-04

## 2020-02-04 RX ORDER — INSULIN DETEMIR 100 [IU]/ML
100 INJECTION, SOLUTION SUBCUTANEOUS
Refills: 0 | Status: ACTIVE | COMMUNITY

## 2020-02-04 RX ORDER — AMLODIPINE BESYLATE 5 MG/1
5 TABLET ORAL
Qty: 90 | Refills: 1 | Status: DISCONTINUED | COMMUNITY
Start: 1900-01-01 | End: 2020-02-04

## 2020-02-04 RX ORDER — UBIDECARENONE 400 MG
400 CAPSULE ORAL DAILY
Refills: 0 | Status: ACTIVE | COMMUNITY

## 2020-02-04 NOTE — REASON FOR VISIT
[Follow-Up - Clinic] : a clinic follow-up of [Hyperlipidemia] : hyperlipidemia [Coronary Artery Disease] : coronary artery disease [Hypertension] : hypertension [FreeTextEntry1] : I saw this 69-year-old man,in follow up on 02/04/20\par In July  he presented to the hospital with unstable angina, and had a stent to the ostial LCX. \par He presented in December  with unstable angina and restenosis. He had PTCA and then brachytherapy.\par .  He denies PND, orthopnea, diaphoresis, dizziness, palpitations, or pedal edema. \par \par His history includes CAD, last cardiac catheterization on March 11, 2019.  He had OM stenting, previously had RCA stenting, previously he had coronary artery bypass graft surgery LIMA to LAD; dyslipidemia, diabetes, obesity, and hypothyroidism.\par Unfortunately he has had 2 episodes of exertional angina, and may be on his way to restenosis again.\par

## 2020-02-04 NOTE — DISCUSSION/SUMMARY
[FreeTextEntry1] : I stopped the Norvasc and Ramapril in order to increase the metoprolol for better hr control. 25mg TID\par If he has anymore angina will recath.

## 2020-02-04 NOTE — PHYSICAL EXAM
[General Appearance - Well Developed] : well developed [Normal Appearance] : normal appearance [Well Groomed] : well groomed [General Appearance - Well Nourished] : well nourished [No Deformities] : no deformities [General Appearance - In No Acute Distress] : no acute distress [Normal Conjunctiva] : the conjunctiva exhibited no abnormalities [Eyelids - No Xanthelasma] : the eyelids demonstrated no xanthelasmas [No Oral Pallor] : no oral pallor [Normal Oral Mucosa] : normal oral mucosa [No Oral Cyanosis] : no oral cyanosis [Normal Jugular Venous A Waves Present] : normal jugular venous A waves present [Normal Jugular Venous V Waves Present] : normal jugular venous V waves present [No Jugular Venous Lopez A Waves] : no jugular venous lopez A waves [Respiration, Rhythm And Depth] : normal respiratory rhythm and effort [Auscultation Breath Sounds / Voice Sounds] : lungs were clear to auscultation bilaterally [Exaggerated Use Of Accessory Muscles For Inspiration] : no accessory muscle use [Heart Rate And Rhythm] : heart rate and rhythm were normal [Heart Sounds] : normal S1 and S2 [Abdomen Soft] : soft [Murmurs] : no murmurs present [Abdomen Tenderness] : non-tender [Abdomen Mass (___ Cm)] : no abdominal mass palpated [Gait - Sufficient For Exercise Testing] : the gait was sufficient for exercise testing [Abnormal Walk] : normal gait [Nail Clubbing] : no clubbing of the fingernails [Cyanosis, Localized] : no localized cyanosis [Petechial Hemorrhages (___cm)] : no petechial hemorrhages [Skin Color & Pigmentation] : normal skin color and pigmentation [] : no rash [No Venous Stasis] : no venous stasis [Skin Lesions] : no skin lesions [No Skin Ulcers] : no skin ulcer [No Xanthoma] : no  xanthoma was observed [Oriented To Time, Place, And Person] : oriented to person, place, and time [Mood] : the mood was normal [Affect] : the affect was normal [No Anxiety] : not feeling anxious

## 2020-02-04 NOTE — HISTORY OF PRESENT ILLNESS
[FreeTextEntry1] : \par he has had 2 episodes of exertional angina. \par He has no shortness of breath\par He has no palpitations\par He has no syncope\par He is neurologically intact\par He has no edema\par He has no GI symptoms\par

## 2020-03-03 ENCOUNTER — OUTPATIENT (OUTPATIENT)
Dept: OUTPATIENT SERVICES | Facility: HOSPITAL | Age: 70
LOS: 1 days | End: 2020-03-03
Payer: MEDICARE

## 2020-03-03 DIAGNOSIS — Z95.1 PRESENCE OF AORTOCORONARY BYPASS GRAFT: Chronic | ICD-10-CM

## 2020-03-03 DIAGNOSIS — Z98.62 PERIPHERAL VASCULAR ANGIOPLASTY STATUS: Chronic | ICD-10-CM

## 2020-03-03 DIAGNOSIS — Z95.820 PERIPHERAL VASCULAR ANGIOPLASTY STATUS WITH IMPLANTS AND GRAFTS: Chronic | ICD-10-CM

## 2020-03-03 PROCEDURE — 78264 GASTRIC EMPTYING IMG STUDY: CPT | Mod: 26

## 2020-03-05 ENCOUNTER — APPOINTMENT (OUTPATIENT)
Dept: CARDIOLOGY | Facility: CLINIC | Age: 70
End: 2020-03-05
Payer: MEDICARE

## 2020-03-05 ENCOUNTER — NON-APPOINTMENT (OUTPATIENT)
Age: 70
End: 2020-03-05

## 2020-03-05 VITALS
BODY MASS INDEX: 29.4 KG/M2 | OXYGEN SATURATION: 97 % | DIASTOLIC BLOOD PRESSURE: 80 MMHG | WEIGHT: 194 LBS | HEIGHT: 68 IN | HEART RATE: 101 BPM | SYSTOLIC BLOOD PRESSURE: 136 MMHG

## 2020-03-05 PROCEDURE — 99215 OFFICE O/P EST HI 40 MIN: CPT

## 2020-03-05 PROCEDURE — 93000 ELECTROCARDIOGRAM COMPLETE: CPT

## 2020-03-05 NOTE — DISCUSSION/SUMMARY
[FreeTextEntry1] : I stopped the Norvasc and Ramapril in order to increase the metoprolol for better hr control. 25mg TID\par He had  angina again, so  will recath.

## 2020-03-05 NOTE — REASON FOR VISIT
[Follow-Up - Clinic] : a clinic follow-up of [Coronary Artery Disease] : coronary artery disease [Hyperlipidemia] : hyperlipidemia [Hypertension] : hypertension [FreeTextEntry1] : I saw this 69-year-old man,in follow up on 03/05/20.\par He has started having exertional angina again, 2 episodes last week.  This sounds like he has restenosis of the circumflex again.  He will be scheduled to undergo cardiac cath tomorrow.\par In July  he presented to the hospital with unstable angina, and had a stent to the ostial LCX. \par He presented in December  with unstable angina and restenosis. He had PTCA and then brachytherapy.\par .  He denies PND, orthopnea, diaphoresis, dizziness, palpitations, or pedal edema. \par \par His history includes CAD, last cardiac catheterization on March 11, 2019.  He had OM stenting, previously had RCA stenting, previously he had coronary artery bypass graft surgery LIMA to LAD; dyslipidemia, diabetes, obesity, and hypothyroidism.\par Unfortunately he has had 2 episodes of exertional angina, and may be on his way to restenosis again.\par

## 2020-03-06 ENCOUNTER — INPATIENT (INPATIENT)
Facility: HOSPITAL | Age: 70
LOS: 0 days | Discharge: HOME | End: 2020-03-07
Attending: INTERNAL MEDICINE | Admitting: INTERNAL MEDICINE
Payer: MEDICARE

## 2020-03-06 DIAGNOSIS — Z95.820 PERIPHERAL VASCULAR ANGIOPLASTY STATUS WITH IMPLANTS AND GRAFTS: Chronic | ICD-10-CM

## 2020-03-06 DIAGNOSIS — Z95.1 PRESENCE OF AORTOCORONARY BYPASS GRAFT: Chronic | ICD-10-CM

## 2020-03-06 DIAGNOSIS — Z98.62 PERIPHERAL VASCULAR ANGIOPLASTY STATUS: Chronic | ICD-10-CM

## 2020-03-06 PROCEDURE — 93306 TTE W/DOPPLER COMPLETE: CPT | Mod: 26

## 2020-03-06 PROCEDURE — 93454 CORONARY ARTERY ANGIO S&I: CPT | Mod: 26,XU

## 2020-03-06 PROCEDURE — 92929: CPT | Mod: LC

## 2020-03-06 PROCEDURE — 92928 PRQ TCAT PLMT NTRAC ST 1 LES: CPT | Mod: LC

## 2020-03-07 PROCEDURE — 99233 SBSQ HOSP IP/OBS HIGH 50: CPT

## 2020-03-10 ENCOUNTER — APPOINTMENT (OUTPATIENT)
Dept: CARDIOLOGY | Facility: CLINIC | Age: 70
End: 2020-03-10
Payer: MEDICARE

## 2020-03-10 VITALS
WEIGHT: 193 LBS | BODY MASS INDEX: 29.25 KG/M2 | HEART RATE: 70 BPM | SYSTOLIC BLOOD PRESSURE: 132 MMHG | DIASTOLIC BLOOD PRESSURE: 84 MMHG | HEIGHT: 68 IN | OXYGEN SATURATION: 97 %

## 2020-03-10 DIAGNOSIS — Z86.79 PERSONAL HISTORY OF OTHER DISEASES OF THE CIRCULATORY SYSTEM: ICD-10-CM

## 2020-03-10 DIAGNOSIS — Z83.3 FAMILY HISTORY OF DIABETES MELLITUS: ICD-10-CM

## 2020-03-10 DIAGNOSIS — Z78.9 OTHER SPECIFIED HEALTH STATUS: ICD-10-CM

## 2020-03-10 DIAGNOSIS — Z82.49 FAMILY HISTORY OF ISCHEMIC HEART DISEASE AND OTHER DISEASES OF THE CIRCULATORY SYSTEM: ICD-10-CM

## 2020-03-10 DIAGNOSIS — Z80.9 FAMILY HISTORY OF MALIGNANT NEOPLASM, UNSPECIFIED: ICD-10-CM

## 2020-03-10 DIAGNOSIS — Z86.39 PERSONAL HISTORY OF OTHER ENDOCRINE, NUTRITIONAL AND METABOLIC DISEASE: ICD-10-CM

## 2020-03-10 PROCEDURE — 99215 OFFICE O/P EST HI 40 MIN: CPT

## 2020-03-10 NOTE — REASON FOR VISIT
[Follow-Up - Clinic] : a clinic follow-up of [Coronary Artery Disease] : coronary artery disease [Hyperlipidemia] : hyperlipidemia [Hypertension] : hypertension [FreeTextEntry1] : I saw this 69-year-old man,in follow up on 03/10/20.\par He has started having exertional angina again, 2 episodes last week.  This sounds like he has restenosis of the circumflex again. Repeat cath. showed restenosis and he was restented, LCX and OM1.\par I\par \par His history includes CAD, last cardiac catheterization on March 11, 2019.  He had OM stenting, previously had RCA stenting, previously he had coronary artery bypass graft surgery LIMA to LAD; dyslipidemia, diabetes, obesity, and hypothyroidism.\par Groin has some ecchymoses, no lumps.\par

## 2020-03-10 NOTE — HISTORY OF PRESENT ILLNESS
[FreeTextEntry1] : \par he has had no chest pain\par He has no shortness of breath\par He has no palpitations\par He has no syncope\par He is neurologically intact\par He has no edema\par He has no GI symptoms\par

## 2020-03-10 NOTE — DISCUSSION/SUMMARY
[FreeTextEntry1] : I stopped the Norvasc and Ramapril in order to increase the metoprolol for better hr control. 25mg TID\par No more angina, doing well.\par Return in 3 months\par No cath. site issues.

## 2020-04-16 ENCOUNTER — APPOINTMENT (OUTPATIENT)
Dept: CARDIOLOGY | Facility: CLINIC | Age: 70
End: 2020-04-16

## 2020-05-06 ENCOUNTER — RX RENEWAL (OUTPATIENT)
Age: 70
End: 2020-05-06

## 2020-06-16 ENCOUNTER — NON-APPOINTMENT (OUTPATIENT)
Age: 70
End: 2020-06-16

## 2020-06-16 ENCOUNTER — APPOINTMENT (OUTPATIENT)
Dept: CARDIOLOGY | Facility: CLINIC | Age: 70
End: 2020-06-16
Payer: MEDICARE

## 2020-06-16 VITALS
TEMPERATURE: 97.9 F | BODY MASS INDEX: 30.41 KG/M2 | HEART RATE: 84 BPM | SYSTOLIC BLOOD PRESSURE: 134 MMHG | WEIGHT: 200 LBS | OXYGEN SATURATION: 97 % | DIASTOLIC BLOOD PRESSURE: 74 MMHG

## 2020-06-16 PROCEDURE — 99215 OFFICE O/P EST HI 40 MIN: CPT

## 2020-06-16 PROCEDURE — 93000 ELECTROCARDIOGRAM COMPLETE: CPT

## 2020-06-16 NOTE — PHYSICAL EXAM
[Normal Appearance] : normal appearance [General Appearance - Well Developed] : well developed [Well Groomed] : well groomed [General Appearance - Well Nourished] : well nourished [No Deformities] : no deformities [General Appearance - In No Acute Distress] : no acute distress [Normal Conjunctiva] : the conjunctiva exhibited no abnormalities [Eyelids - No Xanthelasma] : the eyelids demonstrated no xanthelasmas [No Oral Pallor] : no oral pallor [Normal Oral Mucosa] : normal oral mucosa [No Oral Cyanosis] : no oral cyanosis [Normal Jugular Venous A Waves Present] : normal jugular venous A waves present [No Jugular Venous Lopez A Waves] : no jugular venous lopez A waves [Normal Jugular Venous V Waves Present] : normal jugular venous V waves present [Respiration, Rhythm And Depth] : normal respiratory rhythm and effort [Exaggerated Use Of Accessory Muscles For Inspiration] : no accessory muscle use [Auscultation Breath Sounds / Voice Sounds] : lungs were clear to auscultation bilaterally [Heart Rate And Rhythm] : heart rate and rhythm were normal [Heart Sounds] : normal S1 and S2 [Murmurs] : no murmurs present [Abdomen Soft] : soft [Abdomen Mass (___ Cm)] : no abdominal mass palpated [Abdomen Tenderness] : non-tender [Gait - Sufficient For Exercise Testing] : the gait was sufficient for exercise testing [Abnormal Walk] : normal gait [Cyanosis, Localized] : no localized cyanosis [Petechial Hemorrhages (___cm)] : no petechial hemorrhages [Nail Clubbing] : no clubbing of the fingernails [] : no ischemic changes [Skin Color & Pigmentation] : normal skin color and pigmentation [No Skin Ulcers] : no skin ulcer [Skin Lesions] : no skin lesions [No Venous Stasis] : no venous stasis [No Xanthoma] : no  xanthoma was observed [Affect] : the affect was normal [Oriented To Time, Place, And Person] : oriented to person, place, and time [Mood] : the mood was normal [No Anxiety] : not feeling anxious

## 2020-06-16 NOTE — REASON FOR VISIT
[Follow-Up - Clinic] : a clinic follow-up of [Coronary Artery Disease] : coronary artery disease [Hyperlipidemia] : hyperlipidemia [Hypertension] : hypertension [FreeTextEntry1] : I saw this 69-year-old man,in follow up on 06/16/20.\par He has been free of any symptoms now for 3-4 months since the last procedure.\par \par His history includes CAD, last cardiac catheterization on March 11, 2019.  He had OM stenting, previously had RCA stenting, previously he had coronary artery bypass graft surgery LIMA to LAD; dyslipidemia, diabetes, obesity, and hypothyroidism.\par Groin has some ecchymoses, no lumps.\par

## 2020-06-16 NOTE — DISCUSSION/SUMMARY
[FreeTextEntry1] : I stopped the Norvasc and Ramapril in order to increase the metoprolol for better hr control. 25mg TID\par No more angina, doing well.\par Return in 3 months\par No cath. site issues.\par Started on Niacin to elevate HDL

## 2020-07-17 ENCOUNTER — RX RENEWAL (OUTPATIENT)
Age: 70
End: 2020-07-17

## 2020-08-08 PROCEDURE — 99285 EMERGENCY DEPT VISIT HI MDM: CPT | Mod: CS

## 2020-08-08 PROCEDURE — 93010 ELECTROCARDIOGRAM REPORT: CPT

## 2020-08-08 PROCEDURE — 71045 X-RAY EXAM CHEST 1 VIEW: CPT | Mod: 26

## 2020-08-09 ENCOUNTER — INPATIENT (INPATIENT)
Facility: HOSPITAL | Age: 70
LOS: 1 days | Discharge: HOME | End: 2020-08-11
Attending: STUDENT IN AN ORGANIZED HEALTH CARE EDUCATION/TRAINING PROGRAM
Payer: MEDICARE

## 2020-08-09 ENCOUNTER — OUTPATIENT (OUTPATIENT)
Dept: OUTPATIENT SERVICES | Facility: HOSPITAL | Age: 70
LOS: 1 days | End: 2020-08-09

## 2020-08-09 DIAGNOSIS — Z98.62 PERIPHERAL VASCULAR ANGIOPLASTY STATUS: Chronic | ICD-10-CM

## 2020-08-09 DIAGNOSIS — Z95.820 PERIPHERAL VASCULAR ANGIOPLASTY STATUS WITH IMPLANTS AND GRAFTS: Chronic | ICD-10-CM

## 2020-08-09 DIAGNOSIS — Z95.1 PRESENCE OF AORTOCORONARY BYPASS GRAFT: Chronic | ICD-10-CM

## 2020-08-09 PROCEDURE — 99223 1ST HOSP IP/OBS HIGH 75: CPT

## 2020-08-09 PROCEDURE — 93010 ELECTROCARDIOGRAM REPORT: CPT | Mod: 76

## 2020-08-09 PROCEDURE — 74175 CTA ABDOMEN W/CONTRAST: CPT | Mod: 26

## 2020-08-09 PROCEDURE — 71275 CT ANGIOGRAPHY CHEST: CPT | Mod: 26

## 2020-08-10 PROCEDURE — 99232 SBSQ HOSP IP/OBS MODERATE 35: CPT

## 2020-08-11 ENCOUNTER — OUTPATIENT (OUTPATIENT)
Dept: OUTPATIENT SERVICES | Facility: HOSPITAL | Age: 70
LOS: 1 days | End: 2020-08-11

## 2020-08-11 DIAGNOSIS — Z95.1 PRESENCE OF AORTOCORONARY BYPASS GRAFT: Chronic | ICD-10-CM

## 2020-08-11 DIAGNOSIS — Z95.820 PERIPHERAL VASCULAR ANGIOPLASTY STATUS WITH IMPLANTS AND GRAFTS: Chronic | ICD-10-CM

## 2020-08-11 DIAGNOSIS — Z98.62 PERIPHERAL VASCULAR ANGIOPLASTY STATUS: Chronic | ICD-10-CM

## 2020-08-11 PROCEDURE — 99233 SBSQ HOSP IP/OBS HIGH 50: CPT

## 2020-08-17 ENCOUNTER — APPOINTMENT (OUTPATIENT)
Dept: CARDIOLOGY | Facility: CLINIC | Age: 70
End: 2020-08-17
Payer: MEDICARE

## 2020-08-17 ENCOUNTER — NON-APPOINTMENT (OUTPATIENT)
Age: 70
End: 2020-08-17

## 2020-08-17 VITALS
DIASTOLIC BLOOD PRESSURE: 80 MMHG | WEIGHT: 202 LBS | BODY MASS INDEX: 30.62 KG/M2 | TEMPERATURE: 97.3 F | HEIGHT: 68 IN | HEART RATE: 90 BPM | SYSTOLIC BLOOD PRESSURE: 136 MMHG | OXYGEN SATURATION: 98 %

## 2020-08-17 PROCEDURE — 99214 OFFICE O/P EST MOD 30 MIN: CPT

## 2020-08-17 PROCEDURE — 93000 ELECTROCARDIOGRAM COMPLETE: CPT

## 2020-08-17 RX ORDER — METOPROLOL TARTRATE 25 MG/1
25 TABLET, FILM COATED ORAL
Qty: 270 | Refills: 1 | Status: DISCONTINUED | COMMUNITY
End: 2020-08-17

## 2020-08-19 RX ORDER — EVOLOCUMAB 140 MG/ML
140 INJECTION, SOLUTION SUBCUTANEOUS
Qty: 6 | Refills: 3 | Status: DISCONTINUED | COMMUNITY
Start: 2020-08-17 | End: 2020-08-19

## 2020-08-25 ENCOUNTER — OUTPATIENT (OUTPATIENT)
Dept: OUTPATIENT SERVICES | Facility: HOSPITAL | Age: 70
LOS: 1 days | End: 2020-08-25

## 2020-08-25 DIAGNOSIS — Z95.820 PERIPHERAL VASCULAR ANGIOPLASTY STATUS WITH IMPLANTS AND GRAFTS: Chronic | ICD-10-CM

## 2020-08-25 DIAGNOSIS — Z95.1 PRESENCE OF AORTOCORONARY BYPASS GRAFT: Chronic | ICD-10-CM

## 2020-08-25 DIAGNOSIS — Z98.62 PERIPHERAL VASCULAR ANGIOPLASTY STATUS: Chronic | ICD-10-CM

## 2020-09-22 ENCOUNTER — APPOINTMENT (OUTPATIENT)
Dept: CARDIOLOGY | Facility: CLINIC | Age: 70
End: 2020-09-22
Payer: MEDICARE

## 2020-09-22 ENCOUNTER — NON-APPOINTMENT (OUTPATIENT)
Age: 70
End: 2020-09-22

## 2020-09-22 VITALS
BODY MASS INDEX: 30.31 KG/M2 | DIASTOLIC BLOOD PRESSURE: 70 MMHG | OXYGEN SATURATION: 96 % | HEIGHT: 68 IN | HEART RATE: 96 BPM | WEIGHT: 200 LBS | TEMPERATURE: 97.3 F | SYSTOLIC BLOOD PRESSURE: 140 MMHG

## 2020-09-22 PROCEDURE — 99215 OFFICE O/P EST HI 40 MIN: CPT

## 2020-09-22 PROCEDURE — 93000 ELECTROCARDIOGRAM COMPLETE: CPT

## 2020-09-22 NOTE — HISTORY OF PRESENT ILLNESS
[FreeTextEntry1] : \par he has had no further chest pain\par He has no shortness of breath\par He has no palpitations\par He has no syncope\par He is neurologically intact\par He has no edema\par He has no GI symptoms\par

## 2020-09-22 NOTE — DISCUSSION/SUMMARY
[FreeTextEntry1] : I stopped the Norvasc and Ramapril in order to increase the metoprolol for better hr control. 25mg TID\par No more angina, doing well, since stenting of left main\par Return in 3 months\par No cath. site issues.\par Started on Praluent with excellent reduction of lipids

## 2020-09-22 NOTE — REASON FOR VISIT
[Follow-Up - Clinic] : a clinic follow-up of [Coronary Artery Disease] : coronary artery disease [Hyperlipidemia] : hyperlipidemia [Hypertension] : hypertension [FreeTextEntry1] : I saw this 69-year-old man,in follow up on 09/22/20.\par He has been free of any symptoms now for one  month since the last procedure, stenting of the Left main.\par \par His history includes CAD, last cardiac catheterization on March 11, 2019.  He had OM stenting, previously had RCA stenting, previously he had coronary artery bypass graft surgery LIMA to LAD; dyslipidemia, diabetes, obesity, and hypothyroidism.\par Groin has some ecchymoses, no lumps.\par

## 2020-10-26 ENCOUNTER — NON-APPOINTMENT (OUTPATIENT)
Age: 70
End: 2020-10-26

## 2020-10-27 ENCOUNTER — OUTPATIENT (OUTPATIENT)
Dept: OUTPATIENT SERVICES | Facility: HOSPITAL | Age: 70
LOS: 1 days | End: 2020-10-27

## 2020-10-27 ENCOUNTER — NON-APPOINTMENT (OUTPATIENT)
Age: 70
End: 2020-10-27

## 2020-10-27 ENCOUNTER — APPOINTMENT (OUTPATIENT)
Dept: CARDIOLOGY | Facility: CLINIC | Age: 70
End: 2020-10-27
Payer: MEDICARE

## 2020-10-27 VITALS
HEIGHT: 68 IN | HEART RATE: 100 BPM | TEMPERATURE: 97.5 F | OXYGEN SATURATION: 98 % | WEIGHT: 200 LBS | SYSTOLIC BLOOD PRESSURE: 130 MMHG | DIASTOLIC BLOOD PRESSURE: 86 MMHG | BODY MASS INDEX: 30.31 KG/M2

## 2020-10-27 DIAGNOSIS — Z95.820 PERIPHERAL VASCULAR ANGIOPLASTY STATUS WITH IMPLANTS AND GRAFTS: Chronic | ICD-10-CM

## 2020-10-27 DIAGNOSIS — Z95.1 PRESENCE OF AORTOCORONARY BYPASS GRAFT: Chronic | ICD-10-CM

## 2020-10-27 DIAGNOSIS — Z98.62 PERIPHERAL VASCULAR ANGIOPLASTY STATUS: Chronic | ICD-10-CM

## 2020-10-27 PROCEDURE — 93000 ELECTROCARDIOGRAM COMPLETE: CPT

## 2020-10-27 PROCEDURE — 99215 OFFICE O/P EST HI 40 MIN: CPT

## 2020-10-27 NOTE — REASON FOR VISIT
[Follow-Up - Clinic] : a clinic follow-up of [Coronary Artery Disease] : coronary artery disease [Hyperlipidemia] : hyperlipidemia [Hypertension] : hypertension [FreeTextEntry1] : I saw this 69-year-old man,in follow up on 10/27/20.\par He now comes in again with accelerated angina after having stenting of his left main 2 months ago.\par He will be recath tomorrow.\par He had radiation therapy to the left main and circumflex 10 months ago\par His history includes CAD, last cardiac catheterization on March 11, 2019.  He had OM stenting, previously had RCA stenting, previously he had coronary artery bypass graft surgery LIMA to LAD; dyslipidemia, diabetes, obesity, and hypothyroidism.\par \par

## 2020-10-27 NOTE — DISCUSSION/SUMMARY
[FreeTextEntry1] : I stopped the Norvasc and Ramapril in order to increase the metoprolol for better hr control. 25mg TID\par New onset unstable angina\par Recath. tomorrow\par Started on Praluent with excellent reduction of lipids

## 2020-10-28 ENCOUNTER — OUTPATIENT (OUTPATIENT)
Dept: OUTPATIENT SERVICES | Facility: HOSPITAL | Age: 70
LOS: 1 days | End: 2020-10-28
Payer: MEDICARE

## 2020-10-28 DIAGNOSIS — Z95.1 PRESENCE OF AORTOCORONARY BYPASS GRAFT: Chronic | ICD-10-CM

## 2020-10-28 DIAGNOSIS — Z95.820 PERIPHERAL VASCULAR ANGIOPLASTY STATUS WITH IMPLANTS AND GRAFTS: Chronic | ICD-10-CM

## 2020-10-28 DIAGNOSIS — Z98.62 PERIPHERAL VASCULAR ANGIOPLASTY STATUS: Chronic | ICD-10-CM

## 2020-10-28 PROCEDURE — 93454 CORONARY ARTERY ANGIO S&I: CPT | Mod: 26,XU

## 2020-10-28 PROCEDURE — 92929: CPT | Mod: LC

## 2020-10-28 PROCEDURE — 92928 PRQ TCAT PLMT NTRAC ST 1 LES: CPT | Mod: LC

## 2020-10-28 PROCEDURE — 93010 ELECTROCARDIOGRAM REPORT: CPT | Mod: 76

## 2020-10-29 PROCEDURE — 93010 ELECTROCARDIOGRAM REPORT: CPT

## 2020-11-03 ENCOUNTER — APPOINTMENT (OUTPATIENT)
Dept: CARDIOLOGY | Facility: CLINIC | Age: 70
End: 2020-11-03
Payer: MEDICARE

## 2020-11-03 ENCOUNTER — NON-APPOINTMENT (OUTPATIENT)
Age: 70
End: 2020-11-03

## 2020-11-03 VITALS
HEIGHT: 68 IN | WEIGHT: 200 LBS | TEMPERATURE: 97.1 F | HEART RATE: 81 BPM | OXYGEN SATURATION: 98 % | BODY MASS INDEX: 30.31 KG/M2 | DIASTOLIC BLOOD PRESSURE: 86 MMHG | SYSTOLIC BLOOD PRESSURE: 140 MMHG

## 2020-11-03 DIAGNOSIS — K30 FUNCTIONAL DYSPEPSIA: ICD-10-CM

## 2020-11-03 PROCEDURE — 93000 ELECTROCARDIOGRAM COMPLETE: CPT

## 2020-11-03 PROCEDURE — 99215 OFFICE O/P EST HI 40 MIN: CPT

## 2020-11-03 NOTE — HISTORY OF PRESENT ILLNESS
[FreeTextEntry1] : \par he has had no further chest pain\par He has no shortness of breath\par He has no palpitations\par He has no syncope\par He is neurologically intact\par He has no edema\par He has no GI symptoms\par Ecchymotic access site. No hematoma\par

## 2020-11-03 NOTE — DISCUSSION/SUMMARY
[FreeTextEntry1] : I stopped the Norvasc and Ramapril in order to increase the metoprolol for better hr control. 25mg TID\par Started on Praluent with excellent reduction of lipids

## 2020-11-03 NOTE — REASON FOR VISIT
[Follow-Up - Clinic] : a clinic follow-up of [Coronary Artery Disease] : coronary artery disease [Hyperlipidemia] : hyperlipidemia [Hypertension] : hypertension [FreeTextEntry1] : I saw this 69-year-old man,in follow up on 11/03/20.\par He now comes in again with accelerated angina after having stenting of his left main 2 months ago.\par On recath there was restenosis of the LCX and OM1, both of which had Brachytherapy 10 months ago.\par The ostial left main looked fine.\par He had radiation therapy to the left main and circumflex 10 months ago\par His history includes CAD, last cardiac catheterization on March 11, 2019.  He had OM stenting, previously had RCA stenting, previously he had coronary artery bypass graft surgery LIMA to LAD; dyslipidemia, diabetes, obesity, and hypothyroidism.\par \par

## 2020-12-22 ENCOUNTER — APPOINTMENT (OUTPATIENT)
Dept: CARDIOLOGY | Facility: CLINIC | Age: 70
End: 2020-12-22

## 2021-01-05 ENCOUNTER — APPOINTMENT (OUTPATIENT)
Dept: CARDIOLOGY | Facility: CLINIC | Age: 71
End: 2021-01-05
Payer: MEDICARE

## 2021-01-05 VITALS
OXYGEN SATURATION: 97 % | DIASTOLIC BLOOD PRESSURE: 86 MMHG | TEMPERATURE: 97.1 F | HEIGHT: 68 IN | BODY MASS INDEX: 30.31 KG/M2 | WEIGHT: 200 LBS | HEART RATE: 92 BPM | SYSTOLIC BLOOD PRESSURE: 140 MMHG

## 2021-01-05 PROCEDURE — 99215 OFFICE O/P EST HI 40 MIN: CPT

## 2021-01-05 NOTE — DISCUSSION/SUMMARY
[FreeTextEntry1] : I had stopped the Norvasc and Ramapril in order to increase the metoprolol for better hr control. 50mg QD\par Started on Praluent with excellent reduction of lipids

## 2021-01-05 NOTE — HISTORY OF PRESENT ILLNESS
[FreeTextEntry1] : \par he has had one episode chest pain\par He has no shortness of breath\par He has no palpitations\par He has no syncope\par He is neurologically intact\par He has no edema\par He has no GI symptoms\par Ecchymotic access site. No hematoma\par

## 2021-01-05 NOTE — REASON FOR VISIT
[Follow-Up - Clinic] : a clinic follow-up of [Coronary Artery Disease] : coronary artery disease [Hyperlipidemia] : hyperlipidemia [Hypertension] : hypertension [FreeTextEntry1] : I saw this 70-year-old man,in follow up on 01/05/21\par He  comes in today for routine f/u. Had one episode of exertional angina in the cold weather walking a long distance\par He had radiation therapy to the left main and circumflex 12 months ago\par His history includes CAD, last cardiac catheterization on March 11, 2019.  He had OM stenting, previously had RCA stenting, previously he had coronary artery bypass graft surgery LIMA to LAD; dyslipidemia, diabetes, obesity, and hypothyroidism.\par \par

## 2021-01-07 ENCOUNTER — LABORATORY RESULT (OUTPATIENT)
Age: 71
End: 2021-01-07

## 2021-04-08 ENCOUNTER — APPOINTMENT (OUTPATIENT)
Dept: CARDIOLOGY | Facility: CLINIC | Age: 71
End: 2021-04-08
Payer: MEDICARE

## 2021-04-08 ENCOUNTER — NON-APPOINTMENT (OUTPATIENT)
Age: 71
End: 2021-04-08

## 2021-04-08 VITALS
SYSTOLIC BLOOD PRESSURE: 160 MMHG | OXYGEN SATURATION: 95 % | HEIGHT: 68 IN | HEART RATE: 94 BPM | TEMPERATURE: 97.8 F | DIASTOLIC BLOOD PRESSURE: 90 MMHG | BODY MASS INDEX: 30.31 KG/M2 | WEIGHT: 200 LBS

## 2021-04-08 DIAGNOSIS — Z79.899 OTHER LONG TERM (CURRENT) DRUG THERAPY: ICD-10-CM

## 2021-04-08 PROCEDURE — 99215 OFFICE O/P EST HI 40 MIN: CPT

## 2021-04-08 PROCEDURE — 93000 ELECTROCARDIOGRAM COMPLETE: CPT

## 2021-04-08 RX ORDER — LIRAGLUTIDE 6 MG/ML
18 INJECTION SUBCUTANEOUS
Refills: 0 | Status: DISCONTINUED | COMMUNITY
End: 2021-04-08

## 2021-04-08 RX ORDER — OMEGA-3-ACID ETHYL ESTERS CAPSULES 1 G/1
1 CAPSULE, LIQUID FILLED ORAL DAILY
Qty: 180 | Refills: 2 | Status: ACTIVE | COMMUNITY
Start: 2021-01-01

## 2021-04-08 RX ORDER — TURMERIC ROOT EXTRACT 500 MG
TABLET ORAL
Refills: 0 | Status: ACTIVE | COMMUNITY

## 2021-04-08 RX ORDER — LIRAGLUTIDE 6 MG/ML
18 INJECTION SUBCUTANEOUS
Qty: 18 | Refills: 0 | Status: DISCONTINUED | COMMUNITY
Start: 2020-09-04 | End: 2021-04-08

## 2021-04-08 RX ORDER — SEMAGLUTIDE 1.34 MG/ML
2 INJECTION, SOLUTION SUBCUTANEOUS
Refills: 0 | Status: ACTIVE | COMMUNITY

## 2021-04-08 NOTE — DISCUSSION/SUMMARY
[FreeTextEntry1] : I increased the metoprolol to 100 mg daily\par He will be scheduled for a repeat cardiac cath

## 2021-04-08 NOTE — REASON FOR VISIT
[Follow-Up - Clinic] : a clinic follow-up of [Coronary Artery Disease] : coronary artery disease [Hyperlipidemia] : hyperlipidemia [Hypertension] : hypertension [FreeTextEntry1] : I saw this 70-year-old man,in follow up on 04/08/21\par He  comes in today for routine f/u.  He is beginning to experience  exertional angina in the morning when he takes the garbage out.  This has been occurring for 6 weeks\par He had radiation therapy to the left main and circumflex \par His history includes CAD,.  He had OM stenting, previously had RCA stenting, previously he had coronary artery bypass graft surgery LIMA to LAD; dyslipidemia, diabetes, obesity, and hypothyroidism.\par \par

## 2021-04-08 NOTE — PHYSICAL EXAM
[General Appearance - Well Developed] : well developed [Normal Appearance] : normal appearance [Well Groomed] : well groomed [General Appearance - Well Nourished] : well nourished [No Deformities] : no deformities [General Appearance - In No Acute Distress] : no acute distress [Normal Conjunctiva] : the conjunctiva exhibited no abnormalities [Eyelids - No Xanthelasma] : the eyelids demonstrated no xanthelasmas [Normal Oral Mucosa] : normal oral mucosa [No Oral Pallor] : no oral pallor [No Oral Cyanosis] : no oral cyanosis [Normal Jugular Venous A Waves Present] : normal jugular venous A waves present [Normal Jugular Venous V Waves Present] : normal jugular venous V waves present [No Jugular Venous Lopez A Waves] : no jugular venous lopez A waves [Respiration, Rhythm And Depth] : normal respiratory rhythm and effort [Exaggerated Use Of Accessory Muscles For Inspiration] : no accessory muscle use [Auscultation Breath Sounds / Voice Sounds] : lungs were clear to auscultation bilaterally [Heart Rate And Rhythm] : heart rate and rhythm were normal [Heart Sounds] : normal S1 and S2 [Murmurs] : no murmurs present [Abdomen Soft] : soft [Abdomen Tenderness] : non-tender [Abdomen Mass (___ Cm)] : no abdominal mass palpated [Abnormal Walk] : normal gait [Gait - Sufficient For Exercise Testing] : the gait was sufficient for exercise testing [Nail Clubbing] : no clubbing of the fingernails [Cyanosis, Localized] : no localized cyanosis [Petechial Hemorrhages (___cm)] : no petechial hemorrhages [] : no rash [Skin Color & Pigmentation] : normal skin color and pigmentation [No Venous Stasis] : no venous stasis [Skin Lesions] : no skin lesions [No Skin Ulcers] : no skin ulcer [No Xanthoma] : no  xanthoma was observed [Oriented To Time, Place, And Person] : oriented to person, place, and time [Affect] : the affect was normal [Mood] : the mood was normal [No Anxiety] : not feeling anxious

## 2021-04-08 NOTE — HISTORY OF PRESENT ILLNESS
[FreeTextEntry1] : \par he has had exertional chest pain\par He has no shortness of breath\par He has no palpitations\par He has no syncope\par He is neurologically intact\par He has no edema\par He has no GI symptoms\par Ecchymotic access site. No hematoma\par

## 2021-04-14 ENCOUNTER — OUTPATIENT (OUTPATIENT)
Dept: OUTPATIENT SERVICES | Facility: HOSPITAL | Age: 71
LOS: 1 days | End: 2021-04-14

## 2021-04-14 DIAGNOSIS — Z95.1 PRESENCE OF AORTOCORONARY BYPASS GRAFT: Chronic | ICD-10-CM

## 2021-04-14 DIAGNOSIS — Z95.820 PERIPHERAL VASCULAR ANGIOPLASTY STATUS WITH IMPLANTS AND GRAFTS: Chronic | ICD-10-CM

## 2021-04-14 DIAGNOSIS — Z98.62 PERIPHERAL VASCULAR ANGIOPLASTY STATUS: Chronic | ICD-10-CM

## 2021-04-15 ENCOUNTER — NON-APPOINTMENT (OUTPATIENT)
Age: 71
End: 2021-04-15

## 2021-04-18 ENCOUNTER — APPOINTMENT (OUTPATIENT)
Dept: DISASTER EMERGENCY | Facility: CLINIC | Age: 71
End: 2021-04-18

## 2021-04-18 DIAGNOSIS — Z01.818 ENCOUNTER FOR OTHER PREPROCEDURAL EXAMINATION: ICD-10-CM

## 2021-04-19 LAB — SARS-COV-2 N GENE NPH QL NAA+PROBE: NOT DETECTED

## 2021-04-21 ENCOUNTER — OUTPATIENT (OUTPATIENT)
Dept: INPATIENT UNIT | Facility: HOSPITAL | Age: 71
LOS: 1 days | End: 2021-04-21
Payer: MEDICARE

## 2021-04-21 DIAGNOSIS — Z98.62 PERIPHERAL VASCULAR ANGIOPLASTY STATUS: Chronic | ICD-10-CM

## 2021-04-21 DIAGNOSIS — Z95.1 PRESENCE OF AORTOCORONARY BYPASS GRAFT: Chronic | ICD-10-CM

## 2021-04-21 DIAGNOSIS — Z95.820 PERIPHERAL VASCULAR ANGIOPLASTY STATUS WITH IMPLANTS AND GRAFTS: Chronic | ICD-10-CM

## 2021-04-21 PROCEDURE — 93010 ELECTROCARDIOGRAM REPORT: CPT

## 2021-04-21 PROCEDURE — 93459 L HRT ART/GRFT ANGIO: CPT | Mod: 26

## 2021-04-26 ENCOUNTER — APPOINTMENT (OUTPATIENT)
Dept: CARDIOLOGY | Facility: CLINIC | Age: 71
End: 2021-04-26
Payer: MEDICARE

## 2021-04-26 VITALS
HEART RATE: 76 BPM | DIASTOLIC BLOOD PRESSURE: 72 MMHG | TEMPERATURE: 98.4 F | SYSTOLIC BLOOD PRESSURE: 128 MMHG | HEIGHT: 68 IN | WEIGHT: 206 LBS | BODY MASS INDEX: 31.22 KG/M2 | OXYGEN SATURATION: 96 %

## 2021-04-26 PROCEDURE — 99214 OFFICE O/P EST MOD 30 MIN: CPT

## 2021-04-26 RX ORDER — BLOOD-GLUCOSE SENSOR
EACH MISCELLANEOUS
Qty: 3 | Refills: 0 | Status: ACTIVE | COMMUNITY
Start: 2021-01-26

## 2021-05-05 NOTE — ED PROVIDER NOTE - PRINCIPAL DIAGNOSIS
PT eval   05/05/21 1015   PT Last Visit   PT Visit Date 05/05/21   Note Type   Note type Evaluation   Pain Assessment   Pain Assessment Tool 0-10   Pain Score 5   Pain Location/Orientation Location: Back;Orientation: Left;Orientation: Lower   Home Living   Type of [address] [place]   Prior Function   Level of Omega Independent with ADLs and functional mobility   Lives With Spouse   Receives Help From Family   ADL Assistance Independent   IADLs Independent   Falls in the last 6 months 0   Vocational Retired   Comments undergoing chemo  GI ca   Restrictions/Precautions   Wells Guy Bearing Precautions Per Order No   Other Precautions Pain   General   Additional Pertinent History pt adm to [facility] with back pain; alos anemia adn rec'd 2 units PRBCs     Family/Caregiver Present No   Cognition   Overall Cognitive Status WFL   Arousal/Participation Alert   Orientation Level Oriented X4   Memory Within functional limits   Following Commands Follows all commands and directions without difficulty   RUE Assessment   RUE Assessment WFL   LUE Assessment   LUE Assessment WFL   RLE Assessment   RLE Assessment WFL   LLE Assessment   LLE Assessment WFL   Proprioception   RLE Proprioception Grossly intact   LLE Proprioception Grossly Intact   Bed Mobility   Rolling L 5  Supervision   Additional items Verbal cues   Supine to Sit 5  Supervision   Additional items Verbal cues  (to logroll avoid twisting)   Transfers   Sit to Stand 5  Supervision  (requires RW)   Stand to Sit 5  Supervision   Stand pivot 6  Modified independent   Additional items   (rw)   Ambulation/Elevation   Gait pattern Forward Flexion  (cautious dieter)   Gait Assistance 6  Modified independent   Assistive Device Rolling walker   Distance 25'   Balance   Static Sitting Good   Dynamic Sitting Fair +   Static Standing Fair +   Dynamic Standing Fair +   Ambulatory Fair +   Endurance Deficit   Endurance Deficit Yes   Endurance Deficit Description tires quickly more back pain with standing   Activity Tolerance   Activity Tolerance Patient limited by pain   Medical Staff Made Aware OT Andreia   Nurse Made Aware CICI Burnette   Assessment   Prognosis Good   Problem List Pain   Assessment Pt able to mobilize with use of RW  Oob in chair and ice applied to L low back  Encouraged to gradually increase activity and avoid Bending lift twisting and use RW for bilat support  Pt agreeable and hoping for home d/c today   d c PT no further needs   Goals   Patient Goals pain relief   Plan   PT Frequency   (d/c PT)   Recommendation   PT Discharge Recommendation No rehabilitation needs   Equipment Recommended   (has own)   PT - OK to Discharge Yes   Elver Scherer 435   Turning in Bed Without Bedrails 4   Lying on Back to Sitting on Edge of Flat Bed 4   Moving Bed to Chair 4   Standing Up From Chair 4   Walk in Room 4   Climb 3-5 Stairs 3   Basic Mobility Inpatient Raw Score 23   Basic Mobility Standardized Score 50 88   Modified Denton Scale   Modified Denton Scale 2   Cordelia Huitron, PT Chest pain

## 2021-05-20 ENCOUNTER — RX RENEWAL (OUTPATIENT)
Age: 71
End: 2021-05-20

## 2021-07-06 ENCOUNTER — RX RENEWAL (OUTPATIENT)
Age: 71
End: 2021-07-06

## 2021-07-07 NOTE — DISCHARGE NOTE PROVIDER - NSDCFUSCHEDAPPT_GEN_ALL_CORE_FT
Final Anesthesia Post-op Assessment    Patient: Skyler Conteh  Procedure(s) Performed: ESOPHAGOGASTRODUODENOSCOPY  Anesthesia type: MAC    Vitals Value Taken Time   Temp 36.3 °C (97.3 °F) 07/07/21 1138   Pulse 76 07/07/21 1138   Resp 16 07/07/21 1138   SpO2 96 % 07/07/21 1138   /72 07/07/21 1138         Patient Location: PACU Phase 2  Post-op Vital Signs:stable  Level of Consciousness: awake, participates in exam, answers questions appropriately, alert, oriented and return to baseline  Respiratory Status: spontaneous ventilation, unassisted and room air  Cardiovascular blood pressure returned to baseline and stable  Hydration: euvolemic  Pain Management: well controlled  Handoff: Handoff to receiving nurse was performed and questions were answered  Vomiting: none  Nausea: None  Airway Patency:patent  Post-op Assessment: awake, alert, appropriately conversant, or baseline, no complications, patient tolerated procedure well with no complications, no evidence of recall, dentition within defined limits, moving all extremities and No Corneal Abrasion  Comments: Tolerated procedure well. Directly to Phase II recovery. Talking, feeling well. VSS. No apparent anesthetic complications. Report to RN. Appropriate for discharge to home.      No complications documented.    MADDIE CORDERO ; 11/12/2019 ; [facility] [phone]

## 2021-07-27 ENCOUNTER — APPOINTMENT (OUTPATIENT)
Dept: CARDIOLOGY | Facility: CLINIC | Age: 71
End: 2021-07-27

## 2021-08-05 ENCOUNTER — APPOINTMENT (OUTPATIENT)
Dept: CARDIOLOGY | Facility: CLINIC | Age: 71
End: 2021-08-05
Payer: MEDICARE

## 2021-08-05 VITALS
HEART RATE: 89 BPM | OXYGEN SATURATION: 98 % | HEIGHT: 68 IN | TEMPERATURE: 96.9 F | WEIGHT: 202 LBS | BODY MASS INDEX: 30.62 KG/M2 | SYSTOLIC BLOOD PRESSURE: 130 MMHG | DIASTOLIC BLOOD PRESSURE: 76 MMHG

## 2021-08-05 PROCEDURE — 99215 OFFICE O/P EST HI 40 MIN: CPT

## 2021-08-05 NOTE — REASON FOR VISIT
[Follow-Up - Clinic] : a clinic follow-up of [Coronary Artery Disease] : coronary artery disease [Hyperlipidemia] : hyperlipidemia [Hypertension] : hypertension [FreeTextEntry1] : I saw this 70-year-old man,in follow up on 08/05/21\par He  comes in today for routine f/u.  He is free of any angina. Cath. 4 months ago revealed minimal restenosis.\par He had radiation therapy to the left main and circumflex \par His history includes CAD,.  He had OM stenting, previously had RCA stenting, previously he had coronary artery bypass graft surgery LIMA to LAD; dyslipidemia, diabetes, obesity, and hypothyroidism.\par \par

## 2021-08-05 NOTE — HISTORY OF PRESENT ILLNESS
[FreeTextEntry1] : \par he has no chest pain\par He has no shortness of breath\par He has no palpitations\par He has no syncope\par He is neurologically intact\par He has no edema\par He has no GI symptoms\par \par

## 2021-08-05 NOTE — DISCUSSION/SUMMARY
[FreeTextEntry1] : I had increased the metoprolol to 100 mg daily. He will continue this dose\par He will be seen again in 4 months\par Maintain all other medications

## 2021-09-28 ENCOUNTER — APPOINTMENT (OUTPATIENT)
Dept: CARDIOLOGY | Facility: CLINIC | Age: 71
End: 2021-09-28
Payer: MEDICARE

## 2021-09-28 VITALS
BODY MASS INDEX: 30.01 KG/M2 | HEART RATE: 79 BPM | WEIGHT: 198 LBS | HEIGHT: 68 IN | SYSTOLIC BLOOD PRESSURE: 134 MMHG | OXYGEN SATURATION: 96 % | TEMPERATURE: 97.5 F | DIASTOLIC BLOOD PRESSURE: 84 MMHG

## 2021-09-28 PROCEDURE — 99214 OFFICE O/P EST MOD 30 MIN: CPT

## 2021-10-06 ENCOUNTER — NON-APPOINTMENT (OUTPATIENT)
Age: 71
End: 2021-10-06

## 2021-10-20 ENCOUNTER — RESULT REVIEW (OUTPATIENT)
Age: 71
End: 2021-10-20

## 2021-10-20 ENCOUNTER — OUTPATIENT (OUTPATIENT)
Dept: OUTPATIENT SERVICES | Facility: HOSPITAL | Age: 71
LOS: 1 days | End: 2021-10-20
Payer: MEDICARE

## 2021-10-20 DIAGNOSIS — Z95.1 PRESENCE OF AORTOCORONARY BYPASS GRAFT: Chronic | ICD-10-CM

## 2021-10-20 DIAGNOSIS — Z95.820 PERIPHERAL VASCULAR ANGIOPLASTY STATUS WITH IMPLANTS AND GRAFTS: Chronic | ICD-10-CM

## 2021-10-20 DIAGNOSIS — Z98.62 PERIPHERAL VASCULAR ANGIOPLASTY STATUS: Chronic | ICD-10-CM

## 2021-10-20 PROCEDURE — 74177 CT ABD & PELVIS W/CONTRAST: CPT | Mod: 26

## 2021-11-06 ENCOUNTER — INPATIENT (INPATIENT)
Facility: HOSPITAL | Age: 71
LOS: 1 days | Discharge: HOME | End: 2021-11-08
Attending: INTERNAL MEDICINE | Admitting: INTERNAL MEDICINE
Payer: MEDICARE

## 2021-11-06 DIAGNOSIS — Z95.1 PRESENCE OF AORTOCORONARY BYPASS GRAFT: Chronic | ICD-10-CM

## 2021-11-06 DIAGNOSIS — Z98.62 PERIPHERAL VASCULAR ANGIOPLASTY STATUS: Chronic | ICD-10-CM

## 2021-11-06 DIAGNOSIS — Z95.820 PERIPHERAL VASCULAR ANGIOPLASTY STATUS WITH IMPLANTS AND GRAFTS: Chronic | ICD-10-CM

## 2021-11-06 PROCEDURE — 93010 ELECTROCARDIOGRAM REPORT: CPT | Mod: 59,76

## 2021-11-06 PROCEDURE — 99291 CRITICAL CARE FIRST HOUR: CPT

## 2021-11-06 PROCEDURE — 71045 X-RAY EXAM CHEST 1 VIEW: CPT | Mod: 26

## 2021-11-06 PROCEDURE — 93010 ELECTROCARDIOGRAM REPORT: CPT | Mod: 77

## 2021-11-06 PROCEDURE — 74174 CTA ABD&PLVS W/CONTRAST: CPT | Mod: 26

## 2021-11-06 PROCEDURE — 71275 CT ANGIOGRAPHY CHEST: CPT | Mod: 26

## 2021-11-07 PROCEDURE — 92920 PRQ TRLUML C ANGIOP 1ART&/BR: CPT | Mod: LC

## 2021-11-07 PROCEDURE — 92941 PRQ TRLML REVSC TOT OCCL AMI: CPT | Mod: LM

## 2021-11-07 PROCEDURE — 99223 1ST HOSP IP/OBS HIGH 75: CPT

## 2021-11-07 PROCEDURE — 93010 ELECTROCARDIOGRAM REPORT: CPT

## 2021-11-07 PROCEDURE — 93010 ELECTROCARDIOGRAM REPORT: CPT | Mod: 77

## 2021-11-07 PROCEDURE — 93459 L HRT ART/GRFT ANGIO: CPT | Mod: 26,XU

## 2021-11-07 PROCEDURE — 93306 TTE W/DOPPLER COMPLETE: CPT | Mod: 26

## 2021-11-08 ENCOUNTER — OUTPATIENT (OUTPATIENT)
Dept: OUTPATIENT SERVICES | Facility: HOSPITAL | Age: 71
LOS: 1 days | End: 2021-11-08

## 2021-11-08 DIAGNOSIS — Z95.1 PRESENCE OF AORTOCORONARY BYPASS GRAFT: Chronic | ICD-10-CM

## 2021-11-08 DIAGNOSIS — Z98.62 PERIPHERAL VASCULAR ANGIOPLASTY STATUS: Chronic | ICD-10-CM

## 2021-11-08 DIAGNOSIS — Z95.820 PERIPHERAL VASCULAR ANGIOPLASTY STATUS WITH IMPLANTS AND GRAFTS: Chronic | ICD-10-CM

## 2021-11-11 ENCOUNTER — APPOINTMENT (OUTPATIENT)
Dept: CARDIOLOGY | Facility: CLINIC | Age: 71
End: 2021-11-11
Payer: MEDICARE

## 2021-11-11 ENCOUNTER — NON-APPOINTMENT (OUTPATIENT)
Age: 71
End: 2021-11-11

## 2021-11-11 VITALS
HEART RATE: 77 BPM | SYSTOLIC BLOOD PRESSURE: 120 MMHG | HEIGHT: 68 IN | BODY MASS INDEX: 29.55 KG/M2 | TEMPERATURE: 97 F | WEIGHT: 195 LBS | OXYGEN SATURATION: 98 % | DIASTOLIC BLOOD PRESSURE: 70 MMHG

## 2021-11-11 PROCEDURE — 93000 ELECTROCARDIOGRAM COMPLETE: CPT

## 2021-11-11 PROCEDURE — 99215 OFFICE O/P EST HI 40 MIN: CPT

## 2021-11-11 RX ORDER — METOPROLOL SUCCINATE 100 MG/1
100 TABLET, EXTENDED RELEASE ORAL DAILY
Qty: 90 | Refills: 3 | Status: DISCONTINUED | COMMUNITY
Start: 2020-08-17 | End: 2021-11-11

## 2021-11-11 RX ORDER — ISOSORBIDE MONONITRATE 60 MG/1
60 TABLET, EXTENDED RELEASE ORAL DAILY
Qty: 90 | Refills: 3 | Status: DISCONTINUED | COMMUNITY
Start: 2021-04-26 | End: 2021-11-11

## 2021-11-11 NOTE — REASON FOR VISIT
[Follow-Up - Clinic] : a clinic follow-up of [Coronary Artery Disease] : coronary artery disease [Hyperlipidemia] : hyperlipidemia [Hypertension] : hypertension [FreeTextEntry3] : Dr. Amico, Dr. De La Garza [FreeTextEntry1] : I saw this 70-year-old man,in follow up on 11/11/21\par 11/08 he presented again to the hospital with ongoing chest pain and NSTEMI and had urgent recath which showed severe restenosis in the left main, and was restented.  He had congestive heart failure immediately postprocedure which resolved with IV diuretics.  An echocardiogram done 48 hours later revealed normal LV function.  He is symptom-free and feeling well although has a lot of ecchymosis in his groin area.\par He had radiation therapy to the left main and circumflex  12/19\par His history includes CAD,.  He had OM stenting, previously had RCA stenting, previously he had coronary artery bypass graft surgery LIMA to LAD; dyslipidemia, diabetes, obesity, and hypothyroidism.\par \par

## 2021-11-11 NOTE — HISTORY OF PRESENT ILLNESS
[FreeTextEntry1] : \par he has no chest pain\par He has no shortness of breath\par He has no palpitations\par He has no syncope\par He is neurologically intact\par He has no edema\par He has no GI symptoms\par Groin very ecchymotic\par \par

## 2021-11-11 NOTE — DISCUSSION/SUMMARY
[FreeTextEntry1] : I had increased the metoprolol to 100 mg daily. He will continue this dose\par He will be seen again in 2 months to check the groin again\par Maintain all other medications

## 2021-11-16 ENCOUNTER — APPOINTMENT (OUTPATIENT)
Dept: NUCLEAR MEDICINE | Facility: CLINIC | Age: 71
End: 2021-11-16

## 2021-12-02 ENCOUNTER — APPOINTMENT (OUTPATIENT)
Dept: CARDIOLOGY | Facility: CLINIC | Age: 71
End: 2021-12-02
Payer: MEDICARE

## 2021-12-02 VITALS
BODY MASS INDEX: 29.7 KG/M2 | DIASTOLIC BLOOD PRESSURE: 74 MMHG | HEIGHT: 68 IN | SYSTOLIC BLOOD PRESSURE: 128 MMHG | HEART RATE: 77 BPM | TEMPERATURE: 97 F | WEIGHT: 196 LBS | OXYGEN SATURATION: 96 %

## 2021-12-02 PROCEDURE — 99215 OFFICE O/P EST HI 40 MIN: CPT

## 2021-12-02 NOTE — REASON FOR VISIT
[Follow-Up - Clinic] : a clinic follow-up of [Coronary Artery Disease] : coronary artery disease [Hyperlipidemia] : hyperlipidemia [Hypertension] : hypertension [FreeTextEntry3] : Dr. Schulte [FreeTextEntry1] : I saw this 71-year-old man,in follow up on 12/02/21\par 11/08 he presented again to the hospital with ongoing chest pain and NSTEMI and had urgent recath which showed severe restenosis in the left main, and was restented.  He had congestive heart failure immediately postprocedure which resolved with IV diuretics.  An echocardiogram done 48 hours later revealed normal LV function.  He is symptom-free and feeling well although has a lot of ecchymosis in his groin area.\par He came in today to recheck the groin which has completely cleared up with no evidence of any lumps bumps or bruising\par He had radiation therapy to the left main and circumflex  12/19\par His history includes CAD,.  He had OM stenting, previously had RCA stenting, previously he had coronary artery bypass graft surgery LIMA to LAD; dyslipidemia, diabetes, obesity, and hypothyroidism.\par \par

## 2021-12-02 NOTE — DISCUSSION/SUMMARY
[FreeTextEntry1] : I had increased the metoprolol to 100 mg daily. He will continue this dose\par He will be seen again in 6 months \par Maintain all  medications

## 2021-12-20 NOTE — ED ADULT TRIAGE NOTE - CCCP TRG CHIEF CMPLNT
Administrations This Visit     testosterone cypionate (DEPOTESTOTERONE CYPIONATE) injection 200 mg     Admin Date  12/20/2021  14:00 Action  Given Dose  200 mg Route  IntraMUSCular Site  Dorsogluteal Left Administered By  Iona Walters MA    Ordering Provider: CLARISSA Robertson    Patient Supplied?: Yes    Admin Date  12/20/2021  14:00 Action  Given Dose  200 mg Route  IntraMUSCular Site  Dorsogluteal Left Administered By  Iona Walters MA    Ordering Provider: CLARISSA Robertson    Patient Supplied?: Yes              Patient tolerated injection well. Patient advised to wait 20 minutes in the office following the injection. No signs/symptoms of reaction noted after 20 minutes.
chest pain

## 2022-01-01 NOTE — REASON FOR VISIT
You can access the FollowMyHealth Patient Portal offered by [facility] by registering at the following website: [email or web address]. By joining [facility]’s FollowMyHealth portal, you will also be able to view your health information using other applications (apps) compatible with our system. [Follow-Up - Clinic] : a clinic follow-up of [Coronary Artery Disease] : coronary artery disease [Hyperlipidemia] : hyperlipidemia [Hypertension] : hypertension [FreeTextEntry1] : I saw this 68-year-old man,in follow up on 10/03/19\par In July  he presented to the hospital with unstable angina, and had a stent to the ostial LCX. He is now pain free.\par \par He was seen on 6/3/19 for significant chest pain.  The patient stated that if he walks more than usual, he gets tightness all across the chest which gets better with resting.  Also after large meal, walking is very difficult due to the symptoms.  The symptoms are now very much disabled.  He does not get any chest pain in the resting condition.  He does not get short of breath in the resting condition, but does get shortness of breath on more than usual exertion.  He denies PND, orthopnea, diaphoresis, dizziness, palpitations, or pedal edema. \par \par He was started on Ranexa and he has significant improvement in his chronic stable angina; he can do all his activity without any CP.\par \par \par His history includes CAD, last cardiac catheterization on March 11, 2019.  He had OM stenting, previously had RCA stenting, previously he had coronary artery bypass graft surgery LIMA to LAD; dyslipidemia, diabetes, obesity, and hypothyroidism.\par

## 2022-01-21 NOTE — H&P ADULT - NSHPPHYSICALEXAM_GEN_ALL_CORE
Vital Signs Last 24 Hrs  T(C): 36.6 (14 Oct 2019 22:48), Max: 36.6 (14 Oct 2019 22:48)  T(F): 97.8 (14 Oct 2019 22:48), Max: 97.8 (14 Oct 2019 22:48)  HR: 103 (15 Oct 2019 00:30) (98 - 110)  BP: 155/76 (15 Oct 2019 00:30) (129/78 - 159/78)  RR: 20 (15 Oct 2019 00:30) (17 - 20)  SpO2: 96% (15 Oct 2019 00:30) (96% - 100%) on room air    GENERAL: NAD, well-developed  HEAD:  Atraumatic, Normocephalic  EYES: EOMI, PERRLA, conjunctiva and sclera clear  Mouth: MMM, no lesions  NECK: Supple, no appreciable masses  Lung: normal work of breathing, cta b/l  Chest: S1&S2+, rrr, no m/r/g appreciated  ABDOMEN: bs+, soft, nt, nd, no appreciable masses  : No rockwell catheter, no CVA tenderness  EXTREMITIES:  radial pulse present b/l, PT present b/l, no pitting edema present  Neuro: Alert and appropriate to conversation, no paralysis in extremities   SKIN: warm and dry, no visible rashes
L shoulder, ribs, and knee

## 2022-03-04 ENCOUNTER — APPOINTMENT (OUTPATIENT)
Dept: CARDIOLOGY | Facility: CLINIC | Age: 72
End: 2022-03-04
Payer: MEDICARE

## 2022-03-04 VITALS — DIASTOLIC BLOOD PRESSURE: 74 MMHG | HEART RATE: 80 BPM | SYSTOLIC BLOOD PRESSURE: 132 MMHG | RESPIRATION RATE: 16 BRPM

## 2022-03-04 PROCEDURE — 99215 OFFICE O/P EST HI 40 MIN: CPT

## 2022-03-04 PROCEDURE — 93000 ELECTROCARDIOGRAM COMPLETE: CPT

## 2022-03-04 NOTE — REASON FOR VISIT
[Follow-Up - Clinic] : a clinic follow-up of [Coronary Artery Disease] : coronary artery disease [Hyperlipidemia] : hyperlipidemia [Hypertension] : hypertension [FreeTextEntry3] : Dr. Schulte [FreeTextEntry1] : I saw this 71-year-old man,in follow up on 03/04/22\par 11/08 he presented again to the hospital with ongoing chest pain and NSTEMI and had urgent recath which showed severe restenosis in the left main, and was restented.  He had congestive heart failure immediately postprocedure which resolved with IV diuretics.  An echocardiogram done 48 hours later revealed normal LV function.  He was symptom-free and feeling well but is now having occasional exertional chest pain.\par He had radiation therapy to the left main and circumflex  12/19\par His history includes CAD,.  He had OM stenting, previously had RCA stenting, previously he had coronary artery bypass graft surgery LIMA to LAD; dyslipidemia, diabetes, obesity, and hypothyroidism.\par \par

## 2022-03-04 NOTE — DISCUSSION/SUMMARY
[FreeTextEntry1] : I had increased the metoprolol to 100 mg daily. He will continue this dose\par He will be seen again in 3 months \par Maintain all  medications

## 2022-03-04 NOTE — HISTORY OF PRESENT ILLNESS
[FreeTextEntry1] : \par he has rare chest pain\par He has no shortness of breath\par He has no palpitations\par He has no syncope\par He is neurologically intact\par He has no edema\par He has no GI symptoms\par Groin very ecchymotic\par \par

## 2022-03-18 ENCOUNTER — OUTPATIENT (OUTPATIENT)
Dept: OUTPATIENT SERVICES | Facility: HOSPITAL | Age: 72
LOS: 1 days | End: 2022-03-18

## 2022-03-18 DIAGNOSIS — E78.2 MIXED HYPERLIPIDEMIA: ICD-10-CM

## 2022-03-18 DIAGNOSIS — E11.59 TYPE 2 DIABETES MELLITUS WITH OTHER CIRCULATORY COMPLICATIONS: ICD-10-CM

## 2022-03-18 DIAGNOSIS — Z98.62 PERIPHERAL VASCULAR ANGIOPLASTY STATUS: Chronic | ICD-10-CM

## 2022-03-18 DIAGNOSIS — Z95.1 PRESENCE OF AORTOCORONARY BYPASS GRAFT: Chronic | ICD-10-CM

## 2022-03-18 DIAGNOSIS — Z95.820 PERIPHERAL VASCULAR ANGIOPLASTY STATUS WITH IMPLANTS AND GRAFTS: Chronic | ICD-10-CM

## 2022-03-18 DIAGNOSIS — I10 ESSENTIAL (PRIMARY) HYPERTENSION: ICD-10-CM

## 2022-05-10 NOTE — DISCHARGE NOTE NURSING/CASE MANAGEMENT/SOCIAL WORK - NSDCVIVACCINE_GEN_ALL_CORE_FT
No Vaccines Administered. CHIDI RUBIO  84y  Male      Patient is a 84y old  Male who presents with a chief complaint of NSTEMI, Hyperglycemia, SANDRA, Encephalopathy (10 May 2022 17:13)  comfortable,nad.no active GI Bleeding    REVIEW OF SYSTEMS:  CONSTITUTIONAL: No fever  RESPIRATORY: No cough, hemoptysis or shortness of breath  CARDIOVASCULAR: No chest pain, palpitations, dizziness, or leg swelling  GASTROINTESTINAL: No abdominal pain. nausea, vomiting, hematemesis  INTERVAL HPI/OVERNIGHT EVENTS:  T(C): 36.9 (05-10-22 @ 11:34), Max: 36.9 (05-10-22 @ 11:34)  HR: 90 (05-10-22 @ 17:33) (70 - 90)  BP: 121/72 (05-10-22 @ 17:33) (114/73 - 130/75)  RR: 17 (05-10-22 @ 11:34) (17 - 18)  SpO2: 100% (05-10-22 @ 11:34) (100% - 100%)  Wt(kg): --  I&O's Summary    09 May 2022 07:01  -  10 May 2022 07:00  --------------------------------------------------------  IN: 0 mL / OUT: 1100 mL / NET: -1100 mL    10 May 2022 07:01  -  10 May 2022 23:45  --------------------------------------------------------  IN: 0 mL / OUT: 725 mL / NET: -725 mL      T(C): 36.9 (05-10-22 @ 11:34), Max: 36.9 (05-10-22 @ 11:34)  HR: 90 (05-10-22 @ 17:33) (70 - 90)  BP: 121/72 (05-10-22 @ 17:33) (114/73 - 130/75)  RR: 17 (05-10-22 @ 11:34) (17 - 18)  SpO2: 100% (05-10-22 @ 11:34) (100% - 100%)  Wt(kg): --Vital Signs Last 24 Hrs  T(C): 36.9 (10 May 2022 11:34), Max: 36.9 (10 May 2022 11:34)  T(F): 98.5 (10 May 2022 11:34), Max: 98.5 (10 May 2022 11:34)  HR: 90 (10 May 2022 17:33) (70 - 90)  BP: 121/72 (10 May 2022 17:33) (114/73 - 130/75)  BP(mean): --  RR: 17 (10 May 2022 11:34) (17 - 18)  SpO2: 100% (10 May 2022 11:34) (100% - 100%)    LABS:                        11.9   5.79  )-----------( 346      ( 10 May 2022 07:09 )             36.9     05-10    144  |  109<H>  |  9   ----------------------------<  139<H>  4.0   |  26  |  1.02    Ca    8.9      10 May 2022 07:09  Phos  2.7     05-10  Mg     1.90     05-10      PTT - ( 09 May 2022 22:39 )  PTT:27.6 sec    CAPILLARY BLOOD GLUCOSE      POCT Blood Glucose.: 162 mg/dL (10 May 2022 20:40)  POCT Blood Glucose.: 164 mg/dL (10 May 2022 16:45)  POCT Blood Glucose.: 187 mg/dL (10 May 2022 11:07)  POCT Blood Glucose.: 141 mg/dL (10 May 2022 07:42)            PAST MEDICAL & SURGICAL HISTORY:  Diabetes mellitus      CAD (coronary artery disease)      Prostate ca  s/p SEED 5 yrs ago      Sleep apnea  on cpap at home      Hypertension      Hypercholesteremia      S/P cholecystectomy      S/P coronary angioplasty  last stent 1 year ago      H/O carotid endarterectomy  April 2022, stent placed.          MEDICATIONS  (STANDING):  apixaban 5 milliGRAM(s) Oral two times a day  atorvastatin 40 milliGRAM(s) Oral at bedtime  dextrose 5%. 1000 milliLiter(s) (100 mL/Hr) IV Continuous <Continuous>  dextrose 5%. 1000 milliLiter(s) (50 mL/Hr) IV Continuous <Continuous>  dextrose 50% Injectable 25 Gram(s) IV Push once  dextrose 50% Injectable 12.5 Gram(s) IV Push once  dextrose 50% Injectable 25 Gram(s) IV Push once  digoxin     Tablet 125 MICROGram(s) Oral daily  doxazosin 1 milliGRAM(s) Oral at bedtime  glucagon  Injectable 1 milliGRAM(s) IntraMuscular once  hydrocortisone hemorrhoidal Suppository 1 Suppository(s) Rectal at bedtime  insulin glargine Injectable (LANTUS) 16 Unit(s) SubCutaneous at bedtime  insulin lispro (ADMELOG) corrective regimen sliding scale   SubCutaneous three times a day before meals  insulin lispro (ADMELOG) corrective regimen sliding scale   SubCutaneous at bedtime  insulin lispro Injectable (ADMELOG) 5 Unit(s) SubCutaneous three times a day before meals  metoprolol tartrate 50 milliGRAM(s) Oral two times a day  pantoprazole    Tablet 40 milliGRAM(s) Oral before breakfast  polyethylene glycol 3350 17 Gram(s) Oral daily  senna 2 Tablet(s) Oral at bedtime  sodium chloride 0.9% lock flush 3 milliLiter(s) IV Push every 8 hours    MEDICATIONS  (PRN):  dextrose Oral Gel 15 Gram(s) Oral once PRN Blood Glucose LESS THAN 70 milliGRAM(s)/deciliter        RADIOLOGY & ADDITIONAL TESTS:    Imaging Personally Reviewed:  [ ] YES  [ ] NO    Consultant(s) Notes Reviewed:  [ ] YES  [ ] NO    PHYSICAL EXAM:  GENERAL: Alert and awake lying in bed in no distress  HEAD:  Atraumatic, Normocephalic  EYES: EOMI, [place], conjunctiva and sclera clear  NECK: Supple, No JVD, Normal thyroid  NERVOUS SYSTEM:  Alert & Oriented X3, Motor and sensory systems are intact,   CHEST/LUNG: Bilateral clear breath sounds, no rhochi, no wheezing, no crepitations,  HEART: Regular rate and rhythm; No murmurs, rubs, or gallops  ABDOMEN: Soft, Nontender, Nondistended; Bowel sounds present  EXTREMITIES:   Peripheral Pulses are palpable, no  edema  s/p parnell      Care Discussed with Consultants/Other Providers [x ] YES  [ ] NO      Code Status: [] Full Code [] DNR [] DNI [] Goals of Care:   Disposition: [] ICU [] Stroke Unit [] RCU []PCU []Floor [] Discharge Home         BOLA CaseyP

## 2022-05-27 ENCOUNTER — APPOINTMENT (OUTPATIENT)
Dept: CARDIOLOGY | Facility: CLINIC | Age: 72
End: 2022-05-27
Payer: MEDICARE

## 2022-05-27 VITALS
WEIGHT: 196 LBS | TEMPERATURE: 97 F | OXYGEN SATURATION: 99 % | SYSTOLIC BLOOD PRESSURE: 132 MMHG | HEART RATE: 85 BPM | HEIGHT: 68 IN | BODY MASS INDEX: 29.7 KG/M2 | DIASTOLIC BLOOD PRESSURE: 74 MMHG

## 2022-05-27 PROCEDURE — 99215 OFFICE O/P EST HI 40 MIN: CPT

## 2022-05-27 NOTE — HISTORY OF PRESENT ILLNESS
[FreeTextEntry1] : \par he has increasing chest pain\par He has no shortness of breath\par He has no palpitations\par He has no syncope\par He is neurologically intact\par He has no edema\par He has no GI symptoms\par Groin very ecchymotic\par \par

## 2022-05-27 NOTE — DISCUSSION/SUMMARY
[FreeTextEntry1] : I had increased the metoprolol to 100 mg daily. He will continue this dose\par He is scheduled for repeat cath.\par Maintain all  medications

## 2022-05-27 NOTE — REASON FOR VISIT
[Follow-Up - Clinic] : a clinic follow-up of [Coronary Artery Disease] : coronary artery disease [Hyperlipidemia] : hyperlipidemia [Hypertension] : hypertension [FreeTextEntry3] : Dr. Schulte [FreeTextEntry1] : I saw this 71-year-old man,in follow up on 05/27/22\par 11/08 he presented again to the hospital with ongoing chest pain and NSTEMI and had urgent recath which showed severe restenosis in the left main, and was restented.  He had congestive heart failure immediately postprocedure which resolved with IV diuretics.  An echocardiogram done 48 hours later revealed normal LV function.  He was symptom-free and feeling well but is now having occasional exertional chest pain.\par He had radiation therapy to the left main and circumflex  12/19\par His history includes CAD,.  He had OM stenting, previously had RCA stenting, previously he had coronary artery bypass graft surgery LIMA to LAD; dyslipidemia, diabetes, obesity, and hypothyroidism.\par He comes in today for follow-up because of increased  exertional anginal symptoms.  It certainly sounds like he has restenosis once again.\par \par

## 2022-06-04 ENCOUNTER — LABORATORY RESULT (OUTPATIENT)
Age: 72
End: 2022-06-04

## 2022-06-08 ENCOUNTER — INPATIENT (INPATIENT)
Facility: HOSPITAL | Age: 72
LOS: 0 days | Discharge: HOME | End: 2022-06-09
Attending: INTERNAL MEDICINE | Admitting: INTERNAL MEDICINE
Payer: MEDICARE

## 2022-06-08 DIAGNOSIS — Z98.62 PERIPHERAL VASCULAR ANGIOPLASTY STATUS: Chronic | ICD-10-CM

## 2022-06-08 DIAGNOSIS — Z95.820 PERIPHERAL VASCULAR ANGIOPLASTY STATUS WITH IMPLANTS AND GRAFTS: Chronic | ICD-10-CM

## 2022-06-08 DIAGNOSIS — Z95.1 PRESENCE OF AORTOCORONARY BYPASS GRAFT: Chronic | ICD-10-CM

## 2022-06-08 PROCEDURE — 93010 ELECTROCARDIOGRAM REPORT: CPT

## 2022-06-08 PROCEDURE — 93454 CORONARY ARTERY ANGIO S&I: CPT | Mod: 26,XU

## 2022-06-08 PROCEDURE — 92921: CPT | Mod: LC

## 2022-06-08 PROCEDURE — 92928 PRQ TCAT PLMT NTRAC ST 1 LES: CPT | Mod: LC

## 2022-06-08 PROCEDURE — 93308 TTE F-UP OR LMTD: CPT | Mod: 26

## 2022-06-09 PROCEDURE — 93010 ELECTROCARDIOGRAM REPORT: CPT

## 2022-06-10 ENCOUNTER — APPOINTMENT (OUTPATIENT)
Dept: CARDIOLOGY | Facility: CLINIC | Age: 72
End: 2022-06-10

## 2022-06-14 ENCOUNTER — APPOINTMENT (OUTPATIENT)
Dept: CARDIOLOGY | Facility: CLINIC | Age: 72
End: 2022-06-14
Payer: MEDICARE

## 2022-06-14 ENCOUNTER — NON-APPOINTMENT (OUTPATIENT)
Age: 72
End: 2022-06-14

## 2022-06-14 VITALS
DIASTOLIC BLOOD PRESSURE: 76 MMHG | HEIGHT: 68 IN | BODY MASS INDEX: 29.55 KG/M2 | TEMPERATURE: 97.3 F | HEART RATE: 87 BPM | SYSTOLIC BLOOD PRESSURE: 134 MMHG | OXYGEN SATURATION: 97 % | WEIGHT: 195 LBS

## 2022-06-14 PROCEDURE — 93000 ELECTROCARDIOGRAM COMPLETE: CPT

## 2022-06-14 PROCEDURE — 99215 OFFICE O/P EST HI 40 MIN: CPT

## 2022-06-14 RX ORDER — METOPROLOL SUCCINATE 50 MG/1
50 TABLET, EXTENDED RELEASE ORAL DAILY
Qty: 90 | Refills: 3 | Status: DISCONTINUED | COMMUNITY
End: 2022-06-14

## 2022-06-14 NOTE — HISTORY OF PRESENT ILLNESS
[FreeTextEntry1] : \par he has no chest pain\par He has no shortness of breath\par He has no palpitations\par He has no syncope\par He is neurologically intact\par He has no edema\par He has no GI symptoms\par Groin mildly ecchymotic\par \par

## 2022-06-14 NOTE — REASON FOR VISIT
[Follow-Up - Clinic] : a clinic follow-up of [Coronary Artery Disease] : coronary artery disease [Hyperlipidemia] : hyperlipidemia [Hypertension] : hypertension [FreeTextEntry3] : Dr. Schulte [FreeTextEntry1] : I saw this 71-year-old man,in follow up on 06/14/22\par On 06/08/22 he underwent recath and had a discrete tight restenosis at the ostium of the OM1, which was restented.  He is feeling exceptionally well with no symptoms, has no access site issues, and is functioning at 100%.\par 11/08 he presented again to the hospital with ongoing chest pain and NSTEMI and had urgent recath which showed severe restenosis in the left main, and was restented.  He had congestive heart failure immediately postprocedure which resolved with IV diuretics.  An echocardiogram done 48 hours later revealed normal LV function.  He was symptom-free and feeling well but is now having occasional exertional chest pain.\par He had radiation therapy to the left main and circumflex  12/19\par His history includes CAD,.  He had OM stenting, previously had RCA stenting, previously he had coronary artery bypass graft surgery LIMA to LAD; dyslipidemia, diabetes, obesity, and hypothyroidism.\par \par \par

## 2022-06-16 DIAGNOSIS — Z95.5 PRESENCE OF CORONARY ANGIOPLASTY IMPLANT AND GRAFT: ICD-10-CM

## 2022-06-16 DIAGNOSIS — Z95.1 PRESENCE OF AORTOCORONARY BYPASS GRAFT: ICD-10-CM

## 2022-06-16 DIAGNOSIS — Z79.02 LONG TERM (CURRENT) USE OF ANTITHROMBOTICS/ANTIPLATELETS: ICD-10-CM

## 2022-06-16 DIAGNOSIS — E66.9 OBESITY, UNSPECIFIED: ICD-10-CM

## 2022-06-16 DIAGNOSIS — Z79.84 LONG TERM (CURRENT) USE OF ORAL HYPOGLYCEMIC DRUGS: ICD-10-CM

## 2022-06-16 DIAGNOSIS — I25.110 ATHEROSCLEROTIC HEART DISEASE OF NATIVE CORONARY ARTERY WITH UNSTABLE ANGINA PECTORIS: ICD-10-CM

## 2022-06-16 DIAGNOSIS — Z80.9 FAMILY HISTORY OF MALIGNANT NEOPLASM, UNSPECIFIED: ICD-10-CM

## 2022-06-16 DIAGNOSIS — Z82.49 FAMILY HISTORY OF ISCHEMIC HEART DISEASE AND OTHER DISEASES OF THE CIRCULATORY SYSTEM: ICD-10-CM

## 2022-06-16 DIAGNOSIS — I50.30 UNSPECIFIED DIASTOLIC (CONGESTIVE) HEART FAILURE: ICD-10-CM

## 2022-06-16 DIAGNOSIS — I11.0 HYPERTENSIVE HEART DISEASE WITH HEART FAILURE: ICD-10-CM

## 2022-06-16 DIAGNOSIS — E78.5 HYPERLIPIDEMIA, UNSPECIFIED: ICD-10-CM

## 2022-06-16 DIAGNOSIS — E11.9 TYPE 2 DIABETES MELLITUS WITHOUT COMPLICATIONS: ICD-10-CM

## 2022-06-16 DIAGNOSIS — E03.9 HYPOTHYROIDISM, UNSPECIFIED: ICD-10-CM

## 2022-06-16 DIAGNOSIS — R07.9 CHEST PAIN, UNSPECIFIED: ICD-10-CM

## 2022-06-16 DIAGNOSIS — Z79.82 LONG TERM (CURRENT) USE OF ASPIRIN: ICD-10-CM

## 2022-06-16 DIAGNOSIS — I25.2 OLD MYOCARDIAL INFARCTION: ICD-10-CM

## 2022-06-16 DIAGNOSIS — Z20.822 CONTACT WITH AND (SUSPECTED) EXPOSURE TO COVID-19: ICD-10-CM

## 2022-06-20 ENCOUNTER — TRANSCRIPTION ENCOUNTER (OUTPATIENT)
Age: 72
End: 2022-06-20

## 2022-06-22 ENCOUNTER — TRANSCRIPTION ENCOUNTER (OUTPATIENT)
Age: 72
End: 2022-06-22

## 2022-07-17 ENCOUNTER — RX RENEWAL (OUTPATIENT)
Age: 72
End: 2022-07-17

## 2022-07-26 ENCOUNTER — APPOINTMENT (OUTPATIENT)
Dept: ULTRASOUND IMAGING | Facility: CLINIC | Age: 72
End: 2022-07-26

## 2022-07-26 PROCEDURE — 76775 US EXAM ABDO BACK WALL LIM: CPT

## 2022-09-13 NOTE — PATIENT PROFILE ADULT - NSPROPTRIGHTREPNAME_GEN_A__NUR
Chief Complaint   Patient presents with   • Follow-up     Device check, St cristi single icd 11910         10/21/21 Single ICD->[employer] Model #[id number], SN: [id number] ([facility])    VISIT NOTE REPORT  Raudel Atkinson MD  Chief Complaint   Patient presents with   • Follow-up     Device check, St cristi single icd 42760       HISTORY OF PRESENT ILLNESS:  Af[place] Porter 56 year old female with hypertrophic cardiomyopathy undergoing a septal myectomy 2014, an SVT, moderate-severe mitral valve regurgitation..  Patient is followed by Dr. De La Torre.  She is status post [employer] single lead ICD placement on October 21, 2021.  Patient states overall she has been feeling pretty good.  She denies any lightheadedness, dizziness, shortness of breath, chest pain or heart palpitations.  She feels that she is doing good from a rhythm standpoint.  She has no new concerns at this visit.    REVIEW OF SYSTEMS:  A 12 point review of systems was performed and found to be negative except for what was stated in the above HPI.      VITALS:  Visit Vitals  /68 (BP Location: LUE - Left upper extremity, Patient Position: Sitting, Cuff Size: Regular)   Pulse 65   Ht 5' 2\" (1.575 m)   Wt 72.6 kg (160 lb)   LMP 11/18/2014   BMI 29.26 kg/m²       Physical Exam  Constitutional:       General: She is not in acute distress.     Appearance: Normal appearance. She is normal weight. She is not ill-appearing.   Cardiovascular:      Rate and Rhythm: Normal rate and regular rhythm.      Pulses: Normal pulses.      Heart sounds: Murmur heard.     No friction rub. No gallop.   Pulmonary:      Effort: No respiratory distress.      Breath sounds: Normal breath sounds. No wheezing, rhonchi or rales.   Abdominal:      General: There is no distension.      Palpations: Abdomen is soft.      Tenderness: There is no abdominal tenderness.   Musculoskeletal:         General: Normal range of motion.      Cervical back: Normal range of motion.      Right lower leg: No  edema.      Left lower leg: No edema.   Skin:     General: Skin is warm and dry.   Neurological:      General: No focal deficit present.      Mental Status: She is alert and oriented to person, place, and time. Mental status is at baseline.      Cranial Nerves: No cranial nerve deficit.      Sensory: No sensory deficit.   Psychiatric:         Mood and Affect: Mood normal.         Behavior: Behavior normal.         Thought Content: Thought content normal.         Judgment: Judgment normal.         Potassium (mmol/L)   Date Value   09/06/2022 4.6     Creatinine (mg/dL)   Date Value   09/06/2022 0.68       MEDICATIONS:  Current Outpatient Medications   Medication Sig Dispense Refill   • Coenzyme Q10 100 MG Cap Take 100 mg by mouth daily.     • spironolactone (ALDACTONE) 25 MG tablet Take 1 tablet by mouth daily. 90 tablet 3   • losartan (COZAAR) 25 MG tablet Take 1 tablet by mouth daily. 90 tablet 3   • buPROPion XL (WELLBUTRIN XL) 150 MG 24 hr tablet 150 mg daily.     • escitalopram (LEXAPRO) 20 MG tablet Take 1 tablet by mouth daily.     • omega-3 acid ethyl esters (LOVAZA) 1 g capsule Take 1 g by mouth daily.     • acetaminophen (TYLENOL) 500 MG tablet Take 500 mg by mouth every 6 hours as needed for Pain or Fever.     • Glycerin-Hypromellose- (VISINE DRY EYE OP) Place 1 drop into both eyes 2 times daily.     • Azelaic Acid (FINACEA EX) Apply topically daily as needed (rosacia).     • rosuvastatin (CRESTOR) 20 MG tablet TAKE ONE TABLET BY MOUTH DAILY 30 tablet 11   • atenolol (TENORMIN) 25 MG tablet Take 1 tablet by mouth 2 times daily. 60 tablet 10   • amoxicillin (AMOXIL) 500 MG capsule TAKE 4 CAPSULES BY MOUTH 1 HOUR BEFORE DENTAL CLEANINGS 16 capsule 0   • levothyroxine (SYNTHROID, LEVOTHROID) 50 MCG tablet Take 1 tablet by mouth daily. 90 tablet 1   • vitamin - therapeutic multivitamins w/minerals (CENTRUM SILVER,THERA-M) TABS Take 1 tablet by mouth daily. 30 tablet      No current facility-administered  medications for this visit.       I have reviewed the patient's allergies, past medical, surgical, social and family history, updating these as appropriate.  See Histories section of the EMR (electronic medical record) for a display of this information.      Echocardiogram 08/31/2022  Final Impressions  · Hypertrophic Cardiomyopathy with mid ventricular obstruction.  · S/P mid ventricular septal resection (10/2013; Dr. Mejía).  · Excellent result. No residual mid ventricular obstruction at rest or with Valsalva.  · Julius-lateral wall (thickness 24 mm) and mid-ventricular hypertrophy variant.  · Thickened MV leaflets, Mal coaptation of anterior leaflet (A3) at the lateral commissural region..  · Moderate MR (eccentric posterior directed jet), RV = 48 ml..  · Normal LV cavity size and systolic function, EF = 62 %. GLS = - 20 %. CI = 2.8 L..  · Grade II diastolic dysfunction, moderately elevated filling pressures. Kimberley = 40 ml/m2..  · Normal RV cavity size and systolic function, PASP = 36 mmHg.  · No pericardial effusion.    DEVICE INTERROGATION:  • [employer] single lead ICD pacemaker  • Estimated battery life 9.6 years  • Device functioning normally  • Normal functioning lead with stable impedance, threshold and sensitivities  •  less than 1%  • No observations noted    IMPRESSION / PLAN:  Hypertrophic cardiomyopathy  • Status post mid septal myectomy 2013  • Limited echo 08/31/2022 notes normal improved EF 61%  • Follows Dr. Pennington    NSVT:  • No episodes noted on device interrogation    [employer]i single-chamber ICD pacemaker:  • Normal ICD function lead function.  • The thresholds, sensitivity and impedance stable    Moderate to severe mitral regurgitation:  • Noted on recent echo  • The patient follows Dr. Pennington    PLAN:  · continue current therapy  • Follow up in 6 months  • Advised to exercise as tolerated. Warm up prior to working out and cool down after.      MIKEL Up  Electrophysiology     [place]

## 2022-10-04 ENCOUNTER — APPOINTMENT (OUTPATIENT)
Dept: CARDIOLOGY | Facility: CLINIC | Age: 72
End: 2022-10-04

## 2022-10-04 ENCOUNTER — NON-APPOINTMENT (OUTPATIENT)
Age: 72
End: 2022-10-04

## 2022-10-04 VITALS
HEART RATE: 94 BPM | OXYGEN SATURATION: 96 % | TEMPERATURE: 97.3 F | HEIGHT: 68 IN | DIASTOLIC BLOOD PRESSURE: 82 MMHG | SYSTOLIC BLOOD PRESSURE: 134 MMHG | WEIGHT: 204 LBS | BODY MASS INDEX: 30.92 KG/M2

## 2022-10-04 PROCEDURE — 93000 ELECTROCARDIOGRAM COMPLETE: CPT

## 2022-10-04 PROCEDURE — 99215 OFFICE O/P EST HI 40 MIN: CPT

## 2022-10-04 RX ORDER — DEXLANSOPRAZOLE 60 MG/1
60 CAPSULE, DELAYED RELEASE ORAL
Refills: 0 | Status: DISCONTINUED | COMMUNITY
End: 2022-10-04

## 2022-10-04 NOTE — HISTORY OF PRESENT ILLNESS
[FreeTextEntry1] : \par he has rare exertional  chest pain\par He has no shortness of breath\par He has no palpitations\par He has no syncope\par He is neurologically intact\par He has no edema\par He has no GI symptoms\par Groin mildly ecchymotic\par \par

## 2022-10-04 NOTE — REASON FOR VISIT
[Follow-Up - Clinic] : a clinic follow-up of [Coronary Artery Disease] : coronary artery disease [Hyperlipidemia] : hyperlipidemia [Hypertension] : hypertension [FreeTextEntry3] : Dr. Schulte [FreeTextEntry1] : I saw this 71-year-old man,in follow up on 10/04/22\par On 06/08/22 he underwent recath and had a discrete tight restenosis at the ostium of the OM1, which was restented.  He is feeling exceptionally well with no symptoms, has no access site issues, and is functioning at 100%.\par 11/08 he presented again to the hospital with ongoing chest pain and NSTEMI and had urgent recath which showed severe restenosis in the left main, and was restented.  He had congestive heart failure immediately postprocedure which resolved with IV diuretics.  An echocardiogram done 48 hours later revealed normal LV function.  He was symptom-free and feeling well but is now having occasional exertional chest pain.\par He had radiation therapy to the left main and circumflex  12/19\par His history includes CAD,.  He had OM stenting, previously had RCA stenting, previously he had coronary artery bypass graft surgery LIMA to LAD; dyslipidemia, diabetes, obesity, and hypothyroidism.\par He has put on 10 pounds in weight, and had rare infrequent exertional angina.\par \par \par

## 2022-10-04 NOTE — DISCUSSION/SUMMARY
[FreeTextEntry1] : Increase  the metoprolol to 75 mg daily.\par Maintain all  medications\par f/u in 1 month

## 2022-10-18 ENCOUNTER — APPOINTMENT (OUTPATIENT)
Dept: CARDIOLOGY | Facility: CLINIC | Age: 72
End: 2022-10-18

## 2022-10-18 VITALS
WEIGHT: 204 LBS | TEMPERATURE: 96.9 F | BODY MASS INDEX: 30.92 KG/M2 | OXYGEN SATURATION: 97 % | HEART RATE: 81 BPM | SYSTOLIC BLOOD PRESSURE: 148 MMHG | HEIGHT: 68 IN | DIASTOLIC BLOOD PRESSURE: 74 MMHG

## 2022-10-18 PROCEDURE — 99215 OFFICE O/P EST HI 40 MIN: CPT

## 2022-10-18 RX ORDER — CICLOPIROX OLAMINE 7.7 MG/G
0.77 CREAM TOPICAL
Qty: 90 | Refills: 0 | Status: ACTIVE | COMMUNITY
Start: 2022-06-04

## 2022-10-18 RX ORDER — RANOLAZINE 500 MG/1
500 TABLET, EXTENDED RELEASE ORAL
Qty: 180 | Refills: 3 | Status: DISCONTINUED | COMMUNITY
Start: 2019-06-03 | End: 2022-10-18

## 2022-10-18 NOTE — REASON FOR VISIT
[Follow-Up - Clinic] : a clinic follow-up of [Coronary Artery Disease] : coronary artery disease [Hyperlipidemia] : hyperlipidemia [Hypertension] : hypertension [FreeTextEntry3] : Dr. Schulte [FreeTextEntry1] : I saw this 71-year-old man,in follow up on 10/18/22\par On 06/08/22 he underwent recath and had a discrete tight restenosis at the ostium of the OM1, which was restented.  He is feeling exceptionally well with no symptoms, has no access site issues, and is functioning at 100%.\par 11/08 he presented again to the hospital with ongoing chest pain and NSTEMI and had urgent recath which showed severe restenosis in the left main, and was restented.  He had congestive heart failure immediately postprocedure which resolved with IV diuretics.  An echocardiogram done 48 hours later revealed normal LV function.  He was symptom-free and feeling well but is now having occasional exertional chest pain.\par He had radiation therapy to the left main and circumflex  12/19\par His history includes CAD,.  He had OM stenting, previously had RCA stenting, previously he had coronary artery bypass graft surgery LIMA to LAD; dyslipidemia, diabetes, obesity, and hypothyroidism.\par He has put on 10 pounds in weight, and had rare infrequent exertional angina.\par He has had no further angina since his last visit but still complains of fatigue.\par \par \par

## 2022-10-18 NOTE — DISCUSSION/SUMMARY
[FreeTextEntry1] : Remain on  the metoprolol  75 mg daily.\par Maintain all  medications but d/c Ranexa\par f/u in 3 month

## 2022-11-06 ENCOUNTER — RX RENEWAL (OUTPATIENT)
Age: 72
End: 2022-11-06

## 2022-11-14 ENCOUNTER — NON-APPOINTMENT (OUTPATIENT)
Age: 72
End: 2022-11-14

## 2022-12-02 NOTE — ED ADULT NURSE NOTE - CHIEF COMPLAINT QUOTE
chest pain  nitro given pta Opzelura Counseling:  I discussed with the patient the risks of Opzelura including but not limited to nasopharngitis, bronchitis, ear infection, eosinophila, hives, diarrhea, folliculitis, tonsillitis, and rhinorrhea.  Taken orally, this medication has been linked to serious infections; higher rate of mortality; malignancy and lymphoproliferative disorders; major adverse cardiovascular events; thrombosis; thrombocytopenia, anemia, and neutropenia; and lipid elevations.

## 2023-01-03 ENCOUNTER — APPOINTMENT (OUTPATIENT)
Dept: CARDIOLOGY | Facility: CLINIC | Age: 73
End: 2023-01-03
Payer: MEDICARE

## 2023-01-03 ENCOUNTER — NON-APPOINTMENT (OUTPATIENT)
Age: 73
End: 2023-01-03

## 2023-01-03 VITALS
HEIGHT: 68 IN | BODY MASS INDEX: 30.31 KG/M2 | HEART RATE: 85 BPM | OXYGEN SATURATION: 99 % | SYSTOLIC BLOOD PRESSURE: 118 MMHG | TEMPERATURE: 97.1 F | WEIGHT: 200 LBS | DIASTOLIC BLOOD PRESSURE: 72 MMHG

## 2023-01-03 DIAGNOSIS — I25.110 ATHEROSCLEROTIC HEART DISEASE OF NATIVE CORONARY ARTERY WITH UNSTABLE ANGINA PECTORIS: ICD-10-CM

## 2023-01-03 DIAGNOSIS — I20.0 UNSTABLE ANGINA: ICD-10-CM

## 2023-01-03 PROCEDURE — 99215 OFFICE O/P EST HI 40 MIN: CPT

## 2023-01-03 NOTE — DISCUSSION/SUMMARY
[FreeTextEntry1] : Remain on  the metoprolol  75 mg daily.\par Maintain all  medications but d/c Ranexa\par Will be scheduled for cath this week

## 2023-01-03 NOTE — HISTORY OF PRESENT ILLNESS
[FreeTextEntry1] : \par he has new onset  chest pain on exertion\par He has no shortness of breath\par He has no palpitations\par He has no syncope\par He is neurologically intact\par He has no edema\par He has no GI symptoms\par Groin mildly ecchymotic\par \par

## 2023-01-03 NOTE — REASON FOR VISIT
[Follow-Up - Clinic] : a clinic follow-up of [Coronary Artery Disease] : coronary artery disease [Hyperlipidemia] : hyperlipidemia [Hypertension] : hypertension [FreeTextEntry3] : Dr. Schulte [FreeTextEntry1] : I saw this 72-year-old man,in follow up on 01/03/23\par On 06/08/22 he underwent recath and had a discrete tight restenosis at the ostium of the OM1, which was restented.  He is feeling exceptionally well with no symptoms, has no access site issues, and is functioning at 100%.\par 11/08 he presented again to the hospital with ongoing chest pain and NSTEMI and had urgent recath which showed severe restenosis in the left main, and was restented.  He had congestive heart failure immediately postprocedure which resolved with IV diuretics.  An echocardiogram done 48 hours later revealed normal LV function.  He was symptom-free and feeling well but is now having occasional exertional chest pain.\par He had radiation therapy to the left main and circumflex  12/19\par His history includes CAD,.  He had OM stenting, previously had RCA stenting, previously he had coronary artery bypass graft surgery LIMA to LAD; dyslipidemia, diabetes, obesity, and hypothyroidism.\par He has put on 10 pounds in weight, and had rare infrequent exertional angina.\par He has had no further angina since his last visit but the last few days he has started to experience new onset exertional angina, with less and less exertion\par \par \par

## 2023-01-12 ENCOUNTER — APPOINTMENT (OUTPATIENT)
Dept: CARDIOLOGY | Facility: CLINIC | Age: 73
End: 2023-01-12
Payer: MEDICARE

## 2023-01-12 ENCOUNTER — NON-APPOINTMENT (OUTPATIENT)
Age: 73
End: 2023-01-12

## 2023-01-12 VITALS
HEART RATE: 79 BPM | TEMPERATURE: 96.6 F | BODY MASS INDEX: 28.95 KG/M2 | DIASTOLIC BLOOD PRESSURE: 76 MMHG | WEIGHT: 191 LBS | OXYGEN SATURATION: 99 % | HEIGHT: 68 IN | SYSTOLIC BLOOD PRESSURE: 134 MMHG

## 2023-01-12 PROCEDURE — 99215 OFFICE O/P EST HI 40 MIN: CPT

## 2023-01-12 PROCEDURE — 93000 ELECTROCARDIOGRAM COMPLETE: CPT

## 2023-01-12 RX ORDER — SEMAGLUTIDE 1.34 MG/ML
4 INJECTION, SOLUTION SUBCUTANEOUS
Qty: 9 | Refills: 0 | Status: DISCONTINUED | COMMUNITY
Start: 2022-05-19 | End: 2023-01-12

## 2023-01-12 NOTE — DISCUSSION/SUMMARY
[FreeTextEntry1] : Remain on  the metoprolol  75 mg daily.\par Maintain all  medications but d/c Ranexa\par He has a large ecchymotic area around the access site, but no painful lumps or bumps.

## 2023-01-12 NOTE — REASON FOR VISIT
[Follow-Up - Clinic] : a clinic follow-up of [Coronary Artery Disease] : coronary artery disease [Hyperlipidemia] : hyperlipidemia [Hypertension] : hypertension [FreeTextEntry3] : Dr. Schulte [FreeTextEntry1] : I saw this 72-year-old man,in follow up on 01/12/23\par On 06/08/22 he underwent recath and had a discrete tight restenosis at the ostium of the OM1, which was restented.  He is feeling exceptionally well with no symptoms, has no access site issues, and is functioning at 100%.\par 11/08 he presented again to the hospital with ongoing chest pain and NSTEMI and had urgent recath which showed severe restenosis in the left main, and was restented.  He had congestive heart failure immediately postprocedure which resolved with IV diuretics.  An echocardiogram done 48 hours later revealed normal LV function.  He was symptom-free and feeling well but is now having occasional exertional chest pain.\par He had radiation therapy to the left main and circumflex  12/19\par His history includes CAD,.  He had OM stenting, previously had RCA stenting, previously he had coronary artery bypass graft surgery LIMA to LAD; dyslipidemia, diabetes, obesity, and hypothyroidism.\par He has put on 10 pounds in weight, and had rare infrequent exertional angina.\par He has had no further angina since his last visit but the last few days he has started to experience new onset exertional angina, with less and less exertion\par .  Cath showed restenosis in the left main and mid circumflex and I restented both areas. 01/04/23\par \par \par

## 2023-05-02 ENCOUNTER — LABORATORY RESULT (OUTPATIENT)
Age: 73
End: 2023-05-02

## 2023-05-02 ENCOUNTER — APPOINTMENT (OUTPATIENT)
Dept: CARDIOLOGY | Facility: CLINIC | Age: 73
End: 2023-05-02
Payer: MEDICARE

## 2023-05-02 ENCOUNTER — NON-APPOINTMENT (OUTPATIENT)
Age: 73
End: 2023-05-02

## 2023-05-02 VITALS
HEIGHT: 68 IN | OXYGEN SATURATION: 96 % | BODY MASS INDEX: 30.62 KG/M2 | SYSTOLIC BLOOD PRESSURE: 142 MMHG | DIASTOLIC BLOOD PRESSURE: 68 MMHG | WEIGHT: 202 LBS | HEART RATE: 91 BPM | RESPIRATION RATE: 14 BRPM

## 2023-05-02 PROCEDURE — 99215 OFFICE O/P EST HI 40 MIN: CPT

## 2023-05-02 RX ORDER — ARGININE HCL 1000 MG
TABLET ORAL
Refills: 0 | Status: DISCONTINUED | COMMUNITY
End: 2023-05-02

## 2023-05-02 NOTE — DISCUSSION/SUMMARY
[FreeTextEntry1] : Remain on  the metoprolol  75 mg daily.\par Maintain all  medications but d/c Ranexa\par Reschedule for cardiac cath

## 2023-05-02 NOTE — REASON FOR VISIT
[Follow-Up - Clinic] : a clinic follow-up of [Coronary Artery Disease] : coronary artery disease [Hyperlipidemia] : hyperlipidemia [Hypertension] : hypertension [FreeTextEntry3] : Dr. Schulte [FreeTextEntry1] : I saw this 72-year-old man,in follow up on 05/02/23\par Comes in today because he is beginning to experience exertional angina.  He has had 5 episodes.\par On 06/08/22 he underwent recath and had a discrete tight restenosis at the ostium of the OM1, which was restented.  He is feeling exceptionally well with no symptoms, has no access site issues, and is functioning at 100%.\par 11/08 he presented again to the hospital with ongoing chest pain and NSTEMI and had urgent recath which showed severe restenosis in the left main, and was restented.  He had congestive heart failure immediately postprocedure which resolved with IV diuretics.  An echocardiogram done 48 hours later revealed normal LV function.  He was symptom-free and feeling well but is now having occasional exertional chest pain.\par He had radiation therapy to the left main and circumflex  12/19\par His history includes CAD,.  He had OM stenting, previously had RCA stenting, previously he had coronary artery bypass graft surgery LIMA to LAD; dyslipidemia, diabetes, obesity, and hypothyroidism.\par He has put on 10 pounds in weight, and had rare infrequent exertional angina.\par He has had no further angina since his last visit but the last few days he has started to experience new onset exertional angina, with less and less exertion\par .  Cath showed restenosis in the left main and mid circumflex and I restented both areas. 01/04/23\par Has angina again and we will repeat the cath.\par \par \par

## 2023-05-02 NOTE — HISTORY OF PRESENT ILLNESS
[FreeTextEntry1] : \par he has 5 episodes of exertional  chest pain\par He has no shortness of breath\par He has no palpitations\par He has no syncope\par He is neurologically intact\par He has no edema\par He has no GI symptoms\par Groin mildly ecchymotic\par \par

## 2023-05-11 ENCOUNTER — APPOINTMENT (OUTPATIENT)
Dept: CARDIOLOGY | Facility: CLINIC | Age: 73
End: 2023-05-11
Payer: MEDICARE

## 2023-05-11 VITALS
OXYGEN SATURATION: 97 % | HEART RATE: 93 BPM | HEIGHT: 68 IN | DIASTOLIC BLOOD PRESSURE: 74 MMHG | WEIGHT: 204 LBS | SYSTOLIC BLOOD PRESSURE: 140 MMHG | BODY MASS INDEX: 30.92 KG/M2

## 2023-05-11 DIAGNOSIS — Z00.00 ENCOUNTER FOR GENERAL ADULT MEDICAL EXAMINATION W/OUT ABNORMAL FINDINGS: ICD-10-CM

## 2023-05-11 PROCEDURE — 99215 OFFICE O/P EST HI 40 MIN: CPT

## 2023-05-11 NOTE — DISCUSSION/SUMMARY
[FreeTextEntry1] : Remain on  the metoprolol  75 mg daily.\par Maintain all  medications but d/c Ranexa\par Return in 6 months

## 2023-05-11 NOTE — REASON FOR VISIT
[Follow-Up - Clinic] : a clinic follow-up of [Coronary Artery Disease] : coronary artery disease [Hyperlipidemia] : hyperlipidemia [Hypertension] : hypertension [FreeTextEntry3] : Dr. Schulte [FreeTextEntry1] : I saw this 72-year-old man,in follow up on 05/11/23\par To everybody surprise, the repeat cath showed no evidence of restenosis with a very patent mammary with good flow as well.\par \par On 06/08/22 he underwent recath and had a discrete tight restenosis at the ostium of the OM1, which was restented.  He is feeling exceptionally well with no symptoms, has no access site issues, and is functioning at 100%.\par 11/08 he presented again to the hospital with ongoing chest pain and NSTEMI and had urgent recath which showed severe restenosis in the left main, and was restented.  He had congestive heart failure immediately postprocedure which resolved with IV diuretics.  An echocardiogram done 48 hours later revealed normal LV function.  He was symptom-free and feeling well but is now having occasional exertional chest pain.\par He had radiation therapy to the left main and circumflex  12/19\par His history includes CAD,.  He had OM stenting, previously had RCA stenting, previously he had coronary artery bypass graft surgery LIMA to LAD; dyslipidemia, diabetes, obesity, and hypothyroidism.\par He has put on 10 pounds in weight, and had rare infrequent exertional angina.\par He has had no further angina since his last visit but the last few days he has started to experience new onset exertional angina, with less and less exertion\par .  Cath showed restenosis in the left main and mid circumflex and I restented both areas. 01/04/23\par Has angina again and we will repeat the cath.\par \par \par

## 2023-05-18 RX ORDER — TICAGRELOR 90 MG/1
90 TABLET ORAL
Qty: 180 | Refills: 3 | Status: ACTIVE | COMMUNITY
Start: 2019-04-29 | End: 1900-01-01

## 2023-07-17 ENCOUNTER — RX RENEWAL (OUTPATIENT)
Age: 73
End: 2023-07-17

## 2023-07-17 RX ORDER — EZETIMIBE 10 MG/1
10 TABLET ORAL
Qty: 90 | Refills: 3 | Status: ACTIVE | COMMUNITY
Start: 2019-07-23 | End: 1900-01-01

## 2023-09-12 RX ORDER — LISINOPRIL 5 MG/1
5 TABLET ORAL
Qty: 90 | Refills: 3 | Status: ACTIVE | COMMUNITY
Start: 1900-01-01 | End: 1900-01-01

## 2023-10-09 RX ORDER — METOPROLOL SUCCINATE 25 MG/1
25 TABLET, EXTENDED RELEASE ORAL
Qty: 270 | Refills: 3 | Status: ACTIVE | COMMUNITY
Start: 1900-01-01 | End: 1900-01-01

## 2023-11-07 ENCOUNTER — APPOINTMENT (OUTPATIENT)
Dept: CARDIOLOGY | Facility: CLINIC | Age: 73
End: 2023-11-07
Payer: MEDICARE

## 2023-11-07 VITALS
DIASTOLIC BLOOD PRESSURE: 76 MMHG | HEART RATE: 85 BPM | SYSTOLIC BLOOD PRESSURE: 118 MMHG | WEIGHT: 200 LBS | HEIGHT: 68 IN | BODY MASS INDEX: 30.31 KG/M2 | OXYGEN SATURATION: 97 %

## 2023-11-07 PROCEDURE — 99212 OFFICE O/P EST SF 10 MIN: CPT

## 2023-12-27 LAB — HBA1C MFR BLD HPLC: 6.1

## 2024-01-18 ENCOUNTER — APPOINTMENT (OUTPATIENT)
Dept: CARDIOLOGY | Facility: CLINIC | Age: 74
End: 2024-01-18
Payer: MEDICARE

## 2024-01-18 VITALS
HEART RATE: 91 BPM | WEIGHT: 200 LBS | SYSTOLIC BLOOD PRESSURE: 126 MMHG | OXYGEN SATURATION: 97 % | HEIGHT: 68 IN | DIASTOLIC BLOOD PRESSURE: 68 MMHG | BODY MASS INDEX: 30.31 KG/M2

## 2024-01-18 PROCEDURE — 99214 OFFICE O/P EST MOD 30 MIN: CPT

## 2024-01-18 RX ORDER — ALIROCUMAB 75 MG/ML
75 INJECTION, SOLUTION SUBCUTANEOUS
Qty: 6 | Refills: 3 | Status: DISCONTINUED | COMMUNITY
Start: 2020-08-19 | End: 2024-01-18

## 2024-01-18 NOTE — REASON FOR VISIT
[FreeTextEntry3] : Dr. Schulte [FreeTextEntry1] : I saw this 72-year-old man,in follow up on 05/11/23\par  To everybody surprise, the repeat cath showed no evidence of restenosis with a very patent mammary with good flow as well.\par  \par  On 06/08/22 he underwent recath and had a discrete tight restenosis at the ostium of the OM1, which was restented.  He is feeling exceptionally well with no symptoms, has no access site issues, and is functioning at 100%.\par  11/08 he presented again to the hospital with ongoing chest pain and NSTEMI and had urgent recath which showed severe restenosis in the left main, and was restented.  He had congestive heart failure immediately postprocedure which resolved with IV diuretics.  An echocardiogram done 48 hours later revealed normal LV function.  He was symptom-free and feeling well but is now having occasional exertional chest pain.\par  He had radiation therapy to the left main and circumflex  12/19\par  His history includes CAD,.  He had OM stenting, previously had RCA stenting, previously he had coronary artery bypass graft surgery LIMA to LAD; dyslipidemia, diabetes, obesity, and hypothyroidism.\par  He has put on 10 pounds in weight, and had rare infrequent exertional angina.\par  He has had no further angina since his last visit but the last few days he has started to experience new onset exertional angina, with less and less exertion\par  .  Cath showed restenosis in the left main and mid circumflex and I restented both areas. 01/04/23\par  Has angina again and we will repeat the cath.\par  \par  \par   [Follow-Up - Clinic] : a clinic follow-up of [Coronary Artery Disease] : coronary artery disease [Hyperlipidemia] : hyperlipidemia [Hypertension] : hypertension

## 2024-01-18 NOTE — DISCUSSION/SUMMARY
[Procedure Low Risk] : the procedure risk is low [Patient Low Risk] : the patient is a low surgical risk [Optimized for Surgery] : the patient is optimized for surgery [As per surgery] : as per surgery [Continue] : Continue medications as currently directed [FreeTextEntry1] : Remain on  the metoprolol  75 mg daily. Maintain all  medications but d/c Ranexa Return in 6 months The patient is cleared to undergo colonoscopy, will stop the Brilinta 1 week before the procedure, but remain on aspirin 81 mg daily.

## 2024-01-18 NOTE — HISTORY OF PRESENT ILLNESS
[Preoperative Visit] : for a medical evaluation prior to surgery [Scheduled Procedure ___] : a [unfilled] [Date of Surgery ___] : on [unfilled] [Surgeon Name ___] : surgeon: [unfilled] [Good] : Good [Fever] : no fever [Chills] : no chills [Fatigue] : no fatigue [Chest Pain] : no chest pain [Cough] : no cough [Dyspnea] : no dyspnea [Dysuria] : no dysuria [Urinary Frequency] : no urinary frequency [Nausea] : no nausea [Vomiting] : no vomiting [Diarrhea] : no diarrhea [Abdominal Pain] : no abdominal pain [Easy Bruising] : no easy bruising [Lower Extremity Swelling] : no lower extremity swelling [Poor Exercise Tolerance] : poor exercise tolerance [Diabetes] : diabetes [Cardiovascular Disease] : cardiovascular disease [Pulmonary Disease] : no pulmonary disease [Anti-Platelet Agents] : anti-platelet agents [Nicotine Dependence] : no nicotine dependence [Alcohol Use] : no  alcohol use [Renal Disease] : no renal disease [GI Disease] : no gastrointestinal disease [Sleep Apnea] : no sleep apnea [Thromboembolic Problems] : no thromboembolic problems [Frequent use of NSAIDs] : no use of NSAIDs [Transfusion Reaction] : no transfusion reaction [Impaired Immunity] : no impaired immunity [Steroid Use in Last 6 Months] : no steroid use in the last six months [Frequent Aspirin Use] : frequent aspirin use [Prior Anesthesia] : Prior anesthesia [Prev Anesthesia Reaction] : no previous anesthesia reaction [Electrocardiogram] : ~T an ECG ~C was performed [Echocardiogram] : ~T an echocardiogram ~C was performed [Cardiac Catheterization  (Diagnostic)] : cardiac catheterization ~T ~C was performed [Cardiovascular Stress Test] : a cardiac stress test ~T ~C was performed [Anesthesia Reaction] : no anesthesia reaction [Sudden Death] : no sudden death [Clotting Disorder] : no clotting disorder [Bleeding Disorder] : no bleeding disorder [FreeTextEntry1] : \par  he has 5 episodes of exertional  chest pain\par  He has no shortness of breath\par  He has no palpitations\par  He has no syncope\par  He is neurologically intact\par  He has no edema\par  He has no GI symptoms\par  Groin mildly ecchymotic\par  \par

## 2024-01-24 RX ORDER — EVOLOCUMAB 140 MG/ML
140 INJECTION, SOLUTION SUBCUTANEOUS
Qty: 1 | Refills: 6 | Status: ACTIVE | COMMUNITY
Start: 2024-01-18

## 2024-01-25 ENCOUNTER — APPOINTMENT (OUTPATIENT)
Dept: CARDIOLOGY | Facility: CLINIC | Age: 74
End: 2024-01-25
Payer: MEDICARE

## 2024-01-25 PROCEDURE — 93880 EXTRACRANIAL BILAT STUDY: CPT

## 2024-02-05 ENCOUNTER — NON-APPOINTMENT (OUTPATIENT)
Age: 74
End: 2024-02-05

## 2024-02-15 ENCOUNTER — RESULT REVIEW (OUTPATIENT)
Age: 74
End: 2024-02-15

## 2024-02-17 ENCOUNTER — TRANSCRIPTION ENCOUNTER (OUTPATIENT)
Age: 74
End: 2024-02-17

## 2024-02-20 ENCOUNTER — APPOINTMENT (OUTPATIENT)
Dept: CARDIOLOGY | Facility: CLINIC | Age: 74
End: 2024-02-20
Payer: MEDICARE

## 2024-02-20 VITALS
DIASTOLIC BLOOD PRESSURE: 66 MMHG | SYSTOLIC BLOOD PRESSURE: 120 MMHG | HEIGHT: 68 IN | OXYGEN SATURATION: 98 % | WEIGHT: 197 LBS | HEART RATE: 90 BPM | BODY MASS INDEX: 29.86 KG/M2

## 2024-02-20 PROCEDURE — G2211 COMPLEX E/M VISIT ADD ON: CPT

## 2024-02-20 PROCEDURE — 99215 OFFICE O/P EST HI 40 MIN: CPT

## 2024-02-20 NOTE — REASON FOR VISIT
[FreeTextEntry3] : Dr. Schulte [FreeTextEntry1] : I saw this 73-year-old man,in follow up on 02/20/24 To everybody surprise, the repeat cath showed no evidence of restenosis with a very patent mammary with good flow as well. However a week ago he stopped the Brilinta for 3 days in preparation for colonoscopy and on the morning of the colonoscopy before it could be done he presented with acute occlusion of the circumflex marginal chest pain and elevated troponins.  He had successful ballooning of the marginal as well as the mid to distal circumflex. He is feeling well without any symptoms.  On 06/08/22 he underwent recath and had a discrete tight restenosis at the ostium of the OM1, which was restented.  He is feeling exceptionally well with no symptoms, has no access site issues, and is functioning at 100%. 11/08 he presented again to the hospital with ongoing chest pain and NSTEMI and had urgent recath which showed severe restenosis in the left main, and was restented.  He had congestive heart failure immediately postprocedure which resolved with IV diuretics.  An echocardiogram done 48 hours later revealed normal LV function.  He was symptom-free and feeling well but is now having occasional exertional chest pain. He had radiation therapy to the left main and circumflex  12/19 His history includes CAD,.  He had OM stenting, previously had RCA stenting, previously he had coronary artery bypass graft surgery LIMA to LAD; dyslipidemia, diabetes, obesity, and hypothyroidism. He has put on 10 pounds in weight, and had rare infrequent exertional angina. He has had no further angina since his last visit but the last few days he has started to experience new onset exertional angina, with less and less exertion .  Cath showed restenosis in the left main and mid circumflex and I restented both areas. 01/04/23 Has angina again and we will repeat the cath.    [Follow-Up - Clinic] : a clinic follow-up of [Coronary Artery Disease] : coronary artery disease [Hyperlipidemia] : hyperlipidemia [Hypertension] : hypertension

## 2024-02-20 NOTE — DISCUSSION/SUMMARY
[Procedure Low Risk] : the procedure risk is low [Patient Low Risk] : the patient is a low surgical risk [Optimized for Surgery] : the patient is optimized for surgery [As per surgery] : as per surgery [Continue] : Continue medications as currently directed [FreeTextEntry1] : Remain on  the metoprolol  75 mg daily. Maintain all  medications but d/c Ranexa Return in 4 months I will discuss with Dr. Car's the choice of laser atherectomy if warranted.

## 2024-02-20 NOTE — HISTORY OF PRESENT ILLNESS
[Preoperative Visit] : for a medical evaluation prior to surgery [Scheduled Procedure ___] : a [unfilled] [Date of Surgery ___] : on [unfilled] [Surgeon Name ___] : surgeon: [unfilled] [Good] : Good [Fever] : no fever [Chills] : no chills [Fatigue] : no fatigue [Chest Pain] : no chest pain [Cough] : no cough [Dyspnea] : no dyspnea [Dysuria] : no dysuria [Urinary Frequency] : no urinary frequency [Nausea] : no nausea [Vomiting] : no vomiting [Diarrhea] : no diarrhea [Abdominal Pain] : no abdominal pain [Easy Bruising] : no easy bruising [Lower Extremity Swelling] : no lower extremity swelling [Poor Exercise Tolerance] : poor exercise tolerance [Diabetes] : diabetes [Cardiovascular Disease] : cardiovascular disease [Pulmonary Disease] : no pulmonary disease [Anti-Platelet Agents] : anti-platelet agents [Nicotine Dependence] : no nicotine dependence [Alcohol Use] : no  alcohol use [Renal Disease] : no renal disease [GI Disease] : no gastrointestinal disease [Sleep Apnea] : no sleep apnea [Thromboembolic Problems] : no thromboembolic problems [Frequent use of NSAIDs] : no use of NSAIDs [Transfusion Reaction] : no transfusion reaction [Impaired Immunity] : no impaired immunity [Steroid Use in Last 6 Months] : no steroid use in the last six months [Frequent Aspirin Use] : frequent aspirin use [Prior Anesthesia] : Prior anesthesia [Prev Anesthesia Reaction] : no previous anesthesia reaction [Electrocardiogram] : ~T an ECG ~C was performed [Echocardiogram] : ~T an echocardiogram ~C was performed [Cardiac Catheterization  (Diagnostic)] : cardiac catheterization ~T ~C was performed [Cardiovascular Stress Test] : a cardiac stress test ~T ~C was performed [Anesthesia Reaction] : no anesthesia reaction [Sudden Death] : no sudden death [Clotting Disorder] : no clotting disorder [Bleeding Disorder] : no bleeding disorder [FreeTextEntry1] : he had no further chest pain He has no shortness of breath He has no palpitations He has no syncope He is neurologically intact He has no edema He has no GI symptoms Groin mildly ecchymotic, left

## 2024-03-05 ENCOUNTER — TRANSCRIPTION ENCOUNTER (OUTPATIENT)
Age: 74
End: 2024-03-05

## 2024-03-05 ENCOUNTER — RESULT REVIEW (OUTPATIENT)
Age: 74
End: 2024-03-05

## 2024-03-06 ENCOUNTER — INPATIENT (INPATIENT)
Facility: HOSPITAL | Age: 74
LOS: 0 days | Discharge: HOME | DRG: 325 | End: 2024-03-07
Attending: INTERNAL MEDICINE | Admitting: INTERNAL MEDICINE
Payer: COMMERCIAL

## 2024-03-06 ENCOUNTER — TRANSCRIPTION ENCOUNTER (OUTPATIENT)
Age: 74
End: 2024-03-06

## 2024-03-06 VITALS
HEIGHT: 68 IN | HEART RATE: 105 BPM | TEMPERATURE: 98 F | DIASTOLIC BLOOD PRESSURE: 70 MMHG | RESPIRATION RATE: 17 BRPM | WEIGHT: 197.31 LBS | OXYGEN SATURATION: 97 % | SYSTOLIC BLOOD PRESSURE: 130 MMHG

## 2024-03-06 DIAGNOSIS — Z95.1 PRESENCE OF AORTOCORONARY BYPASS GRAFT: Chronic | ICD-10-CM

## 2024-03-06 DIAGNOSIS — Z95.820 PERIPHERAL VASCULAR ANGIOPLASTY STATUS WITH IMPLANTS AND GRAFTS: Chronic | ICD-10-CM

## 2024-03-06 DIAGNOSIS — Z98.62 PERIPHERAL VASCULAR ANGIOPLASTY STATUS: Chronic | ICD-10-CM

## 2024-03-06 DIAGNOSIS — I21.4 NON-ST ELEVATION (NSTEMI) MYOCARDIAL INFARCTION: ICD-10-CM

## 2024-03-06 LAB
A1C WITH ESTIMATED AVERAGE GLUCOSE RESULT: 7.1 % — HIGH (ref 4–5.6)
ABO RH CONFIRMATION: SIGNIFICANT CHANGE UP
ALBUMIN SERPL ELPH-MCNC: 3.7 G/DL — SIGNIFICANT CHANGE UP (ref 3.3–5.2)
ALP SERPL-CCNC: 77 U/L — SIGNIFICANT CHANGE UP (ref 40–120)
ALT FLD-CCNC: 41 U/L — HIGH
ANION GAP SERPL CALC-SCNC: 12 MMOL/L — SIGNIFICANT CHANGE UP (ref 5–17)
APTT BLD: 51.5 SEC — HIGH (ref 24.5–35.6)
AST SERPL-CCNC: 31 U/L — SIGNIFICANT CHANGE UP
BASOPHILS # BLD AUTO: 0.04 K/UL — SIGNIFICANT CHANGE UP (ref 0–0.2)
BASOPHILS NFR BLD AUTO: 0.8 % — SIGNIFICANT CHANGE UP (ref 0–2)
BILIRUB SERPL-MCNC: 1.5 MG/DL — SIGNIFICANT CHANGE UP (ref 0.4–2)
BLD GP AB SCN SERPL QL: SIGNIFICANT CHANGE UP
BUN SERPL-MCNC: 14.4 MG/DL — SIGNIFICANT CHANGE UP (ref 8–20)
CALCIUM SERPL-MCNC: 8.6 MG/DL — SIGNIFICANT CHANGE UP (ref 8.4–10.5)
CHLORIDE SERPL-SCNC: 102 MMOL/L — SIGNIFICANT CHANGE UP (ref 96–108)
CHOLEST SERPL-MCNC: 60 MG/DL — SIGNIFICANT CHANGE UP
CO2 SERPL-SCNC: 25 MMOL/L — SIGNIFICANT CHANGE UP (ref 22–29)
CREAT SERPL-MCNC: 0.82 MG/DL — SIGNIFICANT CHANGE UP (ref 0.5–1.3)
EGFR: 94 ML/MIN/1.73M2 — SIGNIFICANT CHANGE UP
EOSINOPHIL # BLD AUTO: 0.07 K/UL — SIGNIFICANT CHANGE UP (ref 0–0.5)
EOSINOPHIL NFR BLD AUTO: 1.5 % — SIGNIFICANT CHANGE UP (ref 0–6)
ESTIMATED AVERAGE GLUCOSE: 157 MG/DL — HIGH (ref 68–114)
GLUCOSE BLDC GLUCOMTR-MCNC: 114 MG/DL — HIGH (ref 70–99)
GLUCOSE BLDC GLUCOMTR-MCNC: 141 MG/DL — HIGH (ref 70–99)
GLUCOSE SERPL-MCNC: 152 MG/DL — HIGH (ref 70–99)
HCT VFR BLD CALC: 36.2 % — LOW (ref 39–50)
HCT VFR BLD CALC: 40.4 % — SIGNIFICANT CHANGE UP (ref 39–50)
HCT VFR BLD CALC: 41.2 % — SIGNIFICANT CHANGE UP (ref 39–50)
HDLC SERPL-MCNC: 41 MG/DL — SIGNIFICANT CHANGE UP
HGB BLD-MCNC: 12.6 G/DL — LOW (ref 13–17)
HGB BLD-MCNC: 13.8 G/DL — SIGNIFICANT CHANGE UP (ref 13–17)
HGB BLD-MCNC: 14 G/DL — SIGNIFICANT CHANGE UP (ref 13–17)
IMM GRANULOCYTES NFR BLD AUTO: 0.2 % — SIGNIFICANT CHANGE UP (ref 0–0.9)
INR BLD: 1.11 RATIO — SIGNIFICANT CHANGE UP (ref 0.85–1.18)
LIPID PNL WITH DIRECT LDL SERPL: SIGNIFICANT CHANGE UP MG/DL
LYMPHOCYTES # BLD AUTO: 0.77 K/UL — LOW (ref 1–3.3)
LYMPHOCYTES # BLD AUTO: 16.1 % — SIGNIFICANT CHANGE UP (ref 13–44)
MAGNESIUM SERPL-MCNC: 1.9 MG/DL — SIGNIFICANT CHANGE UP (ref 1.6–2.6)
MCHC RBC-ENTMCNC: 31.8 PG — SIGNIFICANT CHANGE UP (ref 27–34)
MCHC RBC-ENTMCNC: 31.8 PG — SIGNIFICANT CHANGE UP (ref 27–34)
MCHC RBC-ENTMCNC: 32.4 PG — SIGNIFICANT CHANGE UP (ref 27–34)
MCHC RBC-ENTMCNC: 34 GM/DL — SIGNIFICANT CHANGE UP (ref 32–36)
MCHC RBC-ENTMCNC: 34.2 GM/DL — SIGNIFICANT CHANGE UP (ref 32–36)
MCHC RBC-ENTMCNC: 34.8 GM/DL — SIGNIFICANT CHANGE UP (ref 32–36)
MCV RBC AUTO: 93.1 FL — SIGNIFICANT CHANGE UP (ref 80–100)
MCV RBC AUTO: 93.1 FL — SIGNIFICANT CHANGE UP (ref 80–100)
MCV RBC AUTO: 93.6 FL — SIGNIFICANT CHANGE UP (ref 80–100)
MONOCYTES # BLD AUTO: 0.42 K/UL — SIGNIFICANT CHANGE UP (ref 0–0.9)
MONOCYTES NFR BLD AUTO: 8.8 % — SIGNIFICANT CHANGE UP (ref 2–14)
NEUTROPHILS # BLD AUTO: 3.46 K/UL — SIGNIFICANT CHANGE UP (ref 1.8–7.4)
NEUTROPHILS NFR BLD AUTO: 72.6 % — SIGNIFICANT CHANGE UP (ref 43–77)
NON HDL CHOLESTEROL: 19 MG/DL — SIGNIFICANT CHANGE UP
PLATELET # BLD AUTO: 152 K/UL — SIGNIFICANT CHANGE UP (ref 150–400)
PLATELET # BLD AUTO: 162 K/UL — SIGNIFICANT CHANGE UP (ref 150–400)
PLATELET # BLD AUTO: 174 K/UL — SIGNIFICANT CHANGE UP (ref 150–400)
POTASSIUM SERPL-MCNC: 4.3 MMOL/L — SIGNIFICANT CHANGE UP (ref 3.5–5.3)
POTASSIUM SERPL-SCNC: 4.3 MMOL/L — SIGNIFICANT CHANGE UP (ref 3.5–5.3)
PROT SERPL-MCNC: 6.4 G/DL — LOW (ref 6.6–8.7)
PROTHROM AB SERPL-ACNC: 12.3 SEC — SIGNIFICANT CHANGE UP (ref 9.5–13)
RBC # BLD: 3.89 M/UL — LOW (ref 4.2–5.8)
RBC # BLD: 4.34 M/UL — SIGNIFICANT CHANGE UP (ref 4.2–5.8)
RBC # BLD: 4.4 M/UL — SIGNIFICANT CHANGE UP (ref 4.2–5.8)
RBC # FLD: 13.4 % — SIGNIFICANT CHANGE UP (ref 10.3–14.5)
RBC # FLD: 13.7 % — SIGNIFICANT CHANGE UP (ref 10.3–14.5)
RBC # FLD: 13.7 % — SIGNIFICANT CHANGE UP (ref 10.3–14.5)
SODIUM SERPL-SCNC: 139 MMOL/L — SIGNIFICANT CHANGE UP (ref 135–145)
TRIGL SERPL-MCNC: 96 MG/DL — SIGNIFICANT CHANGE UP
TROPONIN T, HIGH SENSITIVITY RESULT: 255 NG/L — HIGH (ref 0–51)
WBC # BLD: 4.77 K/UL — SIGNIFICANT CHANGE UP (ref 3.8–10.5)
WBC # BLD: 5.81 K/UL — SIGNIFICANT CHANGE UP (ref 3.8–10.5)
WBC # BLD: 6.3 K/UL — SIGNIFICANT CHANGE UP (ref 3.8–10.5)
WBC # FLD AUTO: 4.77 K/UL — SIGNIFICANT CHANGE UP (ref 3.8–10.5)
WBC # FLD AUTO: 5.81 K/UL — SIGNIFICANT CHANGE UP (ref 3.8–10.5)
WBC # FLD AUTO: 6.3 K/UL — SIGNIFICANT CHANGE UP (ref 3.8–10.5)

## 2024-03-06 PROCEDURE — 93010 ELECTROCARDIOGRAM REPORT: CPT

## 2024-03-06 PROCEDURE — 92972 PERQ TRLUML CORONRY LITHOTRP: CPT

## 2024-03-06 PROCEDURE — 99152 MOD SED SAME PHYS/QHP 5/>YRS: CPT

## 2024-03-06 PROCEDURE — 93010 ELECTROCARDIOGRAM REPORT: CPT | Mod: 77

## 2024-03-06 PROCEDURE — 92978 ENDOLUMINL IVUS OCT C 1ST: CPT | Mod: 26,LC

## 2024-03-06 PROCEDURE — 92924 PRQ TRLUML C ATHRC 1 ART&/BR: CPT | Mod: LC

## 2024-03-06 PROCEDURE — 99223 1ST HOSP IP/OBS HIGH 75: CPT | Mod: 25

## 2024-03-06 PROCEDURE — 92925: CPT | Mod: LC

## 2024-03-06 RX ORDER — LISINOPRIL 2.5 MG/1
5 TABLET ORAL DAILY
Refills: 0 | Status: DISCONTINUED | OUTPATIENT
Start: 2024-03-06 | End: 2024-03-07

## 2024-03-06 RX ORDER — ATORVASTATIN CALCIUM 80 MG/1
1 TABLET, FILM COATED ORAL
Refills: 0 | DISCHARGE

## 2024-03-06 RX ORDER — ACETAMINOPHEN 500 MG
650 TABLET ORAL EVERY 6 HOURS
Refills: 0 | Status: DISCONTINUED | OUTPATIENT
Start: 2024-03-06 | End: 2024-03-07

## 2024-03-06 RX ORDER — METOPROLOL TARTRATE 50 MG
75 TABLET ORAL DAILY
Refills: 0 | Status: DISCONTINUED | OUTPATIENT
Start: 2024-03-06 | End: 2024-03-07

## 2024-03-06 RX ORDER — INSULIN DETEMIR 100/ML (3)
30 INSULIN PEN (ML) SUBCUTANEOUS
Refills: 0 | Status: DISCONTINUED | OUTPATIENT
Start: 2024-03-07 | End: 2024-03-07

## 2024-03-06 RX ORDER — LISINOPRIL 2.5 MG/1
1 TABLET ORAL
Refills: 0 | DISCHARGE

## 2024-03-06 RX ORDER — ATORVASTATIN CALCIUM 80 MG/1
0.5 TABLET, FILM COATED ORAL
Refills: 0 | DISCHARGE

## 2024-03-06 RX ORDER — DAPAGLIFLOZIN 10 MG/1
1 TABLET, FILM COATED ORAL
Qty: 0 | Refills: 0 | DISCHARGE

## 2024-03-06 RX ORDER — TICAGRELOR 90 MG/1
90 TABLET ORAL EVERY 12 HOURS
Refills: 0 | Status: DISCONTINUED | OUTPATIENT
Start: 2024-03-06 | End: 2024-03-07

## 2024-03-06 RX ORDER — METOPROLOL TARTRATE 50 MG
25 TABLET ORAL DAILY
Refills: 0 | Status: DISCONTINUED | OUTPATIENT
Start: 2024-03-06 | End: 2024-03-06

## 2024-03-06 RX ORDER — EZETIMIBE 10 MG/1
1 TABLET ORAL
Qty: 0 | Refills: 0 | DISCHARGE

## 2024-03-06 RX ORDER — OMEGA-3 ACID ETHYL ESTERS 1 G
2 CAPSULE ORAL
Qty: 0 | Refills: 0 | DISCHARGE

## 2024-03-06 RX ORDER — LIRAGLUTIDE 6 MG/ML
3 INJECTION SUBCUTANEOUS
Qty: 0 | Refills: 0 | DISCHARGE

## 2024-03-06 RX ORDER — INSULIN ASPART 100 [IU]/ML
0 INJECTION, SOLUTION SUBCUTANEOUS
Refills: 0 | DISCHARGE

## 2024-03-06 RX ORDER — METOPROLOL TARTRATE 50 MG
1 TABLET ORAL
Refills: 0 | DISCHARGE

## 2024-03-06 RX ORDER — INSULIN LISPRO 100/ML
VIAL (ML) SUBCUTANEOUS
Refills: 0 | Status: DISCONTINUED | OUTPATIENT
Start: 2024-03-06 | End: 2024-03-07

## 2024-03-06 RX ORDER — UBIDECARENONE 100 MG
400 CAPSULE ORAL
Qty: 0 | Refills: 0 | DISCHARGE

## 2024-03-06 RX ORDER — DEXTROSE 50 % IN WATER 50 %
15 SYRINGE (ML) INTRAVENOUS ONCE
Refills: 0 | Status: DISCONTINUED | OUTPATIENT
Start: 2024-03-06 | End: 2024-03-07

## 2024-03-06 RX ORDER — ASPIRIN/CALCIUM CARB/MAGNESIUM 324 MG
81 TABLET ORAL DAILY
Refills: 0 | Status: DISCONTINUED | OUTPATIENT
Start: 2024-03-06 | End: 2024-03-07

## 2024-03-06 RX ORDER — SODIUM CHLORIDE 9 MG/ML
250 INJECTION INTRAMUSCULAR; INTRAVENOUS; SUBCUTANEOUS ONCE
Refills: 0 | Status: COMPLETED | OUTPATIENT
Start: 2024-03-06 | End: 2024-03-06

## 2024-03-06 RX ORDER — SEMAGLUTIDE 0.68 MG/ML
2 INJECTION, SOLUTION SUBCUTANEOUS
Refills: 0 | DISCHARGE

## 2024-03-06 RX ORDER — LISINOPRIL 2.5 MG/1
0.5 TABLET ORAL
Refills: 0 | DISCHARGE

## 2024-03-06 RX ORDER — GLUCAGON INJECTION, SOLUTION 0.5 MG/.1ML
1 INJECTION, SOLUTION SUBCUTANEOUS ONCE
Refills: 0 | Status: DISCONTINUED | OUTPATIENT
Start: 2024-03-06 | End: 2024-03-07

## 2024-03-06 RX ORDER — ATORVASTATIN CALCIUM 80 MG/1
40 TABLET, FILM COATED ORAL AT BEDTIME
Refills: 0 | Status: DISCONTINUED | OUTPATIENT
Start: 2024-03-06 | End: 2024-03-07

## 2024-03-06 RX ORDER — INSULIN LISPRO 100/ML
VIAL (ML) SUBCUTANEOUS
Refills: 0 | Status: DISCONTINUED | OUTPATIENT
Start: 2024-03-06 | End: 2024-03-06

## 2024-03-06 RX ORDER — PANTOPRAZOLE SODIUM 20 MG/1
1 TABLET, DELAYED RELEASE ORAL
Qty: 0 | Refills: 0 | DISCHARGE

## 2024-03-06 RX ORDER — LIRAGLUTIDE 6 MG/ML
1.2 INJECTION SUBCUTANEOUS
Refills: 0 | DISCHARGE

## 2024-03-06 RX ORDER — ALIROCUMAB 75 MG/ML
75 INJECTION, SOLUTION SUBCUTANEOUS
Refills: 0 | DISCHARGE

## 2024-03-06 RX ORDER — METOPROLOL TARTRATE 50 MG
0.5 TABLET ORAL
Qty: 0 | Refills: 0 | DISCHARGE

## 2024-03-06 RX ORDER — DEXTROSE 50 % IN WATER 50 %
12.5 SYRINGE (ML) INTRAVENOUS ONCE
Refills: 0 | Status: DISCONTINUED | OUTPATIENT
Start: 2024-03-06 | End: 2024-03-07

## 2024-03-06 RX ORDER — SODIUM CHLORIDE 9 MG/ML
1000 INJECTION, SOLUTION INTRAVENOUS
Refills: 0 | Status: DISCONTINUED | OUTPATIENT
Start: 2024-03-06 | End: 2024-03-07

## 2024-03-06 RX ORDER — PANTOPRAZOLE SODIUM 20 MG/1
40 TABLET, DELAYED RELEASE ORAL
Refills: 0 | Status: DISCONTINUED | OUTPATIENT
Start: 2024-03-06 | End: 2024-03-07

## 2024-03-06 RX ORDER — DAPAGLIFLOZIN 10 MG/1
1 TABLET, FILM COATED ORAL
Refills: 0 | DISCHARGE

## 2024-03-06 RX ORDER — RANOLAZINE 500 MG/1
1 TABLET, FILM COATED, EXTENDED RELEASE ORAL
Qty: 0 | Refills: 0 | DISCHARGE

## 2024-03-06 RX ORDER — DEXTROSE 50 % IN WATER 50 %
25 SYRINGE (ML) INTRAVENOUS ONCE
Refills: 0 | Status: DISCONTINUED | OUTPATIENT
Start: 2024-03-06 | End: 2024-03-07

## 2024-03-06 RX ORDER — INSULIN DETEMIR 100/ML (3)
60 INSULIN PEN (ML) SUBCUTANEOUS
Refills: 0 | DISCHARGE

## 2024-03-06 RX ORDER — AMLODIPINE BESYLATE 2.5 MG/1
1 TABLET ORAL
Qty: 0 | Refills: 0 | DISCHARGE

## 2024-03-06 RX ORDER — METOPROLOL TARTRATE 50 MG
3 TABLET ORAL
Refills: 0 | DISCHARGE

## 2024-03-06 RX ORDER — RAMIPRIL 5 MG
1 CAPSULE ORAL
Qty: 0 | Refills: 0 | DISCHARGE

## 2024-03-06 RX ORDER — INSULIN DEGLUDEC 100 U/ML
0 INJECTION, SOLUTION SUBCUTANEOUS
Refills: 0 | DISCHARGE

## 2024-03-06 RX ADMIN — Medication 75 MILLIGRAM(S): at 14:33

## 2024-03-06 RX ADMIN — SODIUM CHLORIDE 125 MILLILITER(S): 9 INJECTION INTRAMUSCULAR; INTRAVENOUS; SUBCUTANEOUS at 14:00

## 2024-03-06 RX ADMIN — TICAGRELOR 90 MILLIGRAM(S): 90 TABLET ORAL at 17:27

## 2024-03-06 RX ADMIN — ATORVASTATIN CALCIUM 40 MILLIGRAM(S): 80 TABLET, FILM COATED ORAL at 22:17

## 2024-03-06 RX ADMIN — SODIUM CHLORIDE 250 MILLILITER(S): 9 INJECTION INTRAMUSCULAR; INTRAVENOUS; SUBCUTANEOUS at 10:30

## 2024-03-06 NOTE — H&P ADULT - [id number]_GEN_ALL_CORE_FT
PAST MEDICAL HISTORY:  CAD, multiple vessel     Diabetes mellitus     Former smoker     HLD (hyperlipidemia)     HTN (hypertension)

## 2024-03-06 NOTE — H&P ADULT - HISTORY OF PRESENT ILLNESS
73M PMH former smoker; CAD s/p 1v CABG (LIMA-LAD) >25 years ago; s/p multiple PCI's with stent placement; DM; HTN; HLD and recent hospitalization 2/15/24 for chest pain/ NSTEMI after several missed doses of Brilinta due to scheduled colonscopy. Patient developed acute onset of chest pain and underwent LHC at that time revealing widely patent LIMA-> LAD; known  of mRCA; occlusion of OM1; severe stenosis of the mLCx after Om1 bifurication treated with successful bifurication POBA to Lcx and Om1 systems. No new stents were placed due to prior brachytherapy in the Om1 and multiple layers of stents in both vessels.    Patient reports driving home from [employer] on 3/4/24 when he had sudden onset of chest pressure located anterior chest wall, substernal and nonradiating. Patient called his wife and [facility] and treatment. Initial EKG concerning for lateral ischemia with ST depression v2-v6 and I and AVR FABIO. Repeat ECG showed near resolution of ST segment changes in lateral leads and aVR. Chest pain had resolved. Trop 57-> 61-> 370. IV heparin infusion initiated and patient received MS04 in ER with some relief of chest pain. CXR performed at [facility] revealing calcified mitral area noted. Lungs remain clear. Echo performed 3/5/24 at [facility] revealing septal motion is abnormal consistent with prior cardiac surgery; LV systolic function is normal with EF 50%; mid inferior segment is abnormal.     Patient underwent LHC yesterday 3/5/24 with Dr. Jorge Luis Carvalho revealing 80% mCx lesion s/p POBA. Patient was transferred to [facility] for laser atherectomy and shockwave of Cx.       Symptoms:        Angina (Class): IV       Ischemic Symptoms: chest pain    Heart Failure:        Systolic/Diastolic/Combined: n/a       NYHA Class (within 2 weeks): n/a    Assessment of LVEF (Must be within 6 months):       EF: 50%       Assessed by: TTE        Date:  3/5/24    Prior Cardiac Interventions:       PCI's (Date, Stents, Vessels): 2/15/24 LHC at [facility] with Dr. Rodríguez:  LHC at that time revealing widely patent LIMA-> LAD; known  of mRCA; occlusion of OM1; severe stenosis of the mLCx after Om1 bifurication treated with successful bifurication POBA to Lcx and Om1 systems. No new stents were placed due to prior brachytherapy in the Om1 and multiple layers of stents in both vessels.    3/5/24 [facility] with Dr. Carvalho (PRELIMINARY VERBAL REPORT):  revealing 80% mCx lesion s/p POBA.         CABG (Date, Grafts): SANCHEZ-LAD (27 years old)    Echo (Date, Findings):  Echo performed 3/5/24 at [facility] revealing septal motion is abnormal consistent with prior cardiac surgery; LV systolic function is normal with EF 50%; mid inferior segment is abnormal.     Risk Assessments:  ASA: 3  Mallampati: 2  GFR:   Cr:  BRA:    Associated Risk Factors:        Cerebrovascular Disease: N/A       Chronic Lung Disease: N/A       Peripheral Arterial Disease: N/A       Chronic Kidney Disease (if yes, what is GFR): N/A       Uncontrolled Diabetes (if yes, what is HgbA1C or FBS): N/A       Poorly Controlled Hypertension (if yes, what is SBP): N/A       Morbid Obesity (if yes, what is BMI): N/A       History of Recent Ventricular Arrhythmia: N/A       Inability to Ambulate Safely: N/A       Need for Therapeutic Anticoagulation: N/A       Antiplatelet or Contrast Allergy: N/A     73M PMH former smoker; CAD s/p 1v CABG (LIMA-LAD) >25 years ago; s/p multiple PCI's with stent placement; DM; HTN; HLD and recent hospitalization 2/15/24 for chest pain/ NSTEMI after several missed doses of Brilinta due to scheduled colonscopy. Patient developed acute onset of chest pain and underwent LHC at that time revealing widely patent LIMA-> LAD; known  of mRCA; occlusion of OM1; severe stenosis of the mLCx after Om1 bifurication treated with successful bifurication POBA to Lcx and Om1 systems. No new stents were placed due to prior brachytherapy in the Om1 and multiple layers of stents in both vessels.    Patient reports driving home from [place] on 3/4/24 when he had sudden onset of chest pressure located anterior chest wall, substernal and nonradiating. Patient called his wife and went to [facility] for evaluation and treatment. Initial EKG concerning for lateral ischemia with ST depression v2-v6 and I and AVR FABIO. Repeat ECG showed near resolution of ST segment changes in lateral leads and aVR. Chest pain had resolved. Trop 57-> 61-> 370. IV heparin infusion initiated and patient received MS04 in ER with some relief of chest pain. CXR performed at [facility] revealing calcified mitral area noted. Lungs remain clear. Echo performed 3/5/24 at [facility] revealing septal motion is abnormal consistent with prior cardiac surgery; LV systolic function is normal with EF 50%; mid inferior segment is abnormal.     Patient underwent LHC yesterday 3/5/24 with Dr. Jorge Luis Carvalho revealing 80% mCx lesion s/p POBA. Patient was transferred to [facility] for laser atherectomy and shockwave of Cx.       Symptoms:        Angina (Class): IV       Ischemic Symptoms: chest pain    Heart Failure:        Systolic/Diastolic/Combined: n/a       NYHA Class (within 2 weeks): n/a    Assessment of LVEF (Must be within 6 months):       EF: 50%       Assessed by: TTE        Date:  3/5/24    Prior Cardiac Interventions:       PCI's (Date, Stents, Vessels): 2/15/24 LHC at [facility] with Dr. Rodríguez:  LHC at that time revealing widely patent LIMA-> LAD; known  of mRCA; occlusion of OM1; severe stenosis of the mLCx after Om1 bifurication treated with successful bifurication POBA to Lcx and Om1 systems. No new stents were placed due to prior brachytherapy in the Om1 and multiple layers of stents in both vessels.    3/5/24 [facility] with Dr. Carvalho (PRELIMINARY VERBAL REPORT):  revealing 80% mCx lesion s/p POBA.         CABG (Date, Grafts): SANCHEZ-LAD (27 years old)    Echo (Date, Findings):  Echo performed 3/5/24 at [facility] revealing septal motion is abnormal consistent with prior cardiac surgery; LV systolic function is normal with EF 50%; mid inferior segment is abnormal.     Risk Assessments:  ASA: 3  Mallampati: 2  GFR: 0.82  Cr: 94  BRA: 0.7%    Associated Risk Factors:        Cerebrovascular Disease: N/A       Chronic Lung Disease: N/A       Peripheral Arterial Disease: N/A       Chronic Kidney Disease (if yes, what is GFR): N/A       Uncontrolled Diabetes (if yes, what is HgbA1C or FBS): N/A       Poorly Controlled Hypertension (if yes, what is SBP): N/A       Morbid Obesity (if yes, what is BMI): N/A       History of Recent Ventricular Arrhythmia: N/A       Inability to Ambulate Safely: N/A       Need for Therapeutic Anticoagulation: N/A       Antiplatelet or Contrast Allergy: N/A     73M PMH former smoker; CAD s/p 1v CABG (LIMA-LAD) >25 years ago; s/p multiple PCI's with stent placement; DM; HTN; HLD and recent hospitalization 2/15/24 for chest pain/ NSTEMI after several missed doses of Brilinta due to scheduled colonscopy. Patient developed acute onset of chest pain and underwent LHC at that time revealing widely patent LIMA-> LAD; known  of mRCA; occlusion of OM1; severe stenosis of the mLCx after Om1 bifurication treated with successful bifurication POBA to Lcx and Om1 systems. No new stents were placed due to prior brachytherapy in the Om1 and multiple layers of stents in both vessels.    Patient reports driving home from [place] on 3/4/24 when he had sudden onset of chest pressure located anterior chest wall, substernal and nonradiating. Patient called his wife and went to [facility] for evaluation and treatment. Initial EKG concerning for lateral ischemia with ST depression v2-v6 and I and AVR FABIO. Repeat ECG showed near resolution of ST segment changes in lateral leads and aVR. Chest pain had resolved. Trop 57-> 61-> 370. IV heparin infusion initiated and patient received MS04 in ER with some relief of chest pain. CXR performed at [facility] revealing calcified mitral area noted. Lungs remain clear. Echo performed 3/5/24 at [facility] revealing septal motion is abnormal consistent with prior cardiac surgery; LV systolic function is normal with EF 50%; mid inferior segment is abnormal.     Patient underwent LHC yesterday 3/5/24 with Dr. Jorge Luis Carvalho revealing 80% mCx lesion s/p POBA. Patient was transferred to [facility] for laser atherectomy and shockwave of Cx. Patient received to [facility] with IV heparin infusion infusing at 900 units/hr.       Symptoms:        Angina (Class): IV       Ischemic Symptoms: chest pain    Heart Failure:        Systolic/Diastolic/Combined: n/a       NYHA Class (within 2 weeks): n/a    Assessment of LVEF (Must be within 6 months):       EF: 50%       Assessed by: TTE        Date:  3/5/24    Prior Cardiac Interventions:       PCI's (Date, Stents, Vessels): 2/15/24 LHC at [facility] with Dr. Rodríguez:  LHC at that time revealing widely patent LIMA-> LAD; known  of mRCA; occlusion of OM1; severe stenosis of the mLCx after Om1 bifurication treated with successful bifurication POBA to Lcx and Om1 systems. No new stents were placed due to prior brachytherapy in the Om1 and multiple layers of stents in both vessels.    3/5/24 [facility] with Dr. Carvalho (PRELIMINARY VERBAL REPORT):  revealing 80% mCx lesion s/p POBA.         CABG (Date, Grafts): SANCHEZ-LAD (27 years old)    Echo (Date, Findings):  Echo performed 3/5/24 at [facility] revealing septal motion is abnormal consistent with prior cardiac surgery; LV systolic function is normal with EF 50%; mid inferior segment is abnormal.     Risk Assessments:  ASA: 3  Mallampati: 2  GFR: 0.82  Cr: 94  BRA: 0.7%    Associated Risk Factors:        Cerebrovascular Disease: N/A       Chronic Lung Disease: N/A       Peripheral Arterial Disease: N/A       Chronic Kidney Disease (if yes, what is GFR): N/A       Uncontrolled Diabetes (if yes, what is HgbA1C or FBS): N/A       Poorly Controlled Hypertension (if yes, what is SBP): N/A       Morbid Obesity (if yes, what is BMI): N/A       History of Recent Ventricular Arrhythmia: N/A       Inability to Ambulate Safely: N/A       Need for Therapeutic Anticoagulation: N/A       Antiplatelet or Contrast Allergy: N/A     73M PMH former smoker; CAD s/p 1v CABG (LIMA-LAD) >25 years ago; s/p multiple PCI's with stent placement; DM; HTN; HLD and recent hospitalization 2/15/24 for chest pain/ NSTEMI after several missed doses of Brilinta due to scheduled colonscopy. Patient developed acute onset of chest pain and underwent LHC at that time revealing widely patent LIMA-> LAD; known  of mRCA; occlusion of OM1; severe stenosis of the mLCx after Om1 bifurication treated with successful bifurication POBA to Lcx and Om1 systems. No new stents were placed due to prior brachytherapy in the Om1 and multiple layers of stents in both vessels.    Patient reports driving home from [place] [employer] on 3/4/24 when he had sudden onset of chest pressure located anterior chest wall, substernal and nonradiating. Patient called his wife and went to [facility] for evaluation and treatment. Initial EKG concerning for lateral ischemia with ST depression v2-v6 and I and AVR FABIO. Repeat ECG showed near resolution of ST segment changes in lateral leads and aVR. Chest pain had resolved. Trop 57-> 61-> 370. IV heparin infusion initiated and patient received MS04 in ER with some relief of chest pain. CXR performed at [facility] revealing calcified mitral area noted. Lungs remain clear. Echo performed 3/5/24 at [facility] revealing septal motion is abnormal consistent with prior cardiac surgery; LV systolic function is normal with EF 50%; mid inferior segment is abnormal.     Patient underwent LHC yesterday 3/5/24 with Dr. Jorge Luis Carvalho revealing 80% mCx lesion s/p POBA. Patient was transferred to [facility] for laser atherectomy and shockwave of Cx. Patient received to [facility] with IV heparin infusion infusing at 900 units/hr. Patient denies any recurrent chest pain, chest pressure, shortness of breath, palpitations, dizziness, syncope, orthopnea, leg edema, arm pain, jaw pain or indigestion.     RRA site from yesterday's procedure with ecchymosis; no active bleeding, no hematoma. Right hand with petechiae. h/h PLT count stable. Patient denies right pain. RUE/ Right hand remains acyanotic; warm to touch; motor/sensory function intact; palpable radial pulse.       Symptoms:        Angina (Class): IV       Ischemic Symptoms: chest pain    Heart Failure:        Systolic/Diastolic/Combined: n/a       NYHA Class (within 2 weeks): n/a    Assessment of LVEF (Must be within 6 months):       EF: 50%       Assessed by: TTE        Date:  3/5/24    Prior Cardiac Interventions:       PCI's (Date, Stents, Vessels): 2/15/24 LHC at [facility] with Dr. Rodríguez:  LHC at that time revealing widely patent LIMA-> LAD; known  of mRCA; occlusion of OM1; severe stenosis of the mLCx after Om1 bifurication treated with successful bifurication POBA to Lcx and Om1 systems. No new stents were placed due to prior brachytherapy in the Om1 and multiple layers of stents in both vessels.    3/5/24 LHC with Dr. Carvalho (PRELIMINARY VERBAL REPORT):  revealing 80% mCx lesion s/p POBA.         CABG (Date, Grafts): SANCHEZ-LAD (27 years old)    Echo (Date, Findings):  Echo performed 3/5/24 at [facility] revealing septal motion is abnormal consistent with prior cardiac surgery; LV systolic function is normal with EF 50%; mid inferior segment is abnormal.     Risk Assessments:  ASA: 3  Mallampati: 2  GFR: 0.82  Cr: 94  BRA: 0.7%    Associated Risk Factors:        Cerebrovascular Disease: N/A       Chronic Lung Disease: N/A       Peripheral Arterial Disease: N/A       Chronic Kidney Disease (if yes, what is GFR): N/A       Uncontrolled Diabetes (if yes, what is HgbA1C or FBS): N/A       Poorly Controlled Hypertension (if yes, what is SBP): N/A       Morbid Obesity (if yes, what is BMI): N/A       History of Recent Ventricular Arrhythmia: N/A       Inability to Ambulate Safely: N/A       Need for Therapeutic Anticoagulation: N/A       Antiplatelet or Contrast Allergy: N/A     73M PMH CAD s/p 1v CABG (LIMA-LAD) >25 years ago; s/p multiple PCI's with stent placement; DM; HTN; HLD and recent hospitalization 2/15/24 for chest pain/ NSTEMI after several missed doses of Brilinta due to scheduled colonscopy. Patient developed acute onset of chest pain and underwent LHC at that time revealing widely patent LIMA-> LAD; known  of mRCA; occlusion of OM1; severe stenosis of the mLCx after Om1 bifurication treated with successful bifurication POBA to Lcx and Om1 systems. No new stents were placed due to prior brachytherapy in the Om1 and multiple layers of stents in both vessels.    Patient reports driving home from [employer] on 3/4/24 when he had sudden onset of chest pressure located anterior chest wall, substernal and nonradiating. Patient called his wife and went to [facility] for evaluation and treatment. Initial EKG concerning for lateral ischemia with ST depression v2-v6 and I and AVR FABIO. Repeat ECG showed near resolution of ST segment changes in lateral leads and aVR. Chest pain had resolved. Trop 57-> 61-> 370. IV heparin infusion initiated and patient received MS04 in ER with some relief of chest pain. CXR performed at [facility] revealing calcified mitral area noted. Lungs remain clear. Echo performed 3/5/24 at [facility] revealing septal motion is abnormal consistent with prior cardiac surgery; LV systolic function is normal with EF 50%; mid inferior segment is abnormal.     Patient underwent LHC yesterday 3/5/24 with Dr. Jorge Luis Carvalho revealing 80% mCx lesion s/p POBA. Patient was transferred to [facility] for laser atherectomy and shockwave of Cx. Patient received to [facility] with IV heparin infusion infusing at 900 units/hr. Patient denies any recurrent chest pain, chest pressure, shortness of breath, palpitations, dizziness, syncope, orthopnea, leg edema, arm pain, jaw pain or indigestion.     RRA site from yesterday's procedure with ecchymosis; no active bleeding, no hematoma. Right hand with petechiae. h/h PLT count stable. Patient denies right pain. RUE/ Right hand remains acyanotic; warm to touch; motor/sensory function intact; palpable radial pulse.       Symptoms:        Angina (Class): IV       Ischemic Symptoms: chest pain    Heart Failure:        Systolic/Diastolic/Combined: n/a       NYHA Class (within 2 weeks): n/a    Assessment of LVEF (Must be within 6 months):       EF: 50%       Assessed by: TTE        Date:  3/5/24    Prior Cardiac Interventions:       PCI's (Date, Stents, Vessels): 2/15/24 LHC at [facility] with Dr. Rodríguez:  LHC at that time revealing widely patent LIMA-> LAD; known  of mRCA; occlusion of OM1; severe stenosis of the mLCx after Om1 bifurication treated with successful bifurication POBA to Lcx and Om1 systems. No new stents were placed due to prior brachytherapy in the Om1 and multiple layers of stents in both vessels.    3/5/24 LHC with Dr. Carvalho (PRELIMINARY VERBAL REPORT):  revealing 80% mCx lesion s/p POBA.         CABG (Date, Grafts): SANCHEZ-LAD (27 years old)    Echo (Date, Findings):  Echo performed 3/5/24 at [facility] revealing septal motion is abnormal consistent with prior cardiac surgery; LV systolic function is normal with EF 50%; mid inferior segment is abnormal.     Risk Assessments:  ASA: 3  Mallampati: 2  GFR: 0.82  Cr: 94  BRA: 0.7%    Associated Risk Factors:        Cerebrovascular Disease: N/A       Chronic Lung Disease: N/A       Peripheral Arterial Disease: N/A       Chronic Kidney Disease (if yes, what is GFR): N/A       Uncontrolled Diabetes (if yes, what is HgbA1C or FBS): N/A       Poorly Controlled Hypertension (if yes, what is SBP): N/A       Morbid Obesity (if yes, what is BMI): N/A       History of Recent Ventricular Arrhythmia: N/A       Inability to Ambulate Safely: N/A       Need for Therapeutic Anticoagulation: N/A       Antiplatelet or Contrast Allergy: N/A

## 2024-03-06 NOTE — DISCHARGE NOTE PROVIDER - HOSPITAL COURSE
73M PMH former smoker; CAD s/p 1v CABG (LIMA-LAD) >25 years ago; s/p multiple PCI's with stent placement; DM; HTN; HLD and recent hospitalization 2/15/24 for chest pain/ NSTEMI after several missed doses of Brilinta due to scheduled colonscopy. Patient developed acute onset of chest pain and underwent LHC at that time revealing widely patent LIMA-> LAD; known  of mRCA; occlusion of OM1; severe stenosis of the mLCx after Om1 bifurication treated with successful bifurication POBA to Lcx and Om1 systems. No new stents were placed due to prior brachytherapy in the Om1 and multiple layers of stents in both vessels.    Patient reports driving home from [employer] on 3/4/24 when he had sudden onset of chest pressure located anterior chest wall, substernal and nonradiating. Patient called his wife and went to [facility] for [place] and treatment. Initial EKG concerning for lateral ischemia with ST depression v2-v6 and I and AVR FABIO. Repeat ECG showed near resolution of ST segment changes in lateral leads and aVR. Chest pain had resolved. Trop 57-> 61-> 370. IV heparin infusion initiated and patient received MS04 in ER with some relief of chest pain. CXR performed at [facility] revealing calcified mitral area noted. Lungs remain clear. Echo performed 3/5/24 at [facility] revealing septal motion is abnormal consistent with prior cardiac surgery; LV systolic function is normal with EF 50%; mid inferior segment is abnormal.     Patient underwent LHC yesterday 3/5/24 with Dr. Jorge Luis Carvalho revealing 80% mCx lesion s/p POBA. Patient was transferred to [facility] for laser atherectomy and shockwave of Cx. Patient received to [facility] with IV heparin infusion infusing at 900 units/hr. Patient denies any recurrent chest pain, chest pressure, shortness of breath, palpitations, dizziness, syncope, orthopnea, leg edema, arm pain, jaw pain or indigestion.     RRA site from yesterday's procedure with ecchymosis; no active bleeding, no hematoma. Right hand with petechiae. h/h PLT count stable. Patient denies right pain. RUE/ Right hand remains acyanotic; warm to touch; motor/sensory function intact; palpable radial pulse.     Now s/p LHC via LFA with Dr. Ángel Cabrera, pt tolerated procedure well. Pt arrived to recovery in NAD and HDS, LFA access site stable s/p perclose device, no bleed/hematoma, LLE remains acyanotic; warm to touch; motor/sensory function intact; palpable 1+ left DP/PT pulse. left groin appears with soft tissue swelling post procedure, manual pressure held by RN x 10 minutes.   Intraprocedurally: Lidocaine 1% 10ml; Midazolam 2mg IV; Fentanyl 125mcg IV; Heparin 15,000 units IV; NS bolus 125 ml; Omnipaque 110ml  Findings:  Laser Atherectomy; Intravascular Lithotripsy and Cutting balloon of proximal Cx 80% stenosis into LM and OM1; NO STENT WAS DEPLOYED (PRELIMINARY VERBAL REPORT FROM DR CABRERA; PENDING OFFICIAL REPORT)   Post Cath EKG: Sinus Tachycardia with more prominent ST depressions in V3-V6. EKG reviewed with Dr. Cabrera. Patient asymptomatic. Will repeat EKG in 1 hour.   Post procedure, patient denies chest pain, chest pressure, shortness of breath, palpitations, dizziness, arm pain, jaw pain, indigestion or nausea or vomiting.    Plan:  1. CAD  -Formal cath report pending  -Post procedure management/monitoring per protocol  -Access site precautions  -MAINTAIN SUPINE POSITION WITH HOB FLAT AND LLE STRAIGHT X 3 hours. If left groin site stable, ok to raise HOB to 30 degrees in 3 hours at 1700 (5pm)  -bedrest x 4 hours. If left groin site stable, ok to ambulate in 4 hours at 1800 (6pm)  -Labs and EKG in am  -NS 0.9% 250ml/hr x 1 bolus: post procedure RHONA ppx   -Repeat ECG if any clinical indication or change on tele  -Continue current medical therapy  -Dual anti platelet therapy with aspirin 81mg PO daily/ brilinta 90mg PO BID  -Cont BB with Toprol 75mg po daily   -Cont statin therapy with Lipitor 40mg po qHS **  -Educated regarding strict adherence with DAPT   -Educated regarding post procedure management and care  -Discussed the importance of RF modification  -Cardiac rehab info provided/referral and communication to cardiac rehab completed  -F/U outpt in 1-2 weeks with Cardiologist Dr. Reji Thomas  -DISPO:  Plan for D/C in am if remains HDS, ECG and labs in am stable and without complications    2. HTN:  Beta Blocker: Continue Toprol 75mg ER daily  RAASi: Continue Lisinopril 5mg PO daily  Other:    DASH Diet (to lower high blood pressure):  - Try to eat foods rich in potassium, calcium, magnesium, fiber, and protein  - Limit salt (sodium) intake to less than 1,500 mg per day  - Eat vegetables, fruits, and whole grains  - Include fat-free or low-fat dairy products, fish, poultry, beans, nuts, and vegetable oils  - Limit foods that are high in saturated or trans fat, such as fatty meats, full-fat dairy products, and tropical oils such as coconut, palm kernel, and palm oils  - Limit sugar-sweetened beverages and sweets    For more information: https://www.nhlbi.nih.gov/education/dash-eating-plan    3. HLD:   Goal Non-HDL < 100, LDL < 70  Lipid Panel: Lipid Profile (03.06.24 @ 10:20)    Cholesterol: 60 mg/dL    Triglycerides, Serum: 96 mg/dL    HDL Cholesterol: 41 mg/dL    Non HDL Cholesterol: 19: Patients Atherosclerotic Cardiovascular Disease (ASCVD) Risk  Optimal Level (mg/dL)    LDL Cholesterol Calculated: SEE NOTE: UNABLE TO CALCULATE.  TEST REPEATED AND VERIFIED. mg/dL  Statin: Continue atorvastatin 40mg PO QHS  Other: **LDL unable to calculate - patient is on praulent 75mg injectable every 2 weeks    4. DM:  -A1C: 7.1  -Continue insulin sliding scale while in hospital  -levemir 30 units q AM while in hospital.

## 2024-03-06 NOTE — H&P ADULT - NSHPPHYSICALEXAM_GEN_ALL_CORE
T(C): 36.4 (03-06-24 @ 10:35), Max: 36.4 (03-06-24 @ 10:35)  HR: 105 (03-06-24 @ 10:35) (105 - 105)  BP: 130/70 (03-06-24 @ 10:35) (130/70 - 130/70)  RR: 17 (03-06-24 @ 10:35) (17 - 17)  SpO2: 97% (03-06-24 @ 10:35) (97% - 97%)    CONSTITUTIONAL: Well groomed, no apparent distress  EYES: PERRLA and symmetric, EOMI, No conjunctival or scleral injection, non-icteric  ENMT: Oral mucosa with moist membranes. Normal dentition; no pharyngeal injection or exudates             NECK: Supple, symmetric and without tracheal deviation   RESP: No respiratory distress, no use of accessory muscles; CTA b/l, no WRR  CV: RRR, +S1S2, no MRG; no JVD; no peripheral edema  GI: Soft, NT, ND, no rebound, no guarding; no palpable masses; no hepatosplenomegaly; no hernia palpated  LYMPH: No cervical LAD or tenderness; no axillary LAD or tenderness; no inguinal LAD or tenderness  MSK:  Normal ROM without pain, no spinal tenderness, normal muscle strength/tone  SKIN: +ecchymosis to right wrist site/right forearm (s/p LHC yesterday); right hand petechaie; left forearm ecchymosis; large area of ecchymosis to left axillary area; left groin ecchymosis/ symphis pubis/ lateral left hip  NEURO: CN II-XII intact; normal reflexes in upper and lower extremities, sensation intact in upper and lower extremities b/l to light touch   PSYCH: Appropriate insight/judgment; A+O x 3, mood and affect appropriate, recent/remote memory intact  Extremities; RRA site with ecchymosis; no active bleeding,  no hematoma. b/l upper extremities acyanotic; warm to touch; motor/sensory function intact; right hand with petechiae; 1+ b/l radial pulses. b/l LE acyanotic; warm to touch; right foot cooler than left; motor/sensory function intact; palpable femoral pusles b/l; unable to obtain doppler right DP signal; +doppler right PT signal; +palpable left femoral DP/PT pulse

## 2024-03-06 NOTE — DISCHARGE NOTE PROVIDER - NSDCCPTREATMENT_GEN_ALL_CORE_FT
PRINCIPAL PROCEDURE  Procedure: Left heart cardiac cath  Findings and Treatment: -You had a cardiac catheterization with Dr. Ángel Rodríguez today on 3/6/24. Dr. Rodríguez accessed your left femoral artery during the procedure. That is the artery in your left groin.   -Please continue to monitor your left groin when you go home. If you develop any bleeding, lay down where you are and apply direct firm pressure until the bleeding stops. If the bleeding is excessive, please call 911.   -If heavy bleeding or large lumps form, hold pressure at the spot and come to the Emergency Room.  -No submerging the leg  in water for 48 hours. This includes hot tubs, swimming pools and jacuzzis.  You may start showering tomorrow on 3/7/24. Prior to showering, remove dressing from left groin. Shower with warm water and soap. Pat dry with towel. You may place a bandaid over the site. change the bandaid every day.   -Restricted use with no heavy lifting of affected arm for 5 days. No heavy lifting greater than 5lbs.  -You may walk indoors/ outdoors as tolerated. No strenuous exercise, gym, sports or heavy lifting x5 days. Please avoid stair climbing x 2 days.   -Please return to nearest ED if you develop any fever, chills, drainage from the incision site, or any change of temperature, color, sensation of the affected extremity.  -Call your doctor for any bleeding, swelling, loss of sensation in the hand or fingers, or fingers turning blue. or any bleeding, swelling, loss of sensation in the leg or foot or toes turning blue.   -Please return to nearest ED if you develop any chest pain, chest pressure, shortness of breath, jaw pain, pain radiating down the arm, palpitations, dizziness, palpitations, abdominal complaints including abdominal pain, nausea and vomiting.   -Please follow up with your cardiologist in 1-2 weeks

## 2024-03-06 NOTE — H&P ADULT - NSHPREVIEWOFSYSTEMS_GEN_ALL_CORE
Constitutional: no fevers, chills, or new weakness  Eyes: No double vision, no change in visual acuity, no blurry vision, no occular discharge  Neurologic: No new weakness, no seizure reported, headaches well controlled with PRN medications  Ears, nose, mouth throat:  No ottorrhea. No change in hearing, No anosmia, No oral lesions, No sore throat  Cardiovascular:+extensive hx CAD prior CABG; multiple stents/ PCI's; + chest pain that occurred on 3/4 no chest pain since; denies shortness of breath; no palpitations; no dizziness; no LYONS; no orthopnea; no PND; no arm pain, no jaw pain   Respiratory: No shortness of breath, No Cough  Gastrointestinal: No change in bowel habits, no change in appetite  Genitourinary: No Frequency, No Dysuria, no Hematuria  Musculoskeletal: No muscle wasting, No new weakness  Psych: No changes in mood, Denies drug use, Denies history of psyciatric illness  Integumentary: +ecchymosis to right wrist/ forearm from [facility] yesterday 3/5; petechaie of right hand; left forearm ecchymosis; large area of ecchymosis to left axillary area; ecchymosis from left symphis pubis to lateral hip  Endocrine:+ hx DM insulin depedendant  Heme/Lymph: +bruises easily; multiple areas of ecchymosis; on aspirin and brilinta  Allergic / Immune: No active allergies unless otherwise specified in medical chart

## 2024-03-06 NOTE — DISCHARGE NOTE PROVIDER - PROVIDER TOKENS
PROVIDER:[TOKEN:[[id number]],FOLLOWUP:[1 week],ESTABLISHEDPATIENT:[T]] PROVIDER:[TOKEN:[[id number]],FOLLOWUP:[1 week],ESTABLISHEDPATIENT:[T]],PROVIDER:[TOKEN:[[id number]],FOLLOWUP:[1 week],ESTABLISHEDPATIENT:[T]]

## 2024-03-06 NOTE — DISCHARGE NOTE PROVIDER - NSDCMRMEDTOKEN_GEN_ALL_CORE_FT
aspirin 81 mg oral delayed release tablet: 1 tab(s) orally once a day  atorvastatin 40 mg oral tablet: 1 tab(s) orally once a day (at bedtime)  ezetimibe 10 mg oral tablet: 1 tab(s) orally once a day  Farxiga 10 mg oral tablet: 1 tab(s) orally once a day  Levemir FlexPen 100 units/mL subcutaneous solution: 60 unit(s) subcutaneous once a day before breakfast  lisinopril 10 mg oral tablet: 0.5 tab(s) orally once a day  metoprolol succinate 25 mg oral capsule, extended release: 1 cap(s) orally once a day  NovoLOG FlexPen 100 units/mL injectable solution: injectable sliding scale  Omega-3 350 mg oral capsule: 2 cap(s) orally 2 times a day  Ozempic 8 mg/3 mL (2 mg dose) subcutaneous solution: 2 milligram(s) subcutaneously once a week  Praluent Pen 75 mg/mL subcutaneous solution: 75 milligram(s) subcutaneously every 2 weeks  ticagrelor 90 mg oral tablet: 1 tab(s) orally 2 times a day   acetaminophen 325 mg oral tablet: 2 tab(s) orally every 6 hours As needed Mild Pain (1 - 3)  aspirin 81 mg oral delayed release tablet: 1 tab(s) orally once a day  atorvastatin 40 mg oral tablet: 1 tab(s) orally once a day (at bedtime)  ezetimibe 10 mg oral tablet: 1 tab(s) orally once a day  Farxiga 10 mg oral tablet: 1 tab(s) orally once a day  Levemir FlexPen 100 units/mL subcutaneous solution: 60 unit(s) subcutaneous once a day before breakfast  lisinopril 10 mg oral tablet: 0.5 tab(s) orally once a day  metoprolol succinate 25 mg oral tablet, extended release: 3 tab(s) orally once a day  NovoLOG FlexPen 100 units/mL injectable solution: injectable sliding scale  Omega-3 350 mg oral capsule: 2 cap(s) orally 2 times a day  Ozempic 8 mg/3 mL (2 mg dose) subcutaneous solution: 2 milligram(s) subcutaneously once a week  Praluent Pen 75 mg/mL subcutaneous solution: 75 milligram(s) subcutaneously every 2 weeks  ticagrelor 90 mg oral tablet: 1 tab(s) orally 2 times a day

## 2024-03-06 NOTE — H&P ADULT - ASSESSMENT
Impression:  73M PMH former smoker; CAD s/p 1v CABG (LIMA-LAD) >25 years ago; s/p multiple PCI's with stent placement; DM; HTN; HLD and recent hospitalization 2/15/24 for chest pain/ NSTEMI after several missed doses of Brilinta due to scheduled colonscopy. Patient developed acute onset of chest pain and underwent LHC at that time revealing widely patent LIMA-> LAD; known  of mRCA; occlusion of OM1; severe stenosis of the mLCx after Om1 bifurication treated with successful bifurication POBA to Lcx and Om1 systems. No new stents were placed due to prior brachytherapy in the Om1 and multiple layers of stents in both vessels.    Patient reports driving home from [employer] on 3/4/24 when he had sudden onset of chest pressure located anterior chest wall, substernal and nonradiating. Patient called his wife and went to [facility] for evaluation and treatment. Initial EKG concerning for lateral ischemia with ST depression v2-v6 and I and AVR FABIO. Repeat ECG showed near resolution of ST segment changes in lateral leads and aVR. Chest pain had resolved. Trop 57-> 61-> 370. IV heparin infusion initiated and patient received MS04 in ER with some relief of chest pain. CXR performed at [facility] revealing calcified mitral area noted. Lungs remain clear. Echo performed 3/5/24 at [facility] revealing septal motion is abnormal consistent with prior cardiac surgery; LV systolic function is normal with EF 50%; mid inferior segment is abnormal.     Patient underwent LHC yesterday 3/5/24 with Dr. Jorge Luis Carvalho revealing 80% mCx lesion s/p POBA. Patient was transferred to [facility] for laser atherectomy and shockwave of Cx.     Plan:  -plan for LHC +Rotational Atherectomy/ Shockwave of Cx  -patient seen and examined  -confirmed appropriate NPO duration  -ECG and Labs reviewed  -Aspirin 81mg po pre-cath  -procedure discussed with patient; risks and benefits explained, questions answered  -consent obtained by attending IC  -0.9% NS 250cc bolus ordered to prevent RHONA   Impression:  73M PMH former smoker; CAD s/p 1v CABG (LIMA-LAD) >25 years ago; s/p multiple PCI's with stent placement; DM; HTN; HLD and recent hospitalization 2/15/24 for chest pain/ NSTEMI after several missed doses of Brilinta due to scheduled colonscopy. Patient developed acute onset of chest pain and underwent LHC at that time revealing widely patent LIMA-> LAD; known  of mRCA; occlusion of OM1; severe stenosis of the mLCx after Om1 bifurication treated with successful bifurication POBA to Lcx and Om1 systems. No new stents were placed due to prior brachytherapy in the Om1 and multiple layers of stents in both vessels.    Patient reports driving home from [employer] on 3/4/24 when he had sudden onset of chest pressure located anterior chest wall, substernal and nonradiating. Patient called his wife and went to [facility] [place] [facility] for evaluation and treatment. Initial EKG concerning for lateral ischemia with ST depression v2-v6 and I and AVR FABIO. Repeat ECG showed near resolution of ST segment changes in lateral leads and aVR. Chest pain had resolved. Trop 57-> 61-> 370. IV heparin infusion initiated and patient received MS04 in ER with some relief of chest pain. CXR performed at [facility] revealing calcified mitral area noted. Lungs remain clear. Echo performed 3/5/24 at [facility] revealing septal motion is abnormal consistent with prior cardiac surgery; LV systolic function is normal with EF 50%; mid inferior segment is abnormal.     Patient underwent LHC yesterday 3/5/24 with Dr. Jorge Luis Carvalho revealing 80% mCx lesion s/p POBA. Patient was transferred to [facility] for laser atherectomy and shockwave of Cx.     Plan:  1. CAD:  -plan for LHC +Laser l Atherectomy/ Shockwave of Cx  -patient seen and examined  -confirmed appropriate NPO duration  -ECG and Labs reviewed  -Continue DAPT therapy aspirin 81mg PO daily and Brilinta 90mg PO BID  -Continue statin therapy with atorvastatin 80mg PO QHS  -procedure discussed with patient; risks and benefits explained, questions answered  -consent obtained by attending IC  -0.9% NS 250cc bolus ordered to prevent RHONA    2. HTN:  Beta Blocker: Continue Metoprolol Succinate 25mg ER daily  RAASi: Continue Lisinopril 5mg PO daily  Other:    DASH Diet (to lower high blood pressure):  - Try to eat foods rich in potassium, calcium, magnesium, fiber, and protein  - Limit salt (sodium) intake to less than 1,500 mg per day  - Eat vegetables, fruits, and whole grains  - Include fat-free or low-fat dairy products, fish, poultry, beans, nuts, and vegetable oils  - Limit foods that are high in saturated or trans fat, such as fatty meats, full-fat dairy products, and tropical oils such as coconut, palm kernel, and palm oils  - Limit sugar-sweetened beverages and sweets    For more information: https://www.nhlbi.nih.gov/education/dash-eating-plan    3. HLD:  -Continue statin therapy with atorvastatin 40mg PO QHS  -Check lipid panel today  -Patient on Praulent 75mg every 2 weeks  -Continue ezetimibe; continue co-q10 upon discharge    4. DM:  -Pending A1C  -Continue levemir (60 units at home) in AM  -Insulin sliding scale   Impression:  73M PMH former smoker; CAD s/p 1v CABG (LIMA-LAD) >25 years ago; s/p multiple PCI's with stent placement; DM; HTN; HLD and recent hospitalization 2/15/24 for chest pain/ NSTEMI after several missed doses of Brilinta due to scheduled colonscopy. Patient developed acute onset of chest pain and underwent LHC at that time revealing widely patent LIMA-> LAD; known  of mRCA; occlusion of OM1; severe stenosis of the mLCx after Om1 bifurication treated with successful bifurication POBA to Lcx and Om1 systems. No new stents were placed due to prior brachytherapy in the Om1 and multiple layers of stents in both vessels.    Patient reports driving home from [place] on 3/4/24 when he had sudden onset of chest pressure located anterior chest wall, substernal and nonradiating. Patient called his wife and went to [facility] for evaluation and treatment. Initial EKG concerning for lateral ischemia with ST depression v2-v6 and I and AVR FABIO. Repeat ECG showed near resolution of ST segment changes in lateral leads and aVR. Chest pain had resolved. Trop 57-> 61-> 370. IV heparin infusion initiated and patient received MS04 in ER with some relief of chest pain. CXR performed at [facility] revealing calcified mitral area noted. Lungs remain clear. Echo performed 3/5/24 at [facility] revealing septal motion is abnormal consistent with prior cardiac surgery; LV systolic function is normal with EF 50%; mid inferior segment is abnormal.     Patient underwent LHC yesterday 3/5/24 with Dr. Jorge Luis Carvalho revealing 80% mCx lesion s/p POBA. Patient was transferred to [facility] for laser atherectomy and shockwave of Cx.     Plan:  1. CAD:  -plan for LHC +Laser l Atherectomy/ Shockwave of Cx  -patient seen and examined  -confirmed appropriate NPO duration  -ECG and Labs reviewed  -Continue DAPT therapy aspirin 81mg PO daily and Brilinta 90mg PO BID  -Continue statin therapy with atorvastatin 40mg PO QHS  -procedure discussed with patient; risks and benefits explained, questions answered  -consent obtained by attending IC  -0.9% NS 250cc bolus ordered to prevent RHONA    2. HTN:  Beta Blocker: Continue Metoprolol Succinate 25mg ER daily  RAASi: Continue Lisinopril 5mg PO daily  Other:    DASH Diet (to lower high blood pressure):  - Try to eat foods rich in potassium, calcium, magnesium, fiber, and protein  - Limit salt (sodium) intake to less than 1,500 mg per day  - Eat vegetables, fruits, and whole grains  - Include fat-free or low-fat dairy products, fish, poultry, beans, nuts, and vegetable oils  - Limit foods that are high in saturated or trans fat, such as fatty meats, full-fat dairy products, and tropical oils such as coconut, palm kernel, and palm oils  - Limit sugar-sweetened beverages and sweets    For more information: https://www.nhlbi.nih.gov/education/dash-eating-plan    3. HLD:  -Continue statin therapy with atorvastatin 40mg PO QHS  -Check lipid panel today  -home medications: Praulent 75mg injectable BID; Ezetimibe 10mg daily    4. DM:  -Pending A1C  -Continue levemir (60 units at home) in AM  -Insulin sliding scale   Impression:  73M PMH former smoker; CAD s/p 1v CABG (LIMA-LAD) >25 years ago; s/p multiple PCI's with stent placement; DM; HTN; HLD and recent hospitalization 2/15/24 for chest pain/ NSTEMI after several missed doses of Brilinta due to scheduled colonscopy. Patient developed acute onset of chest pain and underwent LHC at that time revealing widely patent LIMA-> LAD; known  of mRCA; occlusion of OM1; severe stenosis of the mLCx after Om1 bifurication treated with successful bifurication POBA to Lcx and Om1 systems. No new stents were placed due to prior brachytherapy in the Om1 and multiple layers of stents in both vessels.    Patient reports driving home from [employer] on 3/4/24 when he had sudden onset of chest pressure located anterior chest wall, substernal and nonradiating. Patient called his wife and went to [facility] for [place] and treatment. Initial EKG concerning for lateral ischemia with ST depression v2-v6 and I and AVR FABIO. Repeat ECG showed near resolution of ST segment changes in lateral leads and aVR. Chest pain had resolved. Trop 57-> 61-> 370. IV heparin infusion initiated and patient received MS04 in ER with some relief of chest pain. CXR performed at [facility] revealing calcified mitral area noted. Lungs remain clear. Echo performed 3/5/24 at [facility] revealing septal motion is abnormal consistent with prior cardiac surgery; LV systolic function is normal with EF 50%; mid inferior segment is abnormal.     Patient underwent LHC yesterday 3/5/24 with Dr. Jorge Luis Carvalho revealing 80% mCx lesion s/p POBA. Patient was transferred to [facility] for laser atherectomy and shockwave of Cx.     Plan:  1. CAD:  -plan for LHC +Laser l Atherectomy/ Shockwave of Cx  -patient seen and examined  -confirmed appropriate NPO duration  -ECG and Labs reviewed  -Continue DAPT therapy aspirin 81mg PO daily and Brilinta 90mg PO BID  -Continue statin therapy with atorvastatin 40mg PO QHS  -procedure discussed with patient; risks and benefits explained, questions answered  -consent obtained by attending IC  -0.9% NS 250cc bolus ordered to prevent RHONA    2. HTN:  Beta Blocker: Continue Metoprolol Succinate 75mg ER daily  RAASi: Continue Lisinopril 5mg PO daily  Other:    DASH Diet (to lower high blood pressure):  - Try to eat foods rich in potassium, calcium, magnesium, fiber, and protein  - Limit salt (sodium) intake to less than 1,500 mg per day  - Eat vegetables, fruits, and whole grains  - Include fat-free or low-fat dairy products, fish, poultry, beans, nuts, and vegetable oils  - Limit foods that are high in saturated or trans fat, such as fatty meats, full-fat dairy products, and tropical oils such as coconut, palm kernel, and palm oils  - Limit sugar-sweetened beverages and sweets    For more information: https://www.nhlbi.nih.gov/education/dash-eating-plan    3. HLD:  -Continue statin therapy with atorvastatin 40mg PO QHS  -Check lipid panel today  -home medications: Praulent 75mg injectable BID; Ezetimibe 10mg daily    4. DM:  -Pending A1C  -Continue levemir (60 units at home) in AM  -Insulin sliding scale

## 2024-03-06 NOTE — DISCHARGE NOTE PROVIDER - CARE PROVIDERS DIRECT ADDRESSES
[email or web address]

## 2024-03-06 NOTE — DISCHARGE NOTE PROVIDER - NSDCCPCAREPLAN_GEN_ALL_CORE_FT
PRINCIPAL DISCHARGE DIAGNOSIS  Diagnosis: CAD (coronary artery disease)  Assessment and Plan of Treatment: Coronary artery disease is the buildup of plaque in the arteries that supply oxygen-rich blood to your heart. Plaque causes a narrowing or blockage that could result in a heart attack. Symptoms include chest pain or discomfort, shortness of breath, dizziness, palpitations, fatigue or reduced exercise tolerance. .  Go to the ED with any acute onset of chest pain, palpitations, shortness of breath or dizziness. Do NOT miss a dose or stop taking your Aspirin 81mg once a day and Brilinta 90mg twice a day; these medications keep your stent open and prevent a heart attack. If anyone tells you to stop these medications, speak to your cardiologist immediately.  Managing risk factors will help keep your stent open and prevent future blockages, risk factors may include: high blood pressure, high cholesterol, diabetes, obesity, sedentary life style and smoking.    Your diet should be low in fat, cholesterol, salt and carbohydrates, increase fruits (caution if diabetic), vegetables and whole grains/fiber rich foods.   Take all your cardiac  medications as prescribed.    Exercise is a very important factor in heart health. Once your post procedure restrictions have passed, you should engage in heart healthy, aerobic exercise. Be sure to have clearance from your cardiologist. Cardiac rehab programs could be extremely beneficial and your cardiologist could help set this up.   Follow up with your cardiologist within 1-2 weeks after your procedure.   Call your cardiologist or our unit ([phone]) with any questions or concerns that may arise.      SECONDARY DISCHARGE DIAGNOSES  Diagnosis: HTN (hypertension)  Assessment and Plan of Treatment: -Continue to take your metoprolol succinate 75mg once a day  -Continue to take your lisinopril 5mg once a day   DASH Diet (to lower high blood pressure):  - Try to eat foods rich in potassium, calcium, magnesium, fiber, and protein  - Limit salt (sodium) intake to less than 1,500 mg per day  - Eat vegetables, fruits, and whole grains  - Include fat-free or low-fat dairy products, fish, poultry, beans, nuts, and vegetable oils  - Limit foods that are high in saturated or trans fat, such as fatty meats, full-fat dairy products, and tropical oils such as coconut, palm kernel, and palm oils  - Limit sugar-sweetened beverages and sweets  For more information: https://www.nhlbi.nih.gov/education/dash-eating-plan    Diagnosis: HLD (hyperlipidemia)  Assessment and Plan of Treatment: -Continue to take your atorvastatin 40mg once a day at bedtime  -Continue to take your Praulent 75mg once every 2 weeks  Continue with your cholesterol medications. Eat a heart healthy diet that is low in saturated fats and salt, and includes whole grains, fruits, vegetables and lean protein; exercise regularly (consult with your physician or cardiologist first); maintain a heart healthy weight; if you smoke - quit (A resource to help you stop smoking is the [facility] for Tobacco Control – phone number [phone].). Continue to follow with your primary physician or cardiologist.  Seek medical help for dizziness, Lightheadedness, Blurry vision, Headache, Chest pain, Shortness of breath      Diagnosis: DM (diabetes mellitus)  Assessment and Plan of Treatment: A1C 7.1  -Continue to take your insulin as previously prescribed.   With a diagnosis of diabetes comes a strict diet regimen to help control blood sugars by watching carbohydrate consumption. Carbohydrates, such as starches, fruits, dairy and starchy vegetables, is the food group that affects glucose levels in the body. Carefully monitoring carbohydrate intake is a main component of the diabetic diet; eating three meals each day that are roughly equivalent in size can help control blood sugars. A registered dietitian can help you plan a diet customized to your individual needs.       PRINCIPAL DISCHARGE DIAGNOSIS  Diagnosis: CAD (coronary artery disease)  Assessment and Plan of Treatment: Coronary artery disease is the buildup of plaque in the arteries that supply oxygen-rich blood to your heart. Plaque causes a narrowing or blockage that could result in a heart attack. Symptoms include chest pain or discomfort, shortness of breath, dizziness, palpitations, fatigue or reduced exercise tolerance. .  Go to the ED with any acute onset of chest pain, palpitations, shortness of breath or dizziness. Do NOT miss a dose or stop taking your Aspirin 81mg once a day and Brilinta (Ticagrelor) 90mg twice a day; these medications keep your stent open and prevent a heart attack. If anyone tells you to stop these medications, speak to your cardiologist immediately.  Managing risk factors will help keep your stent open and prevent future blockages, risk factors may include: high blood pressure, high cholesterol, diabetes, obesity, sedentary life style and smoking.    Your diet should be low in fat, cholesterol, salt and carbohydrates, increase fruits (caution if diabetic), vegetables and whole grains/fiber rich foods.   Take all your cardiac  medications as prescribed.    Exercise is a very important factor in heart health. Once your post procedure restrictions have passed, you should engage in heart healthy, aerobic exercise. Be sure to have clearance from your cardiologist. Cardiac rehab programs could be extremely beneficial and your cardiologist could help set this up.   Follow up with your cardiologist within 1-2 weeks after your procedure.   Call your cardiologist or our unit ([phone]) with any questions or concerns that may arise.      SECONDARY DISCHARGE DIAGNOSES  Diagnosis: HTN (hypertension)  Assessment and Plan of Treatment: -Continue to take your metoprolol succinate 75mg once a day  -Continue to take your lisinopril 5mg once a day   DASH Diet (to lower high blood pressure):  - Try to eat foods rich in potassium, calcium, magnesium, fiber, and protein  - Limit salt (sodium) intake to less than 1,500 mg per day  - Eat vegetables, fruits, and whole grains  - Include fat-free or low-fat dairy products, fish, poultry, beans, nuts, and vegetable oils  - Limit foods that are high in saturated or trans fat, such as fatty meats, full-fat dairy products, and tropical oils such as coconut, palm kernel, and palm oils  - Limit sugar-sweetened beverages and sweets  For more information: https://www.nhlbi.nih.gov/education/dash-eating-plan    Diagnosis: HLD (hyperlipidemia)  Assessment and Plan of Treatment: -Continue to take your atorvastatin 40mg once a day at bedtime  -Continue to take your Praulent 75mg once every 2 weeks  Continue with your cholesterol medications. Eat a heart healthy diet that is low in saturated fats and salt, and includes whole grains, fruits, vegetables and lean protein; exercise regularly (consult with your physician or cardiologist first); maintain a heart healthy weight; if you smoke - quit (A resource to help you stop smoking is the [facility] for Tobacco Control – phone number [phone].). Continue to follow with your primary physician or cardiologist.  Seek medical help for dizziness, Lightheadedness, Blurry vision, Headache, Chest pain, Shortness of breath      Diagnosis: DM (diabetes mellitus)  Assessment and Plan of Treatment: A1C 7.1  -Continue to take your insulin as previously prescribed.   With a diagnosis of diabetes comes a strict diet regimen to help control blood sugars by watching carbohydrate consumption. Carbohydrates, such as starches, fruits, dairy and starchy vegetables, is the food group that affects glucose levels in the body. Carefully monitoring carbohydrate intake is a main component of the diabetic diet; eating three meals each day that are roughly equivalent in size can help control blood sugars. A registered dietitian can help you plan a diet customized to your individual needs.

## 2024-03-06 NOTE — H&P ADULT - NSICDXPASTSURGICALHX_GEN_ALL_CORE_FT
Successful
PAST SURGICAL HISTORY:  S/P angioplasty KELLY POBA 10/15/19    S/P angioplasty with stent x 3 last stent 7 months ago with Dr. Thomas - OM1    S/P CABG x 3 25 years ago

## 2024-03-06 NOTE — ASU PATIENT PROFILE, ADULT - FALL HARM RISK - HARM RISK INTERVENTIONS

## 2024-03-06 NOTE — DISCHARGE NOTE PROVIDER - NSDCFUSCHEDAPPT_GEN_ALL_CORE_FT
Reji Thomas Physician [facility]  CARDIOLOGY [address]  Scheduled Appointment: 05/28/2024

## 2024-03-06 NOTE — H&P ADULT - NSHPLABSRESULTS_GEN_ALL_CORE
14.0   4.77  )-----------( 162      ( 06 Mar 2024 10:20 )             41.2       03-06    139  |  102  |  14.4  ----------------------------<  152<H>  4.3   |  25.0  |  0.82    Ca    8.6      06 Mar 2024 10:20  Mg     1.9     03-06    TPro  6.4<L>  /  Alb  3.7  /  TBili  1.5  /  DBili  x   /  AST  31  /  ALT  41<H>  /  AlkPhos  77  03-06    PT/INR - ( 06 Mar 2024 10:20 )   PT: 12.3 sec;   INR: 1.11 ratio         PTT - ( 06 Mar 2024 10:20 )  PTT:51.5 sec    Troponin T, High Sensitivity Result: 255: *  *  Rapid upward or downward changes in high-sensitivity troponin levels  suggest acute myocardial injury. Renal impairment may cause sustained  troponin elevations.  Normal: <6 - 14 ng/L  Indeterminate: 15-51 ng/L  Elevated: > 51 ng/L  See [email or web address]/test/[email or web address] on the [facility] intranet for more  information ng/L (03.06.24 @ 10:20)

## 2024-03-06 NOTE — CHART NOTE - NSCHARTNOTEFT_GEN_A_CORE
Left groin fem stop removed earlier at 3 pm. Left groin site ecchymotic which was there previously from last cath 2/2024. Left groin site soft, some mild swelling, no active bleeding. LLE remains acyanotic; warm to touch; motor/sensory function intact; 1+ left DP pulse. BP stable 126/60. Patient denies chest pain, chest pressure, shortness of breath, palpitations, dizziness, left groin pain, abdominal pain, thigh pain or back pain.

## 2024-03-06 NOTE — CHART NOTE - NSCHARTNOTEFT_GEN_A_CORE
Now s/p LHC via LFA with Dr. Ángel Cabrera, pt tolerated procedure well. Pt arrived to recovery in NAD and HDS, LFA access site stable s/p perclose device, no bleed/hematoma, LLE remains acyanotic; warm to touch; motor/sensory function intact; palpable 1+ left DP/PT pulse. left groin appears with soft tissue swelling post procedure, manual pressure held by RN x 10 minutes.   Intraprocedurally: Lidocaine 1% 10ml; Midazolam 2mg IV; Fentanyl 125mcg IV; Heparin 15,000 units IV; NS bolus 125 ml; Omnipaque 110ml  Findings:  Laser Atherectomy; Intravascular Lithotripsy and Cutting balloon of proximal Cx 80% stenosis into LM and OM1; NO STENT WAS DEPLOYED (PRELIMINARY VERBAL REPORT FROM DR CABRERA; PENDING OFFICIAL REPORT)   Post Cath EKG: Sinus Tachycardia with more prominent ST depressions in V3-V6. EKG reviewed with Dr. Cabrera. Patient asymptomatic. Will repeat EKG in 1 hour.   Post procedure, patient denies chest pain, chest pressure, shortness of breath, palpitations, dizziness, arm pain, jaw pain, indigestion or nausea or vomiting.    Plan:  1. CAD  -Formal cath report pending  -Post procedure management/monitoring per protocol  -Access site precautions  -MAINTAIN SUPINE POSITION WITH HOB FLAT AND LLE STRAIGHT X 3 hours. If left groin site stable, ok to raise HOB to 30 degrees in 3 hours at 1700 (5pm)  -bedrest x 4 hours. If left groin site stable, ok to ambulate in 4 hours at 1800 (6pm)  -Labs and EKG in am  -NS 0.9% 250ml/hr x 1 bolus: post procedure RHONA ppx   -Repeat ECG if any clinical indication or change on tele  -Continue current medical therapy  -Dual anti platelet therapy with aspirin 81mg PO daily/ brilinta 90mg PO BID  -Cont BB with Toprol 25mg po daily   -Cont statin therapy with Lipitor 40mg po qHS **  -Educated regarding strict adherence with DAPT   -Educated regarding post procedure management and care  -Discussed the importance of RF modification  -Cardiac rehab info provided/referral and communication to cardiac rehab completed  -F/U outpt in 1-2 weeks with Cardiologist Dr. Reji Thomas  -DISPO:  Plan for D/C in am if remains HDS, ECG and labs in am stable and without complications    2. HTN:  Beta Blocker: Continue Toprol 25mg ER daily  RAASi: Continue Lisinopril 5mg PO daily  Other:    DASH Diet (to lower high blood pressure):  - Try to eat foods rich in potassium, calcium, magnesium, fiber, and protein  - Limit salt (sodium) intake to less than 1,500 mg per day  - Eat vegetables, fruits, and whole grains  - Include fat-free or low-fat dairy products, fish, poultry, beans, nuts, and vegetable oils  - Limit foods that are high in saturated or trans fat, such as fatty meats, full-fat dairy products, and tropical oils such as coconut, palm kernel, and palm oils  - Limit sugar-sweetened beverages and sweets    For more information: https://www.nhlbi.nih.gov/education/dash-eating-plan    3. HLD:   Goal Non-HDL < 100, LDL < 70  Lipid Panel: Lipid Profile (03.06.24 @ 10:20)    Cholesterol: 60 mg/dL    Triglycerides, Serum: 96 mg/dL    HDL Cholesterol: 41 mg/dL    Non HDL Cholesterol: 19: Patients Atherosclerotic Cardiovascular Disease (ASCVD) Risk  Optimal Level (mg/dL)    LDL Cholesterol Calculated: SEE NOTE: UNABLE TO CALCULATE.  TEST REPEATED AND VERIFIED. mg/dL  Statin: Continue atorvastatin 40mg PO QHS  Other: **LDL unable to calculate - patient is on praulent 75mg injectable every 2 weeks    4. DM:  -A1C: 7.1  -Continue insulin sliding scale while in hospital  -levemir 30 units q AM while in hospital. Now s/p LHC via LFA with Dr. Ángel Cabrera, pt tolerated procedure well. Pt arrived to recovery in NAD and HDS, LFA access site stable s/p perclose device, no bleed/hematoma, LLE remains acyanotic; warm to touch; motor/sensory function intact; palpable 1+ left DP/PT pulse. left groin appears with soft tissue swelling post procedure, manual pressure held by RN x 10 minutes.   Intraprocedurally: Lidocaine 1% 10ml; Midazolam 2mg IV; Fentanyl 125mcg IV; Heparin 15,000 units IV; NS bolus 125 ml; Omnipaque 110ml  Findings:  Laser Atherectomy; Intravascular Lithotripsy and Cutting balloon of proximal Cx 80% stenosis into LM and OM1; NO STENT WAS DEPLOYED (PRELIMINARY VERBAL REPORT FROM DR CABRERA; PENDING OFFICIAL REPORT)   Post Cath EKG: Sinus Tachycardia with more prominent ST depressions in V3-V6. EKG reviewed with Dr. Cabrera. Patient asymptomatic. Will repeat EKG in 1 hour.   Post procedure, patient denies chest pain, chest pressure, shortness of breath, palpitations, dizziness, arm pain, jaw pain, indigestion or nausea or vomiting.    Plan:  1. CAD  -Formal cath report pending  -Post procedure management/monitoring per protocol  -Access site precautions  -MAINTAIN SUPINE POSITION WITH HOB FLAT AND LLE STRAIGHT X 3 hours. If left groin site stable, ok to raise HOB to 30 degrees in 3 hours at 1700 (5pm)  -bedrest x 4 hours. If left groin site stable, ok to ambulate in 4 hours at 1800 (6pm)  -Labs and EKG in am  -NS 0.9% 250ml/hr x 1 bolus: post procedure RHONA ppx   -Repeat ECG if any clinical indication or change on tele  -Continue current medical therapy  -Dual anti platelet therapy with aspirin 81mg PO daily/ brilinta 90mg PO BID  -Cont BB with Toprol 75mg po daily   -Cont statin therapy with Lipitor 40mg po qHS **  -Educated regarding strict adherence with DAPT   -Educated regarding post procedure management and care  -Discussed the importance of RF modification  -Cardiac rehab info provided/referral and communication to cardiac rehab completed  -F/U outpt in 1-2 weeks with Cardiologist Dr. Reji Thomas  -DISPO:  Plan for D/C in am if remains HDS, ECG and labs in am stable and without complications    2. HTN:  Beta Blocker: Continue Toprol 75mg ER daily  RAASi: Continue Lisinopril 5mg PO daily  Other:    DASH Diet (to lower high blood pressure):  - Try to eat foods rich in potassium, calcium, magnesium, fiber, and protein  - Limit salt (sodium) intake to less than 1,500 mg per day  - Eat vegetables, fruits, and whole grains  - Include fat-free or low-fat dairy products, fish, poultry, beans, nuts, and vegetable oils  - Limit foods that are high in saturated or trans fat, such as fatty meats, full-fat dairy products, and tropical oils such as coconut, palm kernel, and palm oils  - Limit sugar-sweetened beverages and sweets    For more information: https://www.nhlbi.nih.gov/education/dash-eating-plan    3. HLD:   Goal Non-HDL < 100, LDL < 70  Lipid Panel: Lipid Profile (03.06.24 @ 10:20)    Cholesterol: 60 mg/dL    Triglycerides, Serum: 96 mg/dL    HDL Cholesterol: 41 mg/dL    Non HDL Cholesterol: 19: Patients Atherosclerotic Cardiovascular Disease (ASCVD) Risk  Optimal Level (mg/dL)    LDL Cholesterol Calculated: SEE NOTE: UNABLE TO CALCULATE.  TEST REPEATED AND VERIFIED. mg/dL  Statin: Continue atorvastatin 40mg PO QHS  Other: **LDL unable to calculate - patient is on praulent 75mg injectable every 2 weeks    4. DM:  -A1C: 7.1  -Continue insulin sliding scale while in hospital  -levemir 30 units q AM while in hospital.

## 2024-03-06 NOTE — CHART NOTE - NSCHARTNOTEFT_GEN_A_CORE
Called to patient's bedside by cath lab RN due to left groin swelling with small amount of firmness noted. No active bleeding noted. Manual pressure was held x10 minutes twice with complete resolution of firmness. Left Groin now with swelling again and slight firmness. LLE remains acyanotic; warm to touch; motor/sensory function intact. 1+ left DP pulse. Fem stop applied to left groin at low pressure. LLE remain acyanotic with fem stop in place and palpable 1+ left DP pulse. Patient denies left groin pain, back pain, abdominal pain or left thigh pain.     PLAN:  -Low pressure fem stop to left groin x 1 hour.  -Please assess neurovascular status; groin checks and pulse checks q15 minutes while left fem stop in place  -Please notify IC NP immediately if patient develops any bleeding, hematoma to left groin or any change in color/temperature/sensation of LLE.

## 2024-03-06 NOTE — DISCHARGE NOTE PROVIDER - CARE PROVIDER_API CALL
Reji Thomas  Interventional Cardiology  [address] [place] [id number]  Phone: [phone]  Fax: [phone]  Established Patient  Follow Up Time: 1 week   Reji Thomas  Interventional Cardiology  [address] [place] [address]  Phone: [phone]  Fax: [phone]  Established Patient  Follow Up Time: 1 week    Ángel Rodríguez  Interventional Cardiology  [address] [place] [address]  Phone: [phone]  Fax: [phone]  Established Patient  Follow Up Time: 1 week

## 2024-03-07 ENCOUNTER — TRANSCRIPTION ENCOUNTER (OUTPATIENT)
Age: 74
End: 2024-03-07

## 2024-03-07 VITALS
HEART RATE: 88 BPM | DIASTOLIC BLOOD PRESSURE: 70 MMHG | TEMPERATURE: 98 F | RESPIRATION RATE: 18 BRPM | SYSTOLIC BLOOD PRESSURE: 112 MMHG | OXYGEN SATURATION: 97 %

## 2024-03-07 PROBLEM — Z87.891 PERSONAL HISTORY OF NICOTINE DEPENDENCE: Chronic | Status: ACTIVE | Noted: 2024-03-06

## 2024-03-07 LAB
ALBUMIN SERPL ELPH-MCNC: 3.3 G/DL — SIGNIFICANT CHANGE UP (ref 3.3–5.2)
ALP SERPL-CCNC: 68 U/L — SIGNIFICANT CHANGE UP (ref 40–120)
ALT FLD-CCNC: 30 U/L — SIGNIFICANT CHANGE UP
ANION GAP SERPL CALC-SCNC: 14 MMOL/L — SIGNIFICANT CHANGE UP (ref 5–17)
AST SERPL-CCNC: 31 U/L — SIGNIFICANT CHANGE UP
BILIRUB SERPL-MCNC: 1.6 MG/DL — SIGNIFICANT CHANGE UP (ref 0.4–2)
BUN SERPL-MCNC: 11.8 MG/DL — SIGNIFICANT CHANGE UP (ref 8–20)
CALCIUM SERPL-MCNC: 8.3 MG/DL — LOW (ref 8.4–10.5)
CHLORIDE SERPL-SCNC: 103 MMOL/L — SIGNIFICANT CHANGE UP (ref 96–108)
CO2 SERPL-SCNC: 22 MMOL/L — SIGNIFICANT CHANGE UP (ref 22–29)
CREAT SERPL-MCNC: 0.75 MG/DL — SIGNIFICANT CHANGE UP (ref 0.5–1.3)
EGFR: 96 ML/MIN/1.73M2 — SIGNIFICANT CHANGE UP
GLUCOSE BLDC GLUCOMTR-MCNC: 127 MG/DL — HIGH (ref 70–99)
GLUCOSE BLDC GLUCOMTR-MCNC: 134 MG/DL — HIGH (ref 70–99)
GLUCOSE SERPL-MCNC: 124 MG/DL — HIGH (ref 70–99)
HCT VFR BLD CALC: 37 % — LOW (ref 39–50)
HGB BLD-MCNC: 12.6 G/DL — LOW (ref 13–17)
MAGNESIUM SERPL-MCNC: 1.6 MG/DL — SIGNIFICANT CHANGE UP (ref 1.6–2.6)
MCHC RBC-ENTMCNC: 31.8 PG — SIGNIFICANT CHANGE UP (ref 27–34)
MCHC RBC-ENTMCNC: 34.1 GM/DL — SIGNIFICANT CHANGE UP (ref 32–36)
MCV RBC AUTO: 93.4 FL — SIGNIFICANT CHANGE UP (ref 80–100)
PLATELET # BLD AUTO: 148 K/UL — LOW (ref 150–400)
POTASSIUM SERPL-MCNC: 4 MMOL/L — SIGNIFICANT CHANGE UP (ref 3.5–5.3)
POTASSIUM SERPL-SCNC: 4 MMOL/L — SIGNIFICANT CHANGE UP (ref 3.5–5.3)
PROT SERPL-MCNC: 5.8 G/DL — LOW (ref 6.6–8.7)
RBC # BLD: 3.96 M/UL — LOW (ref 4.2–5.8)
RBC # FLD: 13.5 % — SIGNIFICANT CHANGE UP (ref 10.3–14.5)
SODIUM SERPL-SCNC: 139 MMOL/L — SIGNIFICANT CHANGE UP (ref 135–145)
WBC # BLD: 4.78 K/UL — SIGNIFICANT CHANGE UP (ref 3.8–10.5)
WBC # FLD AUTO: 4.78 K/UL — SIGNIFICANT CHANGE UP (ref 3.8–10.5)

## 2024-03-07 PROCEDURE — 99232 SBSQ HOSP IP/OBS MODERATE 35: CPT

## 2024-03-07 PROCEDURE — 93010 ELECTROCARDIOGRAM REPORT: CPT

## 2024-03-07 PROCEDURE — 86900 BLOOD TYPING SEROLOGIC ABO: CPT

## 2024-03-07 PROCEDURE — 85730 THROMBOPLASTIN TIME PARTIAL: CPT

## 2024-03-07 PROCEDURE — 82962 GLUCOSE BLOOD TEST: CPT

## 2024-03-07 PROCEDURE — 85025 COMPLETE CBC W/AUTO DIFF WBC: CPT

## 2024-03-07 PROCEDURE — 36415 COLL VENOUS BLD VENIPUNCTURE: CPT

## 2024-03-07 PROCEDURE — 86850 RBC ANTIBODY SCREEN: CPT

## 2024-03-07 PROCEDURE — 92972 PERQ TRLUML CORONRY LITHOTRP: CPT | Mod: LC

## 2024-03-07 PROCEDURE — C1761: CPT

## 2024-03-07 PROCEDURE — C1753: CPT

## 2024-03-07 PROCEDURE — 86901 BLOOD TYPING SEROLOGIC RH(D): CPT

## 2024-03-07 PROCEDURE — 85027 COMPLETE CBC AUTOMATED: CPT

## 2024-03-07 PROCEDURE — C1887: CPT

## 2024-03-07 PROCEDURE — 80053 COMPREHEN METABOLIC PANEL: CPT

## 2024-03-07 PROCEDURE — C1769: CPT

## 2024-03-07 PROCEDURE — C1725: CPT

## 2024-03-07 PROCEDURE — C1885: CPT

## 2024-03-07 PROCEDURE — 93005 ELECTROCARDIOGRAM TRACING: CPT

## 2024-03-07 PROCEDURE — 84484 ASSAY OF TROPONIN QUANT: CPT

## 2024-03-07 PROCEDURE — 80061 LIPID PANEL: CPT

## 2024-03-07 PROCEDURE — 92925: CPT | Mod: LC

## 2024-03-07 PROCEDURE — C1894: CPT

## 2024-03-07 PROCEDURE — 92924 PRQ TRLUML C ATHRC 1 ART&/BR: CPT | Mod: LC

## 2024-03-07 PROCEDURE — 83036 HEMOGLOBIN GLYCOSYLATED A1C: CPT

## 2024-03-07 PROCEDURE — 83735 ASSAY OF MAGNESIUM: CPT

## 2024-03-07 PROCEDURE — C1760: CPT

## 2024-03-07 PROCEDURE — 92978 ENDOLUMINL IVUS OCT C 1ST: CPT | Mod: LC

## 2024-03-07 PROCEDURE — 85610 PROTHROMBIN TIME: CPT

## 2024-03-07 RX ORDER — TICAGRELOR 90 MG/1
1 TABLET ORAL
Qty: 60 | Refills: 3
Start: 2024-03-07 | End: 2024-07-04

## 2024-03-07 RX ORDER — ACETAMINOPHEN 500 MG
2 TABLET ORAL
Qty: 0 | Refills: 0 | DISCHARGE
Start: 2024-03-07

## 2024-03-07 RX ORDER — MAGNESIUM SULFATE 500 MG/ML
2 VIAL (ML) INJECTION ONCE
Refills: 0 | Status: COMPLETED | OUTPATIENT
Start: 2024-03-07 | End: 2024-03-07

## 2024-03-07 RX ORDER — TICAGRELOR 90 MG/1
1 TABLET ORAL
Qty: 0 | Refills: 0 | DISCHARGE

## 2024-03-07 RX ADMIN — PANTOPRAZOLE SODIUM 40 MILLIGRAM(S): 20 TABLET, DELAYED RELEASE ORAL at 05:32

## 2024-03-07 RX ADMIN — Medication 75 MILLIGRAM(S): at 05:33

## 2024-03-07 RX ADMIN — TICAGRELOR 90 MILLIGRAM(S): 90 TABLET ORAL at 05:32

## 2024-03-07 RX ADMIN — Medication 25 GRAM(S): at 09:46

## 2024-03-07 RX ADMIN — LISINOPRIL 5 MILLIGRAM(S): 2.5 TABLET ORAL at 05:33

## 2024-03-07 RX ADMIN — Medication 81 MILLIGRAM(S): at 13:46

## 2024-03-07 NOTE — DISCHARGE NOTE NURSING/CASE MANAGEMENT/SOCIAL WORK - PATIENT PORTAL LINK FT
You can access the FollowMyHealth Patient Portal offered by [facility] by registering at the following website: [email or web address]. By joining [employer]’s FollowMyHealth portal, you will also be able to view your health information using other applications (apps) compatible with our system.

## 2024-03-07 NOTE — PROGRESS NOTE ADULT - SUBJECTIVE AND OBJECTIVE BOX
[facility]                                              INTERVENTIONAL CARDIOLOGY AT [facility]                                                   [address] [facility] [place] [place] [address]                                             Telephone: [phone]. Fax:[phone]                                                       INTERVENTIONAL CARDIOLOGY PROGRESS NOTE                                                                                             History obtained by: Patient and medical record  Community Cardiologist: Dr. Reji Thomas  Reason for Consultation: Evaluation for cardiac catheterization  Available pt records reviewed: Yes [ x ] No [  ]    Chief complaint:    Patient is a 73y old  Male who presents with a chief complaint of NSTEMI (06 Mar 2024 14:13)      HPI:  73M PMH CAD s/p 1v CABG (LIMA-LAD) >25 years ago; s/p multiple PCI's with stent placement; DM; HTN; HLD and recent hospitalization 2/15/24 for chest pain/ NSTEMI after several missed doses of Brilinta due to scheduled colonscopy. Patient developed acute onset of chest pain and underwent LHC at that time revealing widely patent LIMA-> LAD; known  of mRCA; occlusion of OM1; severe stenosis of the mLCx after Om1 bifurication treated with successful bifurication POBA to Lcx and Om1 systems. No new stents were placed due to prior brachytherapy in the Om1 and multiple layers of stents in both vessels.    Patient reports driving home from [place] on 3/4/24 when he had sudden onset of chest pressure located anterior chest wall, substernal and nonradiating. Patient called his wife and went to [facility] for evaluation and treatment. Initial EKG concerning for lateral ischemia with ST depression v2-v6 and I and AVR FABIO. Repeat ECG showed near resolution of ST segment changes in lateral leads and aVR. Chest pain had resolved. Trop 57-> 61-> 370. IV heparin infusion initiated and patient received MS04 in ER with some relief of chest pain. CXR performed at [facility] revealing calcified mitral area noted. Lungs remain clear. Echo performed 3/5/24 at [facility] revealing septal motion is abnormal consistent with prior cardiac surgery; LV systolic function is normal with EF 50%; mid inferior segment is abnormal.     Patient underwent LHC yesterday 3/5/24 with Dr. Jorge Luis Carvalho revealing 80% mCx lesion s/p POBA. Patient was transferred to [facility] for laser atherectomy and shockwave of Cx. Patient received to [facility] CCL with IV heparin infusion infusing at 900 units/hr. Patient denies any recurrent chest pain, chest pressure, shortness of breath, palpitations, dizziness, syncope, orthopnea, leg edema, arm pain, jaw pain or indigestion.     RRA site from 3/5 lhc procedure with ecchymosis; no active bleeding, no hematoma. Right hand with petechiae. h/h PLT count stable. Patient denies right pain. RUE/ Right hand remains acyanotic; warm to touch; motor/sensory function intact; palpable radial pulse.       Symptoms:        Angina (Class): IV       Ischemic Symptoms: chest pain    Heart Failure:        Systolic/Diastolic/Combined: n/a       NYHA Class (within 2 weeks): n/a    Assessment of LVEF (Must be within 6 months):       EF: 50%       Assessed by: TTE        Date:  3/5/24    Prior Cardiac Interventions:       PCI's (Date, Stents, Vessels): 2/15/24 LHC at [facility] with Dr. Rodríguez:  LHC at that time revealing widely patent LIMA-> LAD; known  of mRCA; occlusion of OM1; severe stenosis of the mLCx after Om1 bifurication treated with successful bifurication POBA to Lcx and Om1 systems. No new stents were placed due to prior brachytherapy in the Om1 and multiple layers of stents in both vessels.    3/5/24 [facility] with Dr. Carvalho (PRELIMINARY VERBAL REPORT):  revealing 80% mCx lesion s/p POBA.         CABG (Date, Grafts): SANCHEZ-LAD (27 years old)    Echo (Date, Findings):  Echo performed 3/5/24 at [facility] revealing septal motion is abnormal consistent with prior cardiac surgery; LV systolic function is normal with EF 50%; mid inferior segment is abnormal.     Risk Assessments:  ASA: 3  Mallampati: 2  GFR: 0.82  Cr: 94  BRA: 0.7%    Associated Risk Factors:        Cerebrovascular Disease: N/A       Chronic Lung Disease: N/A       Peripheral Arterial Disease: N/A       Chronic Kidney Disease (if yes, what is GFR): N/A       Uncontrolled Diabetes (if yes, what is HgbA1C or FBS): N/A       Poorly Controlled Hypertension (if yes, what is SBP): N/A       Morbid Obesity (if yes, what is BMI): N/A       History of Recent Ventricular Arrhythmia: N/A       Inability to Ambulate Safely: N/A       Need for Therapeutic Anticoagulation: N/A       Antiplatelet or Contrast Allergy: N/A     (06 Mar 2024 10:19)      PAST MEDICAL HISTORY  CAD, multiple vessel    Diabetes mellitus    HTN (hypertension)    HLD (hyperlipidemia)    Former smoker      PAST SURGICAL HISTORY  S/P CABG x 3    S/P angioplasty with stent    S/P angioplasty      FAMILY HISTORY:  FH: myocardial infarction      HOME MEDICATIONS:  acetaminophen 325 mg oral tablet: 2 tab(s) orally every 6 hours As needed Mild Pain (1 - 3) (07 Mar 2024 10:59)  aspirin 81 mg oral delayed release tablet: 1 tab(s) orally once a day (06 Mar 2024 10:29)  atorvastatin 40 mg oral tablet: 1 tab(s) orally once a day (at bedtime) (06 Mar 2024 11:01)  ezetimibe 10 mg oral tablet: 1 tab(s) orally once a day (06 Mar 2024 10:29)  Farxiga 10 mg oral tablet: 1 tab(s) orally once a day (06 Mar 2024 10:24)  Levemir FlexPen 100 units/mL subcutaneous solution: 60 unit(s) subcutaneous once a day before breakfast (06 Mar 2024 11:03)  lisinopril 10 mg oral tablet: 0.5 tab(s) orally once a day (06 Mar 2024 11:03)  metoprolol succinate 25 mg oral tablet, extended release: 3 tab(s) orally once a day (06 Mar 2024 14:22)  NovoLOG FlexPen 100 units/mL injectable solution: injectable sliding scale (06 Mar 2024 10:27)  Omega-3 350 mg oral capsule: 2 cap(s) orally 2 times a day (06 Mar 2024 10:29)  Ozempic 8 mg/3 mL (2 mg dose) subcutaneous solution: 2 milligram(s) subcutaneously once a week (06 Mar 2024 10:30)  Praluent Pen 75 mg/mL subcutaneous solution: 75 milligram(s) subcutaneously every 2 weeks (06 Mar 2024 11:04)      CURRENT CARDIAC MEDICATIONS:  lisinopril 5 milliGRAM(s) Oral daily  metoprolol succinate ER 75 milliGRAM(s) Oral daily      ALLERGIES:   No Known Allergies      REVIEW OF SYMPTOMS:   CONSTITUTIONAL: No fever, no chills, no weight loss, no weight gain, no fatigue   CARDIOVASCULAR: DENIES ACTIVE CHEST PAIN, CHEST PRESSURE, SHORTNESS OF BREATH, PALPITATIONS, DIZZINESS, ORTHOPNEA OR LEG EDEMA OVERNIGHT  RESPIRATORY: no Shortness of breath, no cough, no wheezing  : No dysuria, no hematuria   GI: No dark color stool, no nausea, no diarrhea, no constipation, no abdominal pain   NEURO: No headache, no slurred speech   ALL OTHER REVIEW OF SYSTEMS ARE NEGATIVE.    VITAL SIGNS:  T(C): 36.7 (03-07-24 @ 08:00), Max: 36.9 (03-07-24 @ 00:12)  T(F): 98.1 (03-07-24 @ 08:00), Max: 98.4 (03-07-24 @ 00:12)  HR: 96 (03-07-24 @ 08:00) (93 - 112)  BP: 118/70 (03-07-24 @ 08:00) (111/49 - 139/78)  RR: 18 (03-07-24 @ 08:00) (16 - 20)  SpO2: 99% (03-07-24 @ 08:00) (92% - 99%)    INTAKE AND OUTPUT:     03-06 @ 07:01  -  03-07 @ 07:00  --------------------------------------------------------  IN: 500 mL / OUT: 400 mL / NET: 100 mL        PHYSICAL EXAM:  Constitutional: Comfortable . No acute distress.   HEENT: Atraumatic and normocephalic , neck is supple . no JVD. No carotid bruit.  CNS: A&Ox3. No focal deficits.   Respiratory: CTAB, unlabored   Cardiovascular: RRR normal s1 s2. No murmur. No rubs or gallop.  Gastrointestinal: Soft, non-tender. +Bowel sounds.   Extremities: right forearm/ wrist ecchymotic from [facility] on 3/5/24. No active bleeding or hematoma. Site soft. RUE remains acyanotic; warm to touch; motor/sensory function intact; 2+ right radial pulse. Left groin with ecchymosis from symphis pubis to lateral hip (both old and new bruising from prior [facility]); bruising into penis and scrotum. LLE remains acyanotic; warm to touch; motor/sensory function intact; palpable 1+ left DP pulse.   Psychiatric: Calm . no agitation.   Skin: ecchymosis noted to right hand; right forearm/wrist unchanged from yesterday; left large area of axillary bruising (unchanged)_; left groin bruising; no active bleeding or hematoma; bruising into scrotum. Patient denies left groin pain, tenderness, left thigh pain; abdominal pain or back pain.     LABS:                            12.6   4.78  )-----------( 148      ( 07 Mar 2024 06:19 )             37.0     03-07    139  |  103  |  11.8  ----------------------------<  124<H>  4.0   |  22.0  |  0.75    Ca    8.3<L>      07 Mar 2024 06:19  Mg     1.6     03-07    TPro  5.8<L>  /  Alb  3.3  /  TBili  1.6  /  DBili  x   /  AST  31  /  ALT  30  /  AlkPhos  68  03-07    PT/INR - ( 06 Mar 2024 10:20 )   PT: 12.3 sec;   INR: 1.11 ratio         PTT - ( 06 Mar 2024 10:20 )  PTT:51.5 sec  Urinalysis Basic - ( 07 Mar 2024 06:19 )    Color: x / Appearance: x / SG: x / pH: x  Gluc: 124 mg/dL / Ketone: x  / Bili: x / Urobili: x   Blood: x / Protein: x / Nitrite: x   Leuk Esterase: x / RBC: x / WBC x   Sq Epi: x / Non Sq Epi: x / Bacteria: x    ECG: SR with no acute ischemic changes  Prior ECG: Yes [X  ] No [  ]

## 2024-03-07 NOTE — DISCHARGE NOTE NURSING/CASE MANAGEMENT/SOCIAL WORK - NSDCFUADDAPPT_GEN_ALL_CORE_FT
PT DECLINED [facility] to BED.  PT AGREEABLE to [facility].  T AGREEABLE with OUTPT. F/U APPTS.  YOLLOW FOLDER PROVIDED with INSTUCTIONS.      pcp—office will set up      Cardio---Appointment made with  Dr. Thomas   on   3/28    at 3:15  .If you are unable to attend your pre-scheduled appointment,  please contact the office directly at [phone] to [address] [place] [address] reschedule.

## 2024-03-07 NOTE — PROGRESS NOTE ADULT - ASSESSMENT
POD 1 C with Dr. Hooper yesterday 3/7 via LFA revealing: OM1 70% ISR; Cx 70% stenosis s/p Successful laser atherectomy and shockwave angioplasty to the OM1 and pLCX.  Successful angioplasty and shockwave to the mLCX - unfortunately, laser catheters were not able to be advanced into the mLCX.    EKG reviewed with Dr. Rodríguez SR with no acute ischemic changes.   Left groin dressing removed. No active bleeding. +ecchymosis to left groin from symphis pubis to lateral hip (both old and new bruising) left groin site asymptomatic; Patient denies pain or tenderness to left groin; no pain to left thigh or left back/ abdomen. LLE remains acyanotic; warm to touch; motor/sensory function intact and palpable left DP pulse. CBC remains stable from last night. He reports he is tolerating ambulation without any anginal symptoms or groin pain.   Patient denies chest pain, chest pressure, shortness of breath, palpitations, dizziness, orthopnea, indigestion, arm pain or jaw pain overnight. Patient is requesting to be discharged home today.     Plan:  1. CAD  -Post procedure management/monitoring per protocol  -Access site precautions discussed with patient  -Labs and EKG in am were reviewed  -Repeat ECG if any clinical indication or change on tele  -Continue current medical therapy  -Dual anti platelet therapy with aspirin 81mg PO daily/ brilinta 90mg PO BID  -Cont BB with Toprol 75mg po daily   -Cont statin therapy with Lipitor 40mg po qHS   -Educated regarding strict adherence with DAPT   -Educated regarding post procedure management and care  -Discussed the importance of RF modification  -Cardiac rehab info provided/referral and communication to cardiac rehab completed  -F/U outpt in 1 weeks with Cardiologist Dr. Reji Thomas and Dr. Ángel Rodríguez  -DISPO:  Plan for D/C today. DIscussed with Dr. Rodríguez    2. HTN:  Beta Blocker: Continue Toprol 75mg ER daily  RAASi: Continue Lisinopril 5mg PO daily  Other:    DASH Diet (to lower high blood pressure):  - Try to eat foods rich in potassium, calcium, magnesium, fiber, and protein  - Limit salt (sodium) intake to less than 1,500 mg per day  - Eat vegetables, fruits, and whole grains  - Include fat-free or low-fat dairy products, fish, poultry, beans, nuts, and vegetable oils  - Limit foods that are high in saturated or trans fat, such as fatty meats, full-fat dairy products, and tropical oils such as coconut, palm kernel, and palm oils  - Limit sugar-sweetened beverages and sweets    For more information: https://www.nhlbi.nih.gov/education/dash-eating-plan    3. HLD:   Goal Non-HDL < 100, LDL < 70  Lipid Panel: Lipid Profile (03.06.24 @ 10:20)    Cholesterol: 60 mg/dL    Triglycerides, Serum: 96 mg/dL    HDL Cholesterol: 41 mg/dL    Non HDL Cholesterol: 19: Patients Atherosclerotic Cardiovascular Disease (ASCVD) Risk  Optimal Level (mg/dL)    LDL Cholesterol Calculated: SEE NOTE: UNABLE TO CALCULATE.  TEST REPEATED AND VERIFIED. mg/dL  Statin: Continue atorvastatin 40mg PO QHS  Other: **LDL unable to calculate - patient is on praulent 75mg injectable every 2 weeks    4. DM:  -A1C: 7.1  -Follow up with endocrinologist as outpatient  -COntinue insulin as previously prescribed

## 2024-03-07 NOTE — DISCHARGE NOTE NURSING/CASE MANAGEMENT/SOCIAL WORK - NSDCPEFALRISK_GEN_ALL_CORE
For information on Fall & Injury Prevention, visit: [email or web address] OR  [email or web address] OR  https://www.cdc.gov/steadi/patient.html

## 2024-03-11 ENCOUNTER — APPOINTMENT (OUTPATIENT)
Dept: CARDIOLOGY | Facility: CLINIC | Age: 74
End: 2024-03-11
Payer: MEDICARE

## 2024-03-11 VITALS
BODY MASS INDEX: 29.55 KG/M2 | WEIGHT: 195 LBS | OXYGEN SATURATION: 98 % | DIASTOLIC BLOOD PRESSURE: 70 MMHG | HEART RATE: 89 BPM | SYSTOLIC BLOOD PRESSURE: 120 MMHG | HEIGHT: 68 IN

## 2024-03-11 PROCEDURE — 99215 OFFICE O/P EST HI 40 MIN: CPT

## 2024-03-11 PROCEDURE — G2211 COMPLEX E/M VISIT ADD ON: CPT

## 2024-03-11 RX ORDER — QUINIDINE SULFATE 200 MG
TABLET ORAL
Refills: 0 | Status: DISCONTINUED | COMMUNITY
End: 2024-03-11

## 2024-03-11 NOTE — PHYSICAL EXAM
[Well Developed] : well developed [Well Nourished] : well nourished [No Acute Distress] : no acute distress [Normal Conjunctiva] : normal conjunctiva [Normal Venous Pressure] : normal venous pressure [No Carotid Bruit] : no carotid bruit [Normal S1, S2] : normal S1, S2 [No Murmur] : no murmur [No Rub] : no rub [No Gallop] : no gallop [No Respiratory Distress] : no respiratory distress  [Clear Lung Fields] : clear lung fields [Good Air Entry] : good air entry [Soft] : abdomen soft [Non Tender] : non-tender [No Masses/organomegaly] : no masses/organomegaly [Normal Bowel Sounds] : normal bowel sounds [Normal Gait] : normal gait [No Edema] : no edema [No Cyanosis] : no cyanosis [No Clubbing] : no clubbing [No Varicosities] : no varicosities [No Rash] : no rash [No Skin Lesions] : no skin lesions [Moves all extremities] : moves all extremities [Normal Speech] : normal speech [No Focal Deficits] : no focal deficits [Alert and Oriented] : alert and oriented [Normal memory] : normal memory [de-identified] : Femoral access site is c/d/i without hematoma.  2+ distal pulses

## 2024-03-11 NOTE — HISTORY OF PRESENT ILLNESS
[FreeTextEntry1] : 73M w/ PMH of CAD s/p CABG and mutli-vessel stenting, HLD, HTN, presents for routine follow up after LM stenting in the setting of NSTEMI.  He returns to the clinic today without chest pains, SOB and LE edema.  His left radial wrist site is healing well and without hematoma or bruising.  3/11/2024: Since our last visit he had stent thrombosis after stopping Brilinta in anticipation of his colonoscopy.  He underwent repeat ballooning and AngioSculpt to his circumflex and OM1 system.  About 2 weeks later he had significant angina and NSTEMI and was brought back to the Cath Lab where his circumflex restenosed.  He was transferred over to [facility] where I performed laser atherectomy of his left main left circumflex and OM1.  I was unable to pass the laser catheter into the mid circumflex.  He returns today feeling well without significant angina.  He feels beat up from the rash of procedures that he has had recently.  Groin site is healing well without issues.

## 2024-03-11 NOTE — ED CLERICAL - BED REQUESTED
14-Oct-2019 23:57 Patient ambulatory from ED at this time. AVS provided and discussed with patient. All questions answered. Patient verbalizes understanding of discharge instructions.

## 2024-03-11 NOTE — REASON FOR VISIT
[Hyperlipidemia] : hyperlipidemia [Coronary Artery Disease] : coronary artery disease [Hypertension] : hypertension [FreeTextEntry3] : Dr. Schulte [Spouse] : spouse

## 2024-03-11 NOTE — DISCUSSION/SUMMARY
[Patient] : the patient [FreeTextEntry1] : 73M w/ PMH as above presenting for routine follow up after recent PCI.  1. CAD - DAPT -  ASA + brilinta 1 year minimum.  Continue metoprolol succinate 75 mg p.o. daily.  Consider drug-eluting balloon when able.  Other options include repeat brachytherapy to the circumflex system only versus repeat CABG with bypass of the OM1 LPL and RPDA systems.  He is not very interested in having a repeat bypass and would like to exhaust all possible PCI options prior to considering repeat surgery. 2. HLD -continue Repatha, lipitor 40 mg PO daily and zetia 10 mg PO daily 3. HTN - controlled on toprol 75 mg p.o. daily, Farxiga 10 mg p.o. daily.  RTC 1 month with Dr. Thomas.  Cardiac rehab if feeling well. [___ Month(s)] : in [unfilled] month(s)

## 2024-03-25 ENCOUNTER — TRANSCRIPTION ENCOUNTER (OUTPATIENT)
Age: 74
End: 2024-03-25

## 2024-03-28 ENCOUNTER — APPOINTMENT (OUTPATIENT)
Dept: CARDIOLOGY | Facility: CLINIC | Age: 74
End: 2024-03-28

## 2024-04-08 ENCOUNTER — NON-APPOINTMENT (OUTPATIENT)
Age: 74
End: 2024-04-08

## 2024-04-11 ENCOUNTER — APPOINTMENT (OUTPATIENT)
Dept: CARDIOLOGY | Facility: CLINIC | Age: 74
End: 2024-04-11
Payer: MEDICARE

## 2024-04-11 VITALS
WEIGHT: 200 LBS | DIASTOLIC BLOOD PRESSURE: 70 MMHG | BODY MASS INDEX: 30.31 KG/M2 | HEART RATE: 85 BPM | SYSTOLIC BLOOD PRESSURE: 120 MMHG | OXYGEN SATURATION: 99 % | HEIGHT: 68 IN

## 2024-04-11 DIAGNOSIS — R06.00 DYSPNEA, UNSPECIFIED: ICD-10-CM

## 2024-04-11 PROCEDURE — 99215 OFFICE O/P EST HI 40 MIN: CPT

## 2024-04-11 PROCEDURE — G2211 COMPLEX E/M VISIT ADD ON: CPT

## 2024-04-11 NOTE — DISCUSSION/SUMMARY
[Patient] : the patient [___ Month(s)] : in [unfilled] month(s) [FreeTextEntry1] : 73M w/ PMH as above presenting for routine follow up after recent PCI.  1. CAD - DAPT -  ASA + brilinta 1 year minimum.  Continue metoprolol succinate 75 mg p.o. daily.  Consider drug-eluting balloon when able.  Other options include repeat brachytherapy to the circumflex system only versus repeat CABG with bypass of the OM1 LPL and RPDA systems.  He is not very interested in having a repeat bypass and would like to exhaust all possible PCI options prior to considering repeat surgery. 2. HLD -continue Repatha, lipitor 40 mg PO daily and zetia 10 mg PO daily 3. HTN - controlled on toprol 75 mg p.o. daily, Farxiga 10 mg p.o. daily.  Cardiac rehab if feeling well. f/u in 2 month

## 2024-04-11 NOTE — REASON FOR VISIT
[Hyperlipidemia] : hyperlipidemia [Hypertension] : hypertension [Coronary Artery Disease] : coronary artery disease [Spouse] : spouse [FreeTextEntry3] : Dr. Schulte [FreeTextEntry1] : I saw this 73M in f/u on 04/11/24   w/ PMH of CAD s/p CABG and mutli-vessel stenting, HLD, HTN, presents for routine follow up after LM stenting in the setting of NSTEMI.  He returns to the clinic today without chest pains, SOB and LE edema.  His left radial wrist site is healing well and without hematoma or bruising.  3/11/2024: Since our last visit he had stent thrombosis after stopping Brilinta in anticipation of his colonoscopy.  He underwent repeat ballooning and AngioSculpt to his circumflex and OM1 system.  About 2 weeks later he had significant angina and NSTEMI and was brought back to the Cath Lab where his circumflex restenosed.  He was transferred over to [facility] where I performed laser atherectomy of his left main left circumflex and OM1.  I was unable to pass the laser catheter into the mid circumflex.  He returns today feeling well without significant angina.  He feels beat up from the rash of procedures that he has had recently.  Groin site is healing well without issues. He has started to resume activities and finds he has exertional dyspnea.  This may be deconditioning.

## 2024-04-11 NOTE — PHYSICAL EXAM
[Well Developed] : well developed [Well Nourished] : well nourished [No Acute Distress] : no acute distress [Normal Conjunctiva] : normal conjunctiva [Normal Venous Pressure] : normal venous pressure [No Carotid Bruit] : no carotid bruit [Normal S1, S2] : normal S1, S2 [No Murmur] : no murmur [No Rub] : no rub [No Gallop] : no gallop [Clear Lung Fields] : clear lung fields [Good Air Entry] : good air entry [No Respiratory Distress] : no respiratory distress  [Soft] : abdomen soft [Non Tender] : non-tender [No Masses/organomegaly] : no masses/organomegaly [Normal Bowel Sounds] : normal bowel sounds [Normal Gait] : normal gait [No Edema] : no edema [No Cyanosis] : no cyanosis [No Clubbing] : no clubbing [No Varicosities] : no varicosities [No Rash] : no rash [No Skin Lesions] : no skin lesions [Moves all extremities] : moves all extremities [No Focal Deficits] : no focal deficits [Normal Speech] : normal speech [Alert and Oriented] : alert and oriented [Normal memory] : normal memory [de-identified] : Femoral access site is c/d/i without hematoma.  2+ distal pulses

## 2024-04-11 NOTE — HISTORY OF PRESENT ILLNESS
[FreeTextEntry1] : he has no chest pain He has some exertional shortness of breath He has no palpitations He has no syncope He is neurologically intact He has no edema He has no GI symptoms

## 2024-05-21 ENCOUNTER — RX RENEWAL (OUTPATIENT)
Age: 74
End: 2024-05-21

## 2024-05-21 RX ORDER — ATORVASTATIN CALCIUM 40 MG/1
40 TABLET, FILM COATED ORAL
Qty: 90 | Refills: 1 | Status: ACTIVE | COMMUNITY
Start: 2019-04-29 | End: 1900-01-01

## 2024-06-06 ENCOUNTER — APPOINTMENT (OUTPATIENT)
Dept: CARDIOLOGY | Facility: CLINIC | Age: 74
End: 2024-06-06
Payer: MEDICARE

## 2024-06-06 VITALS
WEIGHT: 205 LBS | OXYGEN SATURATION: 95 % | SYSTOLIC BLOOD PRESSURE: 130 MMHG | HEIGHT: 68 IN | DIASTOLIC BLOOD PRESSURE: 58 MMHG | HEART RATE: 93 BPM | BODY MASS INDEX: 31.07 KG/M2

## 2024-06-06 DIAGNOSIS — Z95.5 PRESENCE OF CORONARY ANGIOPLASTY IMPLANT AND GRAFT: ICD-10-CM

## 2024-06-06 DIAGNOSIS — Z95.1 PRESENCE OF AORTOCORONARY BYPASS GRAFT: ICD-10-CM

## 2024-06-06 DIAGNOSIS — E78.00 PURE HYPERCHOLESTEROLEMIA, UNSPECIFIED: ICD-10-CM

## 2024-06-06 DIAGNOSIS — I21.4 NON-ST ELEVATION (NSTEMI) MYOCARDIAL INFARCTION: ICD-10-CM

## 2024-06-06 DIAGNOSIS — Z92.3 PERSONAL HISTORY OF IRRADIATION: ICD-10-CM

## 2024-06-06 DIAGNOSIS — I25.10 ATHEROSCLEROTIC HEART DISEASE OF NATIVE CORONARY ARTERY W/OUT ANGINA PECTORIS: ICD-10-CM

## 2024-06-06 DIAGNOSIS — E11.9 TYPE 2 DIABETES MELLITUS W/OUT COMPLICATIONS: ICD-10-CM

## 2024-06-06 DIAGNOSIS — I10 ESSENTIAL (PRIMARY) HYPERTENSION: ICD-10-CM

## 2024-06-06 PROCEDURE — 99214 OFFICE O/P EST MOD 30 MIN: CPT

## 2024-06-06 PROCEDURE — G2211 COMPLEX E/M VISIT ADD ON: CPT

## 2024-06-06 RX ORDER — DAPAGLIFLOZIN 10 MG/1
10 TABLET, FILM COATED ORAL
Refills: 0 | Status: ACTIVE | COMMUNITY

## 2024-06-06 RX ORDER — DAPAGLIFLOZIN 10 MG/1
10 TABLET, FILM COATED ORAL DAILY
Refills: 0 | Status: DISCONTINUED | COMMUNITY
End: 2024-06-06

## 2024-06-06 NOTE — DISCUSSION/SUMMARY
[Patient] : the patient [___ Month(s)] : in [unfilled] month(s) [FreeTextEntry1] : 73M w/ PMH as above presenting for routine follow up   1. CAD - DAPT -  ASA + brilinta 1 year minimum.  Continue metoprolol succinate 75 mg p.o. daily.  Consider drug-eluting balloon when able.  Other options include repeat brachytherapy to the circumflex system only versus repeat CABG with bypass of the OM1 LPL and RPDA systems.  He is not very interested in having a repeat bypass and would like to exhaust all possible PCI options prior to considering repeat surgery. 2. HLD -continue Repatha, lipitor 40 mg PO daily and zetia 10 mg PO daily 3. HTN - controlled on toprol 75 mg p.o. daily, Farxiga 10 mg p.o. daily.  Cardiac rehab if feeling well. f/u in 3 month

## 2024-06-06 NOTE — REASON FOR VISIT
[Hyperlipidemia] : hyperlipidemia [Hypertension] : hypertension [Coronary Artery Disease] : coronary artery disease [Spouse] : spouse [FreeTextEntry3] : Dr. Schulte [FreeTextEntry1] : I saw this 73M in f/u on 06/06/24   w/ PMH of CAD s/p CABG and mutli-vessel stenting, HLD, HTN, presents for routine follow up after LM stenting in the setting of NSTEMI.  He returns to the clinic today without chest pains, SOB and LE edema.   3/11/2024: Since our last visit he had stent thrombosis after stopping Brilinta in anticipation of his colonoscopy.  He underwent repeat ballooning and AngioSculpt to his circumflex and OM1 system.  About 2 weeks later he had significant angina and NSTEMI and was brought back to the Cath Lab where his circumflex restenosed.  He was transferred over to [facility] where I performed laser atherectomy of his left main left circumflex and OM1.  I was unable to pass the laser catheter into the mid circumflex.  He returns today feeling well without significant angina.  He feels beat up from the rash of procedures that he has had recently.  Groin site is healing well without issues. He has started to resume activities and finds he has exertional dyspnea.  This may be deconditioning. Started a walking program and has reached the point where he walks a mile a day without any issues.  He has had a rare occasion of chest discomfort on severe exertion.

## 2024-06-06 NOTE — PHYSICAL EXAM
[Well Developed] : well developed [Well Nourished] : well nourished [No Acute Distress] : no acute distress [Normal Conjunctiva] : normal conjunctiva [Normal Venous Pressure] : normal venous pressure [No Carotid Bruit] : no carotid bruit [Normal S1, S2] : normal S1, S2 [No Murmur] : no murmur [No Rub] : no rub [No Gallop] : no gallop [Clear Lung Fields] : clear lung fields [Good Air Entry] : good air entry [No Respiratory Distress] : no respiratory distress  [Soft] : abdomen soft [Non Tender] : non-tender [No Masses/organomegaly] : no masses/organomegaly [Normal Bowel Sounds] : normal bowel sounds [Normal Gait] : normal gait [No Edema] : no edema [No Cyanosis] : no cyanosis [No Clubbing] : no clubbing [No Varicosities] : no varicosities [No Rash] : no rash [No Skin Lesions] : no skin lesions [Moves all extremities] : moves all extremities [No Focal Deficits] : no focal deficits [Normal Speech] : normal speech [Alert and Oriented] : alert and oriented [Normal memory] : normal memory [de-identified] : Femoral access site is c/d/i without hematoma.  2+ distal pulses

## 2024-08-09 ENCOUNTER — NON-APPOINTMENT (OUTPATIENT)
Age: 74
End: 2024-08-09

## 2024-08-14 ENCOUNTER — TRANSCRIPTION ENCOUNTER (OUTPATIENT)
Age: 74
End: 2024-08-14

## 2024-08-16 ENCOUNTER — TRANSCRIPTION ENCOUNTER (OUTPATIENT)
Age: 74
End: 2024-08-16

## 2024-08-21 ENCOUNTER — APPOINTMENT (OUTPATIENT)
Dept: CARDIOLOGY | Facility: CLINIC | Age: 74
End: 2024-08-21
Payer: MEDICARE

## 2024-08-21 ENCOUNTER — NON-APPOINTMENT (OUTPATIENT)
Age: 74
End: 2024-08-21

## 2024-08-21 DIAGNOSIS — Z95.1 PRESENCE OF AORTOCORONARY BYPASS GRAFT: ICD-10-CM

## 2024-08-21 DIAGNOSIS — I25.10 ATHEROSCLEROTIC HEART DISEASE OF NATIVE CORONARY ARTERY W/OUT ANGINA PECTORIS: ICD-10-CM

## 2024-08-21 DIAGNOSIS — E11.9 TYPE 2 DIABETES MELLITUS W/OUT COMPLICATIONS: ICD-10-CM

## 2024-08-21 DIAGNOSIS — R06.00 DYSPNEA, UNSPECIFIED: ICD-10-CM

## 2024-08-21 DIAGNOSIS — I10 ESSENTIAL (PRIMARY) HYPERTENSION: ICD-10-CM

## 2024-08-21 DIAGNOSIS — Z79.899 OTHER LONG TERM (CURRENT) DRUG THERAPY: ICD-10-CM

## 2024-08-21 DIAGNOSIS — E78.00 PURE HYPERCHOLESTEROLEMIA, UNSPECIFIED: ICD-10-CM

## 2024-08-21 DIAGNOSIS — Z92.3 PERSONAL HISTORY OF IRRADIATION: ICD-10-CM

## 2024-08-21 PROCEDURE — 99215 OFFICE O/P EST HI 40 MIN: CPT

## 2024-08-21 PROCEDURE — 93000 ELECTROCARDIOGRAM COMPLETE: CPT

## 2024-08-21 PROCEDURE — G2211 COMPLEX E/M VISIT ADD ON: CPT

## 2024-08-21 RX ORDER — INSULIN DEGLUDEC INJECTION 100 U/ML
100 INJECTION, SOLUTION SUBCUTANEOUS
Refills: 0 | Status: ACTIVE | COMMUNITY

## 2024-08-21 RX ORDER — EMPAGLIFLOZIN 10 MG/1
10 TABLET, FILM COATED ORAL
Qty: 90 | Refills: 2 | Status: ACTIVE | COMMUNITY
Start: 2024-08-21

## 2024-08-21 RX ORDER — SACUBITRIL AND VALSARTAN 24; 26 MG/1; MG/1
24-26 TABLET, FILM COATED ORAL TWICE DAILY
Refills: 0 | Status: ACTIVE | COMMUNITY

## 2024-08-21 RX ORDER — EMPAGLIFLOZIN 25 MG/1
25 TABLET, FILM COATED ORAL DAILY
Refills: 0 | Status: ACTIVE | COMMUNITY

## 2024-08-21 NOTE — HISTORY OF PRESENT ILLNESS
[FreeTextEntry1] :  73 year old gentleman with DM II, HTN, HLD, and severe CAD s/p CABG (patent LIMA-LAD) and multiple PCI c/b ISR -- particularly of mid LMCA shaft, which was likely culprit behind recent STEMI presentation to [facility] ER warranting IABP placement, aspiration thrombectomy of LMCA and OM thrombi, and aggressive POBA of LMCA and POBA w/o additional layer of stent (Aug 10th, 2024) s/p perclose of LFA arteriotomy after IABP removal on Aug 12, 2024. Discharged Tuesday Aug 13th from [facility] with plan for high-complexity PCI of focal severe ISR at mid LMCA under the expertise of one of our [facility] interventional cardiologists Dr. Hercules (tentatively planned for Wed Sept 4th).  Patient is doing well, mild orthostatic symptoms. SBP low 100s. Often mid 90s. Otherwise, no chest pain, shortness of breath, or palpitations. He does not have any significant ecchymosis, bruising, pain, tenderness or sensorimotor dysfunction with RRA or LFA arteriotomies -- excellent hemostasis and no hematoma. Reviewed and reconciled his medications. Grateful to [facility] staff for their care, and eager to proceed with plan.   08/21/24 ECG: SR with 1st deg AVB; IVCD. Rare PVC 08/11/24 TTE:  1. Left ventricular cavity is normal in size. Left ventricular systolic function is severely decreased with an ejection fraction visually estimated at <20 %. 2. Normal right ventricular cavity size and reduced right ventricular systolic function. 3. There is moderate (grade 2) left ventricular diastolic dysfunction. 4. Left atrium is mildly dilated. 5. The right atrium is normal in size. 6. Moderate to severe mitral regurgitation. 7. There is mild calcification of the aortic valve leaflets. 8. There is mild posterior calcification of the mitral valve annulus. 9. No echocardiographic evidence of pulmonary hypertension. 10. Estimated pulmonary artery systolic pressure is 31 mmHg. 11. The inferior vena cava is dilated measuring 2.15 cm in diameter, (dilated >2.1cm) with abnormal inspiratory collapse (abnormal <50%) consistent with elevated right atrial pressure ( R 15, range 10-20mmHg).  08/10/24 Cath:  Subtotal thrombotic ISR of LMCA and OM2 s/p aspiration thrombectomy and IVUS-guided POBA w/o JAVIER. Full resolution of CP. Successful 50 cm IABP placement via L femoral arterial access.

## 2024-08-21 NOTE — DISCUSSION/SUMMARY
[FreeTextEntry1] : 73 year old with HTN, HLD, DM II, and severe native CAD s/p CABG w/ patent LIMA-LAD and multiple PCI -- multiple ACS presentations likely in setting of LMCA stent underexpansion / ISR. Very challenging to remedy per discussion with prior operators and my experience with this patient on Aug 10th 2024 -- plan for a more durable solution of 3 layer ISR with larger coronary laser diameter, and highly aggressive balloon angioplasty +/- cutting balloon. His ECG doesn't show infarct pattern -- I expect his EF will improve and will obtain a limited to evaluate this as it may change whether we need to use impella support for this PCI at [facility] (ideally with Dr. Hercules).   Plan:   - Continue  DAPT   - Reduce jardiance to 10 mg daily   - Continue metoprolol succinate 25 mg daily   - Continue entresto 24-26 mg BID   - Labs in 10 days  - TTE in 7-10 days for interval evaluation of EF and MR and WMA  - OP PCI at [facility] scheduling for Wed Sept 4th w/ Radha Hercules / Anne +/- Juan Carlso in progress   RTC: DIANE Rangel MD, FACC Interventional Cardiology  [EKG obtained to assist in diagnosis and management of assessed problem(s)] : EKG obtained to assist in diagnosis and management of assessed problem(s)

## 2024-08-21 NOTE — HISTORY OF PRESENT ILLNESS
[FreeTextEntry1] :  73 year old gentleman with DM II, HTN, HLD, and severe CAD s/p CABG (patent LIMA-LAD) and multiple PCI c/b ISR -- particularly of mid LMCA shaft, which was likely culprit behind recent STEMI presentation to [facility] ER warranting IABP placement, aspiration thrombectomy of LMCA and OM thrombi, and aggressive POBA of LMCA and POBA w/o additional layer of stent (Aug 10th, 2024) s/p perclose of LFA arteriotomy after IABP removal on Aug 12, 2024. Discharged Tuesday Aug 13th from [facility] with plan for high-complexity PCI of focal severe ISR at mid LMCA under the expertise of one of our [facility] interventional cardiologists Dr. Hercules (tentatively planned for Wed Sept 4th).  Patient is doing well, mild orthostatic symptoms. SBP low 100s. Often mid 90s. Otherwise, no chest pain, shortness of breath, or palpitations. He does not have any significant ecchymosis, bruising, pain, tenderness or sensorimotor dysfunction with RRA or LFA arteriotomies -- excellent hemostasis and no hematoma. Reviewed and reconciled his medications. Grateful to [facility] staff for their care, and eager to proceed with plan.   08/21/24 ECG: SR with 1st deg AVB; IVCD. Rare PVC 08/11/24 TTE:  1. Left ventricular cavity is normal in size. Left ventricular systolic function is severely decreased with an ejection fraction visually estimated at <20 %. 2. Normal right ventricular cavity size and reduced right ventricular systolic function. 3. There is moderate (grade 2) left ventricular diastolic dysfunction. 4. Left atrium is mildly dilated. 5. The right atrium is normal in size. 6. Moderate to severe mitral regurgitation. 7. There is mild calcification of the aortic valve leaflets. 8. There is mild posterior calcification of the mitral valve annulus. 9. No echocardiographic evidence of pulmonary hypertension. 10. Estimated pulmonary artery systolic pressure is 31 mmHg. 11. The inferior vena cava is dilated measuring 2.15 cm in diameter, (dilated >2.1cm) with abnormal inspiratory collapse (abnormal <50%) consistent with elevated right atrial pressure ( R 15, range 10-20mmHg).  08/10/24 Cath:  Subtotal thrombotic ISR of LMCA and OM2 s/p aspiration thrombectomy and IVUS-guided POBA w/o JAVIER. Full resolution of CP. Successful 50 cm IABP placement via L femoral arterial access.

## 2024-08-21 NOTE — DISCUSSION/SUMMARY
[FreeTextEntry1] : 73 year old with HTN, HLD, DM II, and severe native CAD s/p CABG w/ patent LIMA-LAD and multiple PCI -- multiple ACS presentations likely in setting of LMCA stent underexpansion / ISR. Very challenging to remedy per discussion with prior operators and my experience with this patient on Aug 10th 2024 -- plan for a more durable solution of 3 layer ISR with larger coronary laser diameter, and highly aggressive balloon angioplasty +/- cutting balloon. His ECG doesn't show infarct pattern -- I expect his EF will improve and will obtain a limited to evaluate this as it may change whether we need to use impella support for this PCI at [facility] (ideally with Dr. Hercules).   Plan:   - Continue  DAPT   - Reduce jardiance to 10 mg daily   - Continue metoprolol succinate 25 mg daily   - Continue entresto 24-26 mg BID   - Labs in 10 days  - TTE in 7-10 days for interval evaluation of EF and MR and WMA  - OP PCI at [facility] scheduling for Wed Sept 4th w/ Radha Hercules / Anne +/- Juan Carlos in progress   RTC: DIANE Rangel MD, FACC Interventional Cardiology  [EKG obtained to assist in diagnosis and management of assessed problem(s)] : EKG obtained to assist in diagnosis and management of assessed problem(s)

## 2024-08-21 NOTE — DISCUSSION/SUMMARY
[FreeTextEntry1] : 73 year old with HTN, HLD, DM II, and severe native CAD s/p CABG w/ patent LIMA-LAD and multiple PCI -- multiple ACS presentations likely in setting of LMCA stent underexpansion / ISR. Very challenging to remedy per discussion with prior operators and my experience with this patient on Aug 10th 2024 -- plan for a more durable solution of 3 layer ISR with larger coronary laser diameter, and highly aggressive balloon angioplasty +/- cutting balloon. His ECG doesn't show infarct pattern -- I expect his EF will improve and will obtain a limited to evaluate this as it may change whether we need to use impella support for this PCI at [facility] (ideally with Dr. Hercules).   Plan:   - Continue  DAPT   - Reduce jardiance to 10 mg daily   - Continue metoprolol succinate 25 mg daily   - Continue entresto 24-26 mg BID   - Labs in 10 days  - TTE in 7-10 days for interval evaluation of EF and MR and WMA  - OP PCI at [facility] scheduling for Wed Sept 4th w/ Radha Hercules / Anne +/- Juan Carlos in progress   RTC: DIANE Rangel MD, FACC Interventional Cardiology  [EKG obtained to assist in diagnosis and management of assessed problem(s)] : EKG obtained to assist in diagnosis and management of assessed problem(s)

## 2024-08-21 NOTE — HISTORY OF PRESENT ILLNESS
[FreeTextEntry1] :  73 year old gentleman with DM II, HTN, HLD, and severe CAD s/p CABG (patent LIMA-LAD) and multiple PCI c/b ISR -- particularly of mid LMCA shaft, which was likely culprit behind recent STEMI presentation to [facility] ER warranting IABP placement, aspiration thrombectomy of LMCA and OM thrombi, and aggressive POBA of LMCA and POBA w/o additional layer of stent (Aug 10th, 2024) s/p perclose of LFA arteriotomy after IABP removal on Aug 12, 2024. Discharged Tuesday Aug 13th from [facility] with plan for high-complexity PCI of focal severe ISR at mid LMCA under the expertise of one of our [facility] interventional cardiologists Dr. Hercules (tentatively planned for Wed Sept 4th).  Patient is doing well, mild orthostatic symptoms. SBP low 100s. Often mid 90s. Otherwise, no chest pain, shortness of breath, or palpitations. He does not have any significant ecchymosis, bruising, pain, tenderness or sensorimotor dysfunction with RRA or LFA arteriotomies -- excellent hemostasis and no hematoma. Reviewed and reconciled his medications. Grateful to [facility] staff for their care, and eager to proceed with plan.   08/21/24 ECG: SR with 1st deg AVB; IVCD. Rare PVC 08/11/24 TTE:  1. Left ventricular cavity is normal in size. Left ventricular systolic function is severely decreased with an ejection fraction visually estimated at <20 %. 2. Normal right ventricular cavity size and reduced right ventricular systolic function. 3. There is moderate (grade 2) left ventricular diastolic dysfunction. 4. Left atrium is mildly dilated. 5. The right atrium is normal in size. 6. Moderate to severe mitral regurgitation. 7. There is mild calcification of the aortic valve leaflets. 8. There is mild posterior calcification of the mitral valve annulus. 9. No echocardiographic evidence of pulmonary hypertension. 10. Estimated pulmonary artery systolic pressure is 31 mmHg. 11. The inferior vena cava is dilated measuring 2.15 cm in diameter, (dilated >2.1cm) with abnormal inspiratory collapse (abnormal <50%) consistent with elevated right atrial pressure ( R 15, range 10-20mmHg).  08/10/24 Cath:  Subtotal thrombotic ISR of LMCA and OM2 s/p aspiration thrombectomy and IVUS-guided POBA w/o JAVIER. Full resolution of CP. Successful 50 cm IABP placement via L femoral arterial access.

## 2024-08-21 NOTE — HISTORY OF PRESENT ILLNESS
[FreeTextEntry1] :  73 year old gentleman with DM II, HTN, HLD, and severe CAD s/p CABG (patent LIMA-LAD) and multiple PCI c/b ISR -- particularly of mid LMCA shaft, which was likely culprit behind recent STEMI presentation to [facility] warranting IABP placement, aspiration thrombectomy of LMCA and OM thrombi, and aggressive POBA of LMCA and POBA w/o additional layer of stent (Aug 10th, 2024) s/p perclose of LFA arteriotomy after IABP removal on Aug 12, 2024. Discharged Tuesday Aug 13th from [facility] with plan for high-complexity PCI of focal severe ISR at mid LMCA under the expertise of one of our [facility] interventional cardiologists Dr. Hercules (tentatively planned for Wed Sept 4th).  Patient is doing well, mild orthostatic symptoms. SBP low 100s. Often mid 90s. Otherwise, no chest pain, shortness of breath, or palpitations. He does not have any significant ecchymosis, bruising, pain, tenderness or sensorimotor dysfunction with RRA or LFA arteriotomies -- excellent hemostasis and no hematoma. Reviewed and reconciled his medications. Grateful to [facility] staff for their care, and eager to proceed with plan.   08/21/24 ECG: SR with 1st deg AVB; IVCD. Rare PVC 08/11/24 TTE:  1. Left ventricular cavity is normal in size. Left ventricular systolic function is severely decreased with an ejection fraction visually estimated at <20 %. 2. Normal right ventricular cavity size and reduced right ventricular systolic function. 3. There is moderate (grade 2) left ventricular diastolic dysfunction. 4. Left atrium is mildly dilated. 5. The right atrium is normal in size. 6. Moderate to severe mitral regurgitation. 7. There is mild calcification of the aortic valve leaflets. 8. There is mild posterior calcification of the mitral valve annulus. 9. No echocardiographic evidence of pulmonary hypertension. 10. Estimated pulmonary artery systolic pressure is 31 mmHg. 11. The inferior vena cava is dilated measuring 2.15 cm in diameter, (dilated >2.1cm) with abnormal inspiratory collapse (abnormal <50%) consistent with elevated right atrial pressure ( R 15, range 10-20mmHg).  08/10/24 Cath:  Subtotal thrombotic ISR of LMCA and OM2 s/p aspiration thrombectomy and IVUS-guided POBA w/o JAVIER. Full resolution of CP. Successful 50 cm IABP placement via L femoral arterial access.

## 2024-08-21 NOTE — DISCUSSION/SUMMARY
[FreeTextEntry1] : 73 year old with HTN, HLD, DM II, and severe native CAD s/p CABG w/ patent LIMA-LAD and multiple PCI -- multiple ACS presentations likely in setting of LMCA stent underexpansion / ISR. Very challenging to remedy per discussion with prior operators and my experience with this patient on Aug 10th 2024 -- plan for a more durable solution of 3 layer ISR with larger coronary laser diameter, and highly aggressive balloon angioplasty +/- cutting balloon. His ECG doesn't show infarct pattern -- I expect his EF will improve and will obtain a limited to evaluate this as it may change whether we need to use impella support for this PCI at [facility] (ideally with Dr. Hercules).   Plan:   - Continue  DAPT   - Reduce jardiance to 10 mg daily   - Continue metoprolol succinate 25 mg daily   - Continue entresto 24-26 mg BID   - Labs in 10 days  - TTE in 7-10 days for interval evaluation of EF and MR and WMA  - OP PCI at [facility] scheduling for Wed Sept 4th w/ Radha Hercules / Anne +/- Juan Carlos in progress   RTC: DIANE Rangel MD, FACC Interventional Cardiology  [EKG obtained to assist in diagnosis and management of assessed problem(s)] : EKG obtained to assist in diagnosis and management of assessed problem(s)

## 2024-08-26 ENCOUNTER — TRANSCRIPTION ENCOUNTER (OUTPATIENT)
Age: 74
End: 2024-08-26

## 2024-08-28 ENCOUNTER — APPOINTMENT (OUTPATIENT)
Dept: CARDIOLOGY | Facility: CLINIC | Age: 74
End: 2024-08-28
Payer: MEDICARE

## 2024-08-28 PROCEDURE — 93306 TTE W/DOPPLER COMPLETE: CPT

## 2024-09-03 ENCOUNTER — TRANSCRIPTION ENCOUNTER (OUTPATIENT)
Age: 74
End: 2024-09-03

## 2024-09-03 ENCOUNTER — APPOINTMENT (OUTPATIENT)
Dept: CARDIOLOGY | Facility: CLINIC | Age: 74
End: 2024-09-03

## 2024-09-12 ENCOUNTER — TRANSCRIPTION ENCOUNTER (OUTPATIENT)
Age: 74
End: 2024-09-12

## 2024-09-25 ENCOUNTER — TRANSCRIPTION ENCOUNTER (OUTPATIENT)
Age: 74
End: 2024-09-25

## 2024-09-25 ENCOUNTER — INPATIENT (INPATIENT)
Facility: HOSPITAL | Age: 74
LOS: 0 days | Discharge: HOME | DRG: 998 | End: 2024-09-26
Attending: INTERNAL MEDICINE | Admitting: INTERNAL MEDICINE
Payer: COMMERCIAL

## 2024-09-25 VITALS
SYSTOLIC BLOOD PRESSURE: 145 MMHG | HEART RATE: 94 BPM | OXYGEN SATURATION: 100 % | TEMPERATURE: 97 F | RESPIRATION RATE: 17 BRPM | DIASTOLIC BLOOD PRESSURE: 83 MMHG

## 2024-09-25 DIAGNOSIS — Z95.1 PRESENCE OF AORTOCORONARY BYPASS GRAFT: Chronic | ICD-10-CM

## 2024-09-25 DIAGNOSIS — Z95.820 PERIPHERAL VASCULAR ANGIOPLASTY STATUS WITH IMPLANTS AND GRAFTS: Chronic | ICD-10-CM

## 2024-09-25 DIAGNOSIS — I21.4 NON-ST ELEVATION (NSTEMI) MYOCARDIAL INFARCTION: ICD-10-CM

## 2024-09-25 DIAGNOSIS — Z98.62 PERIPHERAL VASCULAR ANGIOPLASTY STATUS: Chronic | ICD-10-CM

## 2024-09-25 DIAGNOSIS — I21.0: ICD-10-CM

## 2024-09-25 LAB
ALBUMIN SERPL ELPH-MCNC: 4.3 G/DL — SIGNIFICANT CHANGE UP (ref 3.3–5.2)
ALP SERPL-CCNC: 95 U/L — SIGNIFICANT CHANGE UP (ref 40–120)
ALT FLD-CCNC: 31 U/L — SIGNIFICANT CHANGE UP
ANION GAP SERPL CALC-SCNC: 12 MMOL/L — SIGNIFICANT CHANGE UP (ref 5–17)
AST SERPL-CCNC: 31 U/L — SIGNIFICANT CHANGE UP
BASOPHILS # BLD AUTO: 0.05 K/UL — SIGNIFICANT CHANGE UP (ref 0–0.2)
BASOPHILS NFR BLD AUTO: 0.9 % — SIGNIFICANT CHANGE UP (ref 0–2)
BILIRUB SERPL-MCNC: 1.1 MG/DL — SIGNIFICANT CHANGE UP (ref 0.4–2)
BUN SERPL-MCNC: 15.5 MG/DL — SIGNIFICANT CHANGE UP (ref 8–20)
CALCIUM SERPL-MCNC: 9.2 MG/DL — SIGNIFICANT CHANGE UP (ref 8.4–10.5)
CHLORIDE SERPL-SCNC: 101 MMOL/L — SIGNIFICANT CHANGE UP (ref 96–108)
CO2 SERPL-SCNC: 27 MMOL/L — SIGNIFICANT CHANGE UP (ref 22–29)
CREAT SERPL-MCNC: 0.81 MG/DL — SIGNIFICANT CHANGE UP (ref 0.5–1.3)
EGFR: 94 ML/MIN/1.73M2 — SIGNIFICANT CHANGE UP
EOSINOPHIL # BLD AUTO: 0.15 K/UL — SIGNIFICANT CHANGE UP (ref 0–0.5)
EOSINOPHIL NFR BLD AUTO: 2.6 % — SIGNIFICANT CHANGE UP (ref 0–6)
GLUCOSE BLDC GLUCOMTR-MCNC: 73 MG/DL — SIGNIFICANT CHANGE UP (ref 70–99)
GLUCOSE BLDC GLUCOMTR-MCNC: 99 MG/DL — SIGNIFICANT CHANGE UP (ref 70–99)
GLUCOSE SERPL-MCNC: 82 MG/DL — SIGNIFICANT CHANGE UP (ref 70–99)
HCT VFR BLD CALC: 46.1 % — SIGNIFICANT CHANGE UP (ref 39–50)
HGB BLD-MCNC: 15 G/DL — SIGNIFICANT CHANGE UP (ref 13–17)
IMM GRANULOCYTES NFR BLD AUTO: 0.2 % — SIGNIFICANT CHANGE UP (ref 0–0.9)
LYMPHOCYTES # BLD AUTO: 1.53 K/UL — SIGNIFICANT CHANGE UP (ref 1–3.3)
LYMPHOCYTES # BLD AUTO: 26.9 % — SIGNIFICANT CHANGE UP (ref 13–44)
MAGNESIUM SERPL-MCNC: 1.9 MG/DL — SIGNIFICANT CHANGE UP (ref 1.6–2.6)
MCHC RBC-ENTMCNC: 30.9 PG — SIGNIFICANT CHANGE UP (ref 27–34)
MCHC RBC-ENTMCNC: 32.5 GM/DL — SIGNIFICANT CHANGE UP (ref 32–36)
MCV RBC AUTO: 94.9 FL — SIGNIFICANT CHANGE UP (ref 80–100)
MONOCYTES # BLD AUTO: 0.67 K/UL — SIGNIFICANT CHANGE UP (ref 0–0.9)
MONOCYTES NFR BLD AUTO: 11.8 % — SIGNIFICANT CHANGE UP (ref 2–14)
NEUTROPHILS # BLD AUTO: 3.27 K/UL — SIGNIFICANT CHANGE UP (ref 1.8–7.4)
NEUTROPHILS NFR BLD AUTO: 57.6 % — SIGNIFICANT CHANGE UP (ref 43–77)
PLATELET # BLD AUTO: 200 K/UL — SIGNIFICANT CHANGE UP (ref 150–400)
POTASSIUM SERPL-MCNC: 4.3 MMOL/L — SIGNIFICANT CHANGE UP (ref 3.5–5.3)
POTASSIUM SERPL-SCNC: 4.3 MMOL/L — SIGNIFICANT CHANGE UP (ref 3.5–5.3)
PROT SERPL-MCNC: 7.5 G/DL — SIGNIFICANT CHANGE UP (ref 6.6–8.7)
RBC # BLD: 4.86 M/UL — SIGNIFICANT CHANGE UP (ref 4.2–5.8)
RBC # FLD: 14.7 % — HIGH (ref 10.3–14.5)
SODIUM SERPL-SCNC: 140 MMOL/L — SIGNIFICANT CHANGE UP (ref 135–145)
WBC # BLD: 5.68 K/UL — SIGNIFICANT CHANGE UP (ref 3.8–10.5)
WBC # FLD AUTO: 5.68 K/UL — SIGNIFICANT CHANGE UP (ref 3.8–10.5)

## 2024-09-25 PROCEDURE — 92924 PRQ TRLUML C ATHRC 1 ART&/BR: CPT | Mod: LC

## 2024-09-25 PROCEDURE — 99152 MOD SED SAME PHYS/QHP 5/>YRS: CPT

## 2024-09-25 PROCEDURE — 92978 ENDOLUMINL IVUS OCT C 1ST: CPT | Mod: 26,LC

## 2024-09-25 PROCEDURE — 93010 ELECTROCARDIOGRAM REPORT: CPT | Mod: 76

## 2024-09-25 RX ORDER — ALCOHOL ANTISEPTIC PADS
15 PADS, MEDICATED (EA) TOPICAL ONCE
Refills: 0 | Status: DISCONTINUED | OUTPATIENT
Start: 2024-09-25 | End: 2024-09-26

## 2024-09-25 RX ORDER — TICAGRELOR 60 MG/1
90 TABLET ORAL EVERY 12 HOURS
Refills: 0 | Status: DISCONTINUED | OUTPATIENT
Start: 2024-09-25 | End: 2024-09-26

## 2024-09-25 RX ORDER — SODIUM CHLORIDE 0.9 % (FLUSH) 0.9 %
50 SYRINGE (ML) INJECTION ONCE
Refills: 0 | Status: DISCONTINUED | OUTPATIENT
Start: 2024-09-25 | End: 2024-09-26

## 2024-09-25 RX ORDER — ALCOHOL ANTISEPTIC PADS
25 PADS, MEDICATED (EA) TOPICAL ONCE
Refills: 0 | Status: DISCONTINUED | OUTPATIENT
Start: 2024-09-25 | End: 2024-09-26

## 2024-09-25 RX ORDER — CHLORHEXIDINE GLUCONATE ORAL RINSE 1.2 MG/ML
1 SOLUTION DENTAL ONCE
Refills: 0 | Status: DISCONTINUED | OUTPATIENT
Start: 2024-09-25 | End: 2024-09-26

## 2024-09-25 RX ORDER — SACUBITRIL AND VALSARTAN 97; 103 MG/1; MG/1
1 TABLET, FILM COATED ORAL
Refills: 0 | Status: DISCONTINUED | OUTPATIENT
Start: 2024-09-25 | End: 2024-09-26

## 2024-09-25 RX ORDER — INSULIN LISPRO 100/ML
VIAL (ML) SUBCUTANEOUS
Refills: 0 | Status: DISCONTINUED | OUTPATIENT
Start: 2024-09-25 | End: 2024-09-26

## 2024-09-25 RX ORDER — DAPAGLIFLOZIN 10 MG/1
1 TABLET, FILM COATED ORAL
Refills: 0 | DISCHARGE

## 2024-09-25 RX ORDER — SACUBITRIL AND VALSARTAN 97; 103 MG/1; MG/1
1 TABLET, FILM COATED ORAL
Refills: 0 | DISCHARGE

## 2024-09-25 RX ORDER — ATORVASTATIN CALCIUM 10 MG/1
40 TABLET, FILM COATED ORAL AT BEDTIME
Refills: 0 | Status: DISCONTINUED | OUTPATIENT
Start: 2024-09-25 | End: 2024-09-26

## 2024-09-25 RX ORDER — SEMAGLUTIDE 1.34 MG/ML
0 INJECTION, SOLUTION SUBCUTANEOUS
Refills: 0 | DISCHARGE

## 2024-09-25 RX ORDER — GLUCAGON INJECTION, SOLUTION 0.5 MG/.1ML
1 INJECTION, SOLUTION SUBCUTANEOUS ONCE
Refills: 0 | Status: DISCONTINUED | OUTPATIENT
Start: 2024-09-25 | End: 2024-09-26

## 2024-09-25 RX ORDER — EZETIMIBE 10 MG/1
10 TABLET ORAL DAILY
Refills: 0 | Status: DISCONTINUED | OUTPATIENT
Start: 2024-09-25 | End: 2024-09-26

## 2024-09-25 RX ORDER — METOPROLOL TARTRATE 50 MG
75 TABLET ORAL DAILY
Refills: 0 | Status: DISCONTINUED | OUTPATIENT
Start: 2024-09-26 | End: 2024-09-26

## 2024-09-25 RX ORDER — INSULIN LISPRO 100/ML
VIAL (ML) SUBCUTANEOUS AT BEDTIME
Refills: 0 | Status: DISCONTINUED | OUTPATIENT
Start: 2024-09-25 | End: 2024-09-26

## 2024-09-25 RX ORDER — ASPIRIN 325 MG
81 TABLET ORAL DAILY
Refills: 0 | Status: DISCONTINUED | OUTPATIENT
Start: 2024-09-26 | End: 2024-09-26

## 2024-09-25 RX ORDER — SODIUM CHLORIDE IRRIG SOLUTION 0.9 %
1000 SOLUTION, IRRIGATION IRRIGATION
Refills: 0 | Status: DISCONTINUED | OUTPATIENT
Start: 2024-09-25 | End: 2024-09-26

## 2024-09-25 RX ORDER — ALCOHOL ANTISEPTIC PADS
12.5 PADS, MEDICATED (EA) TOPICAL ONCE
Refills: 0 | Status: DISCONTINUED | OUTPATIENT
Start: 2024-09-25 | End: 2024-09-26

## 2024-09-25 RX ORDER — EVOLOCUMAB 140 MG/ML
140 INJECTION, SOLUTION SUBCUTANEOUS
Refills: 0 | DISCHARGE

## 2024-09-25 RX ORDER — EMPAGLIFLOZIN 25 MG/1
1 TABLET, FILM COATED ORAL
Refills: 0 | DISCHARGE

## 2024-09-25 RX ORDER — TICAGRELOR 60 MG/1
1 TABLET ORAL
Refills: 0 | DISCHARGE

## 2024-09-25 RX ADMIN — ATORVASTATIN CALCIUM 40 MILLIGRAM(S): 10 TABLET, FILM COATED ORAL at 22:10

## 2024-09-25 RX ADMIN — SACUBITRIL AND VALSARTAN 1 TABLET(S): 97; 103 TABLET, FILM COATED ORAL at 22:10

## 2024-09-25 RX ADMIN — TICAGRELOR 90 MILLIGRAM(S): 60 TABLET ORAL at 22:10

## 2024-09-25 NOTE — DISCHARGE NOTE PROVIDER - NSDCCPTREATMENT_GEN_ALL_CORE_FT
PRINCIPAL PROCEDURE  Procedure: Left heart cardiac cath  Findings and Treatment: severe pLCx stenosis --> [place] Cutting Balloon utilized, Laser atherectomy, Drug coated balloon utilized

## 2024-09-25 NOTE — H&P PST ADULT - NSICDXPASTSURGICALHX_GEN_ALL_CORE_FT
PAST SURGICAL HISTORY:  S/P angioplasty KELLY POBA 10/15/19    S/P angioplasty with stent x 3 last stent 7 months ago with Dr. Thomas - OM1    S/P CABG x 3 25 years ago

## 2024-09-25 NOTE — DISCHARGE NOTE PROVIDER - CARE PROVIDER_API CALL
Reji Thomas  Cardiovascular Disease  [address] [place] [id number]  Phone: [phone]  Fax: [phone]  Established Patient  Follow Up Time: 2 weeks

## 2024-09-25 NOTE — DISCHARGE NOTE PROVIDER - NSDCFUADDINST_GEN_ALL_CORE_FT
- Bruising at the groin, sometimes extending down the leg, and/or a small lump under the skin at the groin access site is normal and will resolve within 2 – 3 weeks.   - Occasional skipped beats or palpitations that last for a few beats are common and generally resolve within 1-2 months.   - You may walk and take stairs at a regular pace.   - Do not perform any exercise more strenuous than walking for 1 week.   - Do not strain or lift heavy objects for 1 week.  - You may shower the day after the procedure.  - Do not soak in water (such as tub baths, hot tubs, swimming, etc.) for 1 week.   - You may resume all other activities the day after the procedure.  Call your doctor if:   - you notice bleeding, redness, drainage, swelling, increased tenderness or a hot sensation around the catheter insertion site.   - your temperature is greater than 100 degrees F for more than 24 hours.  - your rapid heart rhythm returns.  - you have any questions or concerns regarding the procedure.  If significant bleeding and/or a large lump (the size of a golf ball or bigger) occurs:  - Lie flat and apply continuous direct pressure just above the puncture site for at least 10 minutes  - If the issue resolves, notify your physician immediately.    - If the bleeding cannot be controlled, please seek immediate medical attention.  If you experience increased difficulty breathing or chest pain, or if you faint or have dizzy spells, please seek immediate medical attention.    Plan:  -Formal cath report pending  -Post procedure management/monitoring per protocol  -Access site precautions  -Continue current medical therapy  -Dual anti platelet therapy with aspirin/brilinta **  -Cont BB with Toprol 75mg po daily **  -Cont statin therapy with Lipitor 40mg po qHS and Repatha  -Educated regarding strict adherence with DAPT   -Educated regarding post procedure management and care  -Discussed the importance of RF modification  -Cardiac rehab info provided/referral and communication to cardiac rehab completed  -F/U outpt in 1-2 weeks with Cardiologist Dr. Thomas

## 2024-09-25 NOTE — CHART NOTE - NSCHARTNOTEFT_GEN_A_CORE
Attempted to remove right groin fem stop and patient with bleeding from right groin. Right groin fem stop placed back to right groin UNINFLATED 0mmhg. RLE remains acyanotic; warm to touch; motor/sensory function intact; +distal pulses    PLAN:  -Will reattempt to remove right groin fem stop in 30 minutes.  -Patient encouraged to keep hob flat and RLE straight while fem stop in place  -Please assess VS, groin checks, peripheral vascular checks and pulse checks q 15 minutes while fem stop in place.  -Please notify interventional cardiology team immediately if any active bleeding, hematoma, pain at right groin site or any change in color/temperature/sensation of RLE.

## 2024-09-25 NOTE — H&P PST ADULT - ASSESSMENT
HPI: 73 year old with HTN, HLD, DM II, and severe native CAD s/p CABG w/ patent LIMA-LAD and multiple PCI -- multiple ACS presentations likely in setting of LMCA stent underexpansion / ISR.  Plan for a more durable solution of 3 layer ISR with larger coronary laser diameter, and highly aggressive balloon angioplasty +/- cutting balloon.       ASA  Mallampati  GFR  Creat  Bleeding Risk      Plan/Recommendations:   -plan for PCI on 9/25  -patient seen and examined  -ECG and Labs reviewed  -NPO after midnight prior with exception of sip of water with morning medications  -Hold Jardiance, 1/2 dose Levemir       HPI: 73 year old with HTN, HLD, DM II, and severe native CAD s/p CABG w/ patent LIMA-LAD and multiple PCI -- multiple ACS presentations likely in setting of LMCA stent underexpansion / ISR.  Plan for a more durable solution of 3 layer ISR with larger coronary laser diameter, and highly aggressive balloon angioplasty +/- cutting balloon.     Risk Stratification:  ASA: 3  Mallampati: 3  Bleeding Risk:   Creatinine:   GFR:   Pt assessed, appropriate for sedation, pt educated regarding the plan for Versed/Fentanyl as needed.    Plan/Recommendations:   -plan for [facility]  -preferred access: RFA vs. LFA  -patient seen and examined  -confirmed appropriate NPO duration  -ECG and Labs reviewed  -Aspirin 81mg and Brilinta 90mg po taken pre-cath  -NS 50mL IV bolus pre-cath *** -- amount reduced 2/2 low EF  -procedure discussed with patient; risks and benefits explained, questions answered  -consent obtained by attending IC   HPI: 73 year old with HTN, HLD, DM II, and severe native CAD s/p CABG w/ patent LIMA-LAD and multiple PCI -- multiple ACS presentations likely in setting of LMCA stent underexpansion / ISR.  Plan for a more durable solution of 3 layer ISR with larger coronary laser diameter, and highly aggressive balloon angioplasty +/- cutting balloon.     Risk Stratification:  ASA: 3  Mallampati: 3  Bleeding Risk: 0.7%  Creatinine: 0.81  GFR: 94  Pt assessed, appropriate for sedation, pt educated regarding the plan for Versed/Fentanyl as needed.    Plan/Recommendations:   -plan for [facility]  -preferred access: RFA vs. LFA  -patient seen and examined  -confirmed appropriate NPO duration  -ECG and Labs reviewed  -Aspirin 81mg and Brilinta 90mg po taken pre-cath  -NS 50mL IV bolus pre-cath *** -- amount reduced 2/2 low EF  -procedure discussed with patient; risks and benefits explained, questions answered  -consent obtained by attending IC

## 2024-09-25 NOTE — DISCHARGE NOTE PROVIDER - NSDCMRMEDTOKEN_GEN_ALL_CORE_FT
aspirin 81 mg oral delayed release tablet: 1 tab(s) orally once a day  atorvastatin 40 mg oral tablet: 1 tab(s) orally once a day (at bedtime)  Brilinta (ticagrelor) 90 mg oral tablet: 1 tab(s) orally 2 times a day  Entresto 24 mg-26 mg oral tablet: 1 tab(s) orally once a day  ezetimibe 10 mg oral tablet: 1 tab(s) orally once a day  Jardiance 10 mg oral tablet: 1 tab(s) orally once a day  metoprolol succinate 25 mg oral tablet, extended release: 3 tab(s) orally once a day  NovoLOG FlexPen 100 units/mL injectable solution: injectable sliding scale  Omega-3 350 mg oral capsule: 2 cap(s) orally 2 times a day  Ozempic 2 mg/1.5 mL (1 mg dose) subcutaneous solution: subcutaneous once a week  Repatha SureClick 140 mg/mL subcutaneous solution: 140 milligram(s) subcutaneously every 2 weeks

## 2024-09-25 NOTE — CHART NOTE - NSCHARTNOTEFT_GEN_A_CORE
Now s/p LHC via RFA with Dr. Rodríguez/Diomedes, tolerated procedure well. Pt arrived to recovery in NAD and HDS, RFA access site stable, no bleed/hematoma, distal pulse +,   Intraprocedural findings: severe pLCx stenosis --> [place] Cutting Balloon utilized, Laser atherectomy, Drug coated balloon  Medications:  P2Y12 inhibitor: N/A  Fentanyl: 100mcg IVP  Versed: 1mg IVP  Heparin: 10,000U  Omnipaque: 88cc  Closure Device: Failed Perclose --> manual hold in lab --> femstop applied  Post Cath EKG: SR 1st degree AVB, TWI in leads I and aVL (MO slightly prolonged, otherwise stable)    Reasons for Admission (if underwent PCI): EF < 20%    Plan:  -Formal cath report pending  -Post procedure management/monitoring per protocol  -Access site precautions  -HOB 90 degrees 2 hours post procedure  -Bedrest x 4 hours post procedure  -Labs and EKG in am  -NS 0.9% 50ml/hr x 1 bolus: post procedure RHONA ppx  -- volume reduced 2/2 low EF  -Repeat ECG if any clinical indication or change on tele  -Continue current medical therapy  -Dual anti platelet therapy with aspirin/brilinta **  -Cont BB with Toprol 75mg po daily **  -Cont statin therapy with Lipitor 40mg po qHS and Repatha  -Educated regarding strict adherence with DAPT   -Educated regarding post procedure management and care  -Discussed the importance of RF modification  -Cardiac rehab info provided/referral and communication to cardiac rehab completed  -F/U outpt in 1-2 weeks with Cardiologist Dr. Thomas  -DISPO: Plan for D/C in am if remains HDS, ECG and labs in am stable and without complications Now s/p LHC via RFA with Dr. Rodríguez/Diomedes, tolerated procedure well. Pt arrived to recovery in NAD and HDS, RFA access site stable (+ scar tissue/adipose felt in R groin stable from pre-procedure), no bleed/hematoma, distal pulse +,   Intraprocedural findings: severe pLCx stenosis --> [place] Cutting Balloon utilized, Laser atherectomy, Drug coated balloon  Medications:  P2Y12 inhibitor: N/A  Fentanyl: 100mcg IVP  Versed: 1mg IVP  Heparin: 10,000U  Omnipaque: 88cc  Closure Device: Failed Perclose --> manual hold in lab --> femstop applied  Post Cath EKG: SR 1st degree AVB, TWI in leads I and aVL (VA slightly prolonged, otherwise stable)    Reasons for Admission (if underwent PCI): EF < 20%    Plan:  -Formal cath report pending  -Post procedure management/monitoring per protocol  -Access site precautions  -HOB 90 degrees 2 hours post procedure  -Bedrest x 4 hours post procedure  -Labs and EKG in am  -NS 0.9% 50ml/hr x 1 bolus: post procedure RHONA ppx  -- volume reduced 2/2 low EF  -Repeat ECG if any clinical indication or change on tele  -Continue current medical therapy  -Dual anti platelet therapy with aspirin/brilinta **  -Cont BB with Toprol 75mg po daily **  -Cont statin therapy with Lipitor 40mg po qHS and Repatha  -Educated regarding strict adherence with DAPT   -Educated regarding post procedure management and care  -Discussed the importance of RF modification  -Cardiac rehab info provided/referral and communication to cardiac rehab completed  -F/U outpt in 1-2 weeks with Cardiologist Dr. Thomas  -DISPO: Plan for D/C in am if remains HDS, ECG and labs in am stable and without complications Now s/p LHC via RFA with Dr. Rodríguez/Diomedes, tolerated procedure well. Pt arrived to recovery in NAD and HDS, RFA access site stable (+ scar tissue/adipose felt in R groin stable from pre-procedure), no bleed/hematoma, distal pulse +,   Intraprocedural findings: severe pLCx stenosis --> [place] Cutting Balloon utilized, Laser atherectomy, Drug coated balloon  Medications:  P2Y12 inhibitor: N/A  Fentanyl: 100mcg IVP  Versed: 1mg IVP  Heparin: 10,000U  Omnipaque: 88cc  Closure Device: Failed Perclose --> manual hold in lab --> femstop applied (to be removed @ 1745)  Post Cath EKG: SR 1st degree AVB, TWI in leads I and aVL (NE slightly prolonged, otherwise stable)    Reasons for Admission (if underwent PCI): EF < 20%    Plan:  -Formal cath report pending  -Post procedure management/monitoring per protocol  -Access site precautions  -HOB 90 degrees 2 hours post procedure  -Bedrest x 4 hours post procedure  -Labs and EKG in am  -NS 0.9% 50ml/hr x 1 bolus: post procedure RHONA ppx  -- volume reduced 2/2 low EF  -Repeat ECG if any clinical indication or change on tele  -Continue current medical therapy  -Dual anti platelet therapy with aspirin/brilinta **  -Cont BB with Toprol 75mg po daily **  -Cont statin therapy with Lipitor 40mg po qHS and Repatha  -Educated regarding strict adherence with DAPT   -Educated regarding post procedure management and care  -Discussed the importance of RF modification  -Cardiac rehab info provided/referral and communication to cardiac rehab completed  -F/U outpt in 1-2 weeks with Cardiologist Dr. Thomas  -DISPO: Plan for D/C in am if remains HDS, ECG and labs in am stable and without complications

## 2024-09-25 NOTE — DISCHARGE NOTE PROVIDER - HOSPITAL COURSE
73 year old gentleman with DM II, HTN, HLD, and severe CAD s/p CABG (patent LIMA-LAD) and multiple PCI c/b ISR -- particularly of mid LMCA shaft, which was likely culprit behind recent STEMI presentation to [facility] ER warranting IABP placement, aspiration thrombectomy of LMCA and OM thrombi, and aggressive POBA of LMCA and POBA w/o additional layer of stent (Aug 10th, 2024) s/p perclose of LFA arteriotomy after IABP removal on Aug 12, 2024. Discharged Tuesday Aug 13th from [facility] with plan for high-complexity PCI of focal severe ISR at mid LMCA under the expertise of one of our [facility] interventional cardiologists Dr. Hercules  on 9/25/24.    Now s/p LHC via RFA with Dr. Rodríguez/Diomedes, tolerated procedure well. Pt arrived to recovery in NAD and HDS, RFA access site stable, no bleed/hematoma, distal pulse +,   Intraprocedural findings: severe pLCx stenosis --> [place] Cutting Balloon utilized, Laser atherectomy, Drug coated balloon utilized    Reasons for Admission (if underwent PCI): EF < 20%    Plan:  -Formal cath report pending  -Post procedure management/monitoring per protocol  -Access site precautions  -Continue current medical therapy  -Dual anti platelet therapy with aspirin/brilinta **  -Cont BB with Toprol 75mg po daily **  -Cont statin therapy with Lipitor 40mg po qHS and Repatha  -Educated regarding strict adherence with DAPT   -Educated regarding post procedure management and care  -Discussed the importance of RF modification  -Cardiac rehab info provided/referral and communication to cardiac rehab completed  -F/U outpt in 1-2 weeks with Cardiologist Dr. Thomas 73 year old gentleman with DM II, HTN, HLD, and severe CAD s/p CABG (patent LIMA-LAD) and multiple PCI c/b ISR -- particularly of mid LMCA shaft, which was likely culprit behind recent STEMI presentation to [facility] ER warranting IABP placement, aspiration thrombectomy of LMCA and OM thrombi, and aggressive POBA of LMCA and POBA w/o additional layer of stent (Aug 10th, 2024) s/p perclose of LFA arteriotomy after IABP removal on Aug 12, 2024. Discharged Tuesday Aug 13th from [facility] with plan for high-complexity PCI of focal severe ISR at mid LMCA under the expertise of one of our [facility] interventional cardiologists Dr. Hercules  on 9/25/24.    Now s/p LHC via RFA with Dr. Rodríguez/Diomedes, tolerated procedure well. Pt arrived to recovery in NAD and HDS, RFA access site stable (+ scar tissue/adipose felt in R groin stable from pre-procedure), no bleed/hematoma, distal pulse +,   Intraprocedural findings: severe pLCx stenosis --> [place] Cutting Balloon utilized, Laser atherectomy, Drug coated balloon utilized    Reasons for Admission (if underwent PCI): EF < 20%    Plan:  -Formal cath report pending  -Post procedure management/monitoring per protocol  -Access site precautions  -Continue current medical therapy  -Dual anti platelet therapy with aspirin/brilinta **  -Cont BB with Toprol 75mg po daily **  -Cont statin therapy with Lipitor 40mg po qHS and Repatha  -Educated regarding strict adherence with DAPT   -Educated regarding post procedure management and care  -Discussed the importance of RF modification  -Cardiac rehab info provided/referral and communication to cardiac rehab completed  -F/U outpt in 1-2 weeks with Cardiologist Dr. Thomas 73 year old gentleman with DM II, HTN, HLD, and severe CAD s/p CABG (patent LIMA-LAD) and multiple PCI c/b ISR -- particularly of mid LMCA shaft, which was likely culprit behind recent STEMI presentation to [facility] ER warranting IABP placement, aspiration thrombectomy of LMCA and OM thrombi, and aggressive POBA of LMCA and POBA w/o additional layer of stent (Aug 10th, 2024) s/p perclose of LFA arteriotomy after IABP removal on Aug 12, 2024. Discharged Tuesday Aug 13th from [facility] with plan for high-complexity PCI of focal severe ISR at mid LMCA under the expertise of one of our [facility] interventional cardiologists Dr. Hercules  on 9/25/24.    Now s/p LHC via RFA with Dr. Rodríguez/Diomedes, tolerated procedure well. Pt arrived to recovery in NAD and HDS, RFA access site stable (+ scar tissue/adipose felt in R groin stable from pre-procedure), no bleed/hematoma, distal pulse +,   Intraprocedural findings: severe pLCx stenosis --> [place] Cutting Balloon utilized, Laser atherectomy, Drug coated balloon utilized    Reasons for Admission (if underwent PCI): EF < 20%    Plan:  -Formal cath report pending  -Post procedure management/monitoring per protocol  -Access site precautions  -Continue current medical therapy  -Dual anti platelet therapy with aspirin/brilinta  -Cont BB with Toprol 75mg po daily   -Cont statin therapy with Lipitor 40mg po qHS and Repatha  -Educated regarding strict adherence with DAPT   -Educated regarding post procedure management and care  -Discussed the importance of RF modification  -Cardiac rehab info provided/referral and communication to cardiac rehab completed  -F/U outpt in 1-2 weeks with Cardiologist Dr. Thomas

## 2024-09-25 NOTE — DISCHARGE NOTE PROVIDER - NSDCCPCAREPLAN_GEN_ALL_CORE_FT
PRINCIPAL DISCHARGE DIAGNOSIS  Diagnosis: CAD (coronary artery disease)  Assessment and Plan of Treatment: Coronary artery disease is the buildup of plaque in the arteries that supply oxygen-rich blood to your heart. Plaque causes a narrowing or blockage that could result in a heart attack. Symptoms include chest pain or discomfort, shortness of breath, dizziness, palpitations, fatigue or reduced exercise tolerance. .  Go to the ED with any acute onset of chest pain, palpitations, shortness of breath or dizziness. Do NOT miss a dose or stop taking your Aspirin and Brilinta, these medications keep your stent open and prevent a heart attack. If anyone tells you to stop these medications, speak to your cardiologist immediately.  Managing risk factors will help keep your stent open and prevent future blockages, risk factors may include: high blood pressure, high cholesterol, diabetes, obesity, sedentary life style and smoking.    Your diet should be low in fat, cholesterol, salt and carbohydrates, increase fruits (caution if diabetic), vegetables and whole grains/fiber rich foods.   Take all your cardiac  medications as prescribed.    Exercise is a very important factor in heart health. Once your post procedure restrictions have passed, you should engage in heart healthy, aerobic exercise. Be sure to have clearance from your cardiologist. Cardiac rehab programs could be extremely beneficial and your cardiologist could help set this up.   Follow up with your cardiologist within 1-2 weeks after your procedure.   Call your cardiologist or our unit ([phone]) with any questions or concerns that may arise.

## 2024-09-25 NOTE — CHART NOTE - NSCHARTNOTEFT_GEN_A_CORE
Attempted to remove right groin fem stop at 645, patient again noted with bleeding at right groin site. Manual pressure applied x 10 minutes and UNINFLATED 0mmHg right groin fem stop applied x 35 more minutes.    Right groin fem stop removed at 730 pm. right groin site soft without active bleeding or groin hematoma. RLE remains acyanotic; warm to touch; motor/sensory function intact; distal pulses are present.     PLAN:  -Continue to closely monitor groin site for active bleeding or groin hematoma  -Please notify interventional cardiology team immediately if any active bleeding, hematoma, pain at right groin site or any change in color/temperature/sensation of RLE.  -HOB flat and RLE straight until 9 pm. If right groin site stable, ok to raise HOB to 30 degrees at 9pm  -bedrest x additional 4 hours. please keep bedrest til midnight. If right groin site stable, ok to ambulate at midnight.

## 2024-09-25 NOTE — H&P PST ADULT - HISTORY OF PRESENT ILLNESS
Department of Cardiology                                                                  [facility]/[address]                                                            Telephone: [phone]. Fax:[phone]                                                                                    [facility] H & P     Chief complaint:    Patient is a 73y old  Male who presents with a chief complaint of Coronary Artery Disease presenting for cardiac catheterization, PCI.    HPI: 73 year old gentleman with DM II, HTN, HLD, and severe CAD s/p CABG (patent LIMA-LAD) and multiple PCI c/b ISR -- particularly of mid LMCA shaft, which was likely culprit behind recent [place] [facility] [place]ABP placement, aspiration thrombectomy of LMCA and OM thrombi, and aggressive POBA of LMCA and POBA w/o additional layer of stent (Aug 10th, 2024) s/p perclose of LFA arteriotomy after IABP removal on Aug 12, 2024. [place] [facility] [place] for high-complexity PCI of focal severe ISR at mid LMCA under the expertise of one of our [facility] interventional cardiologists Dr. Hercules  on 9/25/24.    ?  08/21/24 ECG: SR with 1st deg AVB; IVCD. Rare PVC  08/11/24 TTE:  1. Left ventricular cavity is normal in size. Left ventricular systolic function is severely decreased with an ejection fraction visually estimated at <20 %.  2. Normal right ventricular cavity size and reduced right ventricular systolic function.  3. There is moderate (grade 2) left ventricular diastolic dysfunction.  4. Left atrium is mildly dilated.  5. The right atrium is normal in size.  6. Moderate to severe mitral regurgitation.  7. There is mild calcification of the aortic valve leaflets.  8. There is mild posterior calcification of the mitral valve annulus.  9. No echocardiographic evidence of pulmonary hypertension.  10. Estimated pulmonary artery systolic pressure is 31 mmHg.  11. The inferior vena cava is dilated measuring 2.15 cm in diameter, (dilated >2.1cm) with abnormal inspiratory collapse (abnormal <50%) consistent with elevated right atrial pressure (, range 10-20mmHg).  ?      Symptoms:        Angina (Class):        Ischemic Symptoms:     Heart Failure:        Systolic/Diastolic/Combined:        NYHA Class (within 2 weeks):     Assessment of LVEF:       EF: <20%       Assessed by: TTE       Date: 8/11/24    Prior Cardiac Interventions:  Prior EP Procedures:    Prior IC Procedures:  08/10/24 Cath:  Subtotal thrombotic ISR of LMCA and OM2 s/p aspiration thrombectomy and IVUS-guided POBA w/o JAVIER. Full resolution of CP. Successful 50 cm IABP placement via L femoral arterial access.  ?    Prior CTS: coronary artery bypass grafting LIMA-LAD         Antianginal Therapies:        Beta Blockers:  metoprolol       Calcium Channel Blockers:        Long Acting Nitrates:        Ranexa:     Associated Risk Factors:        Cerebrovascular Disease: N/A       Chronic Lung Disease: N/A       Peripheral Arterial Disease: N/A       Chronic Kidney Disease (if yes, what is GFR): N/A       Uncontrolled Diabetes (if yes, what is HgbA1C or FBS): N/A       Poorly Controlled Hypertension (if yes, what is SBP): N/A       Morbid Obesity (if yes, what is BMI): N/A       History of Recent Ventricular Arrhythmia: N/A       Inability to Ambulate Safely: N/A       Need for Therapeutic Anticoagulation: N/A       Antiplatelet or Contrast Allergy: N/A             Antiarrhythmic Therapies:      Anticoagulation Therapies:      	  ROS: as stated above, otherwise negative      	    LABS:	 	                                                                           Department of Cardiology                                                                  [facility]/[address]-[address]                                                            Telephone: [phone]. Fax:[phone]                                                                                    [facility] & P     Chief complaint:    Patient is a 73y old  Male who presents with a chief complaint of Coronary Artery Disease presenting for cardiac catheterization, PCI.    HPI: 73 year old gentleman with DM II, HTN, HLD, and severe CAD s/p CABG (patent LIMA-LAD) and multiple PCI c/b ISR -- particularly of mid LMCA shaft, which was likely culprit behind recent [place] [facility] IABP placement, aspiration thrombectomy of LMCA and OM thrombi, and aggressive POBA of LMCA and POBA w/o additional layer of stent (Aug 10th, 2024) s/p perclose of LFA arteriotomy after IABP removal on Aug 12, 2024. [place] [facility] [place] for high-complexity PCI of focal severe ISR at mid LMCA under the expertise of one of our [facility] interventional cardiologists Dr. Hercules  on 9/25/24.    08/21/24 ECG: SR with 1st deg AVB; IVCD. Rare PVC  08/11/24 TTE:  1. Left ventricular cavity is normal in size. Left ventricular systolic function is severely decreased with an ejection fraction visually estimated at <20 %.  2. Normal right ventricular cavity size and reduced right ventricular systolic function.  3. There is moderate (grade 2) left ventricular diastolic dysfunction.  4. Left atrium is mildly dilated.  5. The right atrium is normal in size.  6. Moderate to severe mitral regurgitation.  7. There is mild calcification of the aortic valve leaflets.  8. There is mild posterior calcification of the mitral valve annulus.  9. No echocardiographic evidence of pulmonary hypertension.  10. Estimated pulmonary artery systolic pressure is 31 mmHg.  11. The inferior vena cava is dilated measuring 2.15 cm in diameter, (dilated >2.1cm) with abnormal inspiratory collapse (abnormal <50%) consistent with elevated right atrial pressure (, range 10-20mmHg).  ?  Symptoms:        Angina (Class):        Ischemic Symptoms:     Heart Failure: Yes       Systolic/Diastolic/Combined: Systolic       NYHA Class (within 2 weeks):     Assessment of LVEF:       EF: <20%       Assessed by: TTE       Date: 8/11/24    Prior Cardiac Interventions:  Prior EP Procedures:    Prior IC Procedures:  08/10/24 Cath:  Subtotal thrombotic ISR of LMCA and OM2 s/p aspiration thrombectomy and IVUS-guided POBA w/o JAVIER. Full resolution of CP. Successful 50 cm IABP placement via L femoral arterial access.  ?  Prior CTS: coronary artery bypass grafting LIMA-LAD    Antianginal Therapies:        Beta Blockers:  Toprol 75mg PO QD       Calcium Channel Blockers:        Long Acting Nitrates:        Ranexa:     Associated Risk Factors:        Cerebrovascular Disease: N/A       Chronic Lung Disease: N/A       Peripheral Arterial Disease: N/A       Chronic Kidney Disease (if yes, what is GFR): N/A       Uncontrolled Diabetes (if yes, what is HgbA1C or FBS): N/A       Poorly Controlled Hypertension (if yes, what is SBP): N/A       Morbid Obesity (if yes, what is BMI): N/A       History of Recent Ventricular Arrhythmia: N/A       Inability to Ambulate Safely: N/A       Need for Therapeutic Anticoagulation: N/A       Antiplatelet or Contrast Allergy: N/A    Antiarrhythmic Therapies:      Anticoagulation Therapies:      	  ROS: as stated above, otherwise negative    LABS:	 	                                                                           Department of Cardiology                                                                  [facility]/[address]-[address]                                                            Telephone: [phone]. Fax:[phone]                                                                                    [facility] H & P     Chief complaint:    Patient is a 73y old  Male who presents with a chief complaint of Coronary Artery Disease presenting for cardiac catheterization, PCI.    HPI: 73 year old gentleman with DM II, HTN, HLD, and severe CAD s/p CABG (patent LIMA-LAD) and multiple PCI c/b ISR -- particularly of mid LMCA shaft, which was likely culprit behind recent STEMI presentation to [facility] [place] warranting IABP placement, aspiration thrombectomy of LMCA and OM thrombi, and aggressive POBA of LMCA and POBA w/o additional layer of stent (Aug 10th, 2024) s/p perclose of LFA arteriotomy after IABP removal on Aug 12, 2024. Discharged Tuesday Aug 13th from [facility] [place] with plan for high-complexity PCI of focal severe ISR at mid LMCA under the expertise of one of our [facility] interventional cardiologists Dr. Hercules  on 9/25/24.    08/21/24 ECG: SR with 1st deg AVB; IVCD. Rare PVC  08/11/24 TTE:  1. Left ventricular cavity is normal in size. Left ventricular systolic function is severely decreased with an ejection fraction visually estimated at <20 %.  2. Normal right ventricular cavity size and reduced right ventricular systolic function.  3. There is moderate (grade 2) left ventricular diastolic dysfunction.  4. Left atrium is mildly dilated.  5. The right atrium is normal in size.  6. Moderate to severe mitral regurgitation.  7. There is mild calcification of the aortic valve leaflets.  8. There is mild posterior calcification of the mitral valve annulus.  9. No echocardiographic evidence of pulmonary hypertension.  10. Estimated pulmonary artery systolic pressure is 31 mmHg.  11. The inferior vena cava is dilated measuring 2.15 cm in diameter, (dilated >2.1cm) with abnormal inspiratory collapse (abnormal <50%) consistent with elevated right atrial pressure (, range 10-20mmHg).  ?  Symptoms: N/A       Angina (Class): N/A       Ischemic Symptoms: N/A    Heart Failure: Yes       Systolic/Diastolic/Combined: Systolic       NYHA Class (within 2 weeks):     Assessment of LVEF:       EF: <20%       Assessed by: TTE       Date: 8/11/24    Prior Cardiac Interventions: CABG SANCHEZ to LAD  Prior EP Procedures:    Prior IC Procedures:  08/10/24 Cath:  Subtotal thrombotic ISR of LMCA and OM2 s/p aspiration thrombectomy and IVUS-guided POBA w/o JAVIER. Full resolution of CP. Successful 50 cm IABP placement via L femoral arterial access.  ?  Prior CTS: coronary artery bypass grafting LIMA-LAD    Antianginal Therapies:        Beta Blockers:  Toprol 75mg PO QD       Calcium Channel Blockers:        Long Acting Nitrates:        Ranexa:     Associated Risk Factors:        Cerebrovascular Disease: N/A       Chronic Lung Disease: N/A       Peripheral Arterial Disease: N/A       Chronic Kidney Disease (if yes, what is GFR): N/A       Uncontrolled Diabetes (if yes, what is HgbA1C or FBS): N/A       Poorly Controlled Hypertension (if yes, what is SBP): N/A       Morbid Obesity (if yes, what is BMI): N/A       History of Recent Ventricular Arrhythmia: N/A       Inability to Ambulate Safely: N/A       Need for Therapeutic Anticoagulation: N/A       Antiplatelet or Contrast Allergy: N/A    Antiarrhythmic Therapies:      Anticoagulation Therapies:      	  ROS: as stated above, otherwise negative    LABS:	 	                                                                           Department of Cardiology                                                                  [facility]/[address]-[address]                                                            Telephone: [phone]. Fax:[phone]                                                                                    [facility] H & P     Chief complaint:    Patient is a 73y old  Male who presents with a chief complaint of Coronary Artery Disease presenting for cardiac catheterization, PCI.    HPI: 73 year old gentleman with DM II, HTN, HLD, and severe CAD s/p CABG (patent LIMA-LAD) and multiple PCI c/b ISR -- particularly of mid LMCA shaft, which was likely culprit behind recent STEMI presentation to [facility] [place] warranting IABP placement, aspiration thrombectomy of LMCA and OM thrombi, and aggressive POBA of LMCA and POBA w/o additional layer of stent (Aug 10th, 2024) s/p perclose of LFA arteriotomy after IABP removal on Aug 12, 2024. Discharged Tuesday Aug 13th from [facility] [place] with plan for high-complexity PCI of focal severe ISR at mid LMCA under the expertise of one of our [facility] interventional cardiologists Dr. Hercules  on 9/25/24.    08/21/24 ECG: SR with 1st deg AVB; IVCD. Rare PVC  08/11/24 TTE:  1. Left ventricular cavity is normal in size. Left ventricular systolic function is severely decreased with an ejection fraction visually estimated at <20 %.  2. Normal right ventricular cavity size and reduced right ventricular systolic function.  3. There is moderate (grade 2) left ventricular diastolic dysfunction.  4. Left atrium is mildly dilated.  5. The right atrium is normal in size.  6. Moderate to severe mitral regurgitation.  7. There is mild calcification of the aortic valve leaflets.  8. There is mild posterior calcification of the mitral valve annulus.  9. No echocardiographic evidence of pulmonary hypertension.  10. Estimated pulmonary artery systolic pressure is 31 mmHg.  11. The inferior vena cava is dilated measuring 2.15 cm in diameter, (dilated >2.1cm) with abnormal inspiratory collapse (abnormal <50%) consistent with elevated right atrial pressure (, range 10-20mmHg).  ?  Symptoms: N/A       Angina (Class): N/A       Ischemic Symptoms: N/A    Heart Failure: Yes       Systolic/Diastolic/Combined: Systolic       NYHA Class (within 2 weeks):     Assessment of LVEF:       EF: <20%       Assessed by: TTE       Date: 8/11/24    Prior Cardiac Interventions: CABG SANCHEZ to LAD  Prior EP Procedures:    Prior IC Procedures:  08/10/24 Cath:  Subtotal thrombotic ISR of LMCA and OM2 s/p aspiration thrombectomy and IVUS-guided POBA w/o JAVIER. Full resolution of CP. Successful 50 cm IABP placement via L femoral arterial access.    Conclusions:     1. Subtotal thrombotic in-stent stenosis of LMCA and OM2 s/p aspiration thrombectomy and IVUS-guided POBA. No JAVIER. Improved appearance. Full resolution of chest pain.    2.. Successful 50 cm IABP placement via left femoral arterial access     Recommendations:     1. Continue DAPT, high-intensity statin.    2. Continue heparin gtt @ 1000 U / hr for IABP    3. Continue integrillin gtt @ 2 mcg/kg/min x 18 hours.    4. Determine patient's candidacy for drug-coated balloon therapy for  multilayer ISR and accordingly arrange transfer.  Prior CTS: coronary artery bypass grafting LIMA-LAD    Antianginal Therapies:        Beta Blockers:  Toprol 75mg PO QD       Calcium Channel Blockers:        Long Acting Nitrates:        Ranexa:     Associated Risk Factors:        Cerebrovascular Disease: N/A       Chronic Lung Disease: N/A       Peripheral Arterial Disease: N/A       Chronic Kidney Disease (if yes, what is GFR): N/A       Uncontrolled Diabetes (if yes, what is HgbA1C or FBS): N/A       Poorly Controlled Hypertension (if yes, what is SBP): N/A       Morbid Obesity (if yes, what is BMI): N/A       History of Recent Ventricular Arrhythmia: N/A       Inability to Ambulate Safely: N/A       Need for Therapeutic Anticoagulation: N/A       Antiplatelet or Contrast Allergy: N/A    PHYSICAL EXAM:  Constitutional: Comfortable . No acute distress.   HEENT: Atraumatic and normocephalic , neck is supple . no JVD. No carotid bruit.  CNS: A&Ox3. No focal deficits.   Respiratory: CTAB, unlabored   Cardiovascular: RRR normal s1 s2. No murmur. No rubs or gallop.  Gastrointestinal: Soft, non-tender. +Bowel sounds.   Extremities: 2+ Peripheral Pulses, No clubbing, cyanosis, or edema  Psychiatric: Calm . no agitation.   Skin: Warm and dry, no ulcers on extremities     Antiarrhythmic Therapies:      Anticoagulation Therapies:      	  ROS: as stated above, otherwise negative    LABS:	 	                                                                           Department of Cardiology                                                                  [facility]/[address]-[address]                                                            Telephone: [phone]. Fax:[phone]                                                                                    [facility] H & P     Chief complaint:    Patient is a 73y old  Male who presents with a chief complaint of Coronary Artery Disease presenting for cardiac catheterization, PCI.    HPI: 73 year old gentleman with DM II, HTN, HLD, and severe CAD s/p CABG (patent LIMA-LAD) and multiple PCI c/b ISR -- particularly of mid LMCA shaft, which was likely culprit behind recent STEMI presentation to [facility] ER warranting IABP placement, aspiration thrombectomy of LMCA and OM thrombi, and aggressive POBA of LMCA and POBA w/o additional layer of stent (Aug 10th, 2024) s/p perclose of LFA arteriotomy after IABP removal on Aug 12, 2024. Discharged Tuesday Aug 13th from [facility] with plan for high-complexity PCI of focal severe ISR at mid LMCA under the expertise of one of our [facility] interventional cardiologists Dr. Hercules  on 9/25/24.    Symptoms: N/A       Angina (Class): N/A       Ischemic Symptoms: N/A    Heart Failure: Yes       Systolic/Diastolic/Combined: Systolic       NYHA Class (within 2 weeks):     Assessment of LVEF:       EF: <20%       Assessed by: TTE       Date: 8/11/24    EKG: SR 94bpm, TWI in leads I, aVL concerning for lateral ischemia    08/11/24 TTE:  1. Left ventricular cavity is normal in size. Left ventricular systolic function is severely decreased with an ejection fraction visually estimated at <20 %.  2. Normal right ventricular cavity size and reduced right ventricular systolic function.  3. There is moderate (grade 2) left ventricular diastolic dysfunction.  4. Left atrium is mildly dilated.  5. The right atrium is normal in size.  6. Moderate to severe mitral regurgitation.  7. There is mild calcification of the aortic valve leaflets.  8. There is mild posterior calcification of the mitral valve annulus.  9. No echocardiographic evidence of pulmonary hypertension.  10. Estimated pulmonary artery systolic pressure is 31 mmHg.  11. The inferior vena cava is dilated measuring 2.15 cm in diameter, (dilated >2.1cm) with abnormal inspiratory collapse (abnormal <50%) consistent with elevated right atrial pressure (, range 10-20mmHg).    Prior Cardiac Interventions: CABG SANCHEZ to LAD  Prior EP Procedures:    Prior IC Procedures:  08/10/24 Cath:  Subtotal thrombotic ISR of LMCA and OM2 s/p aspiration thrombectomy and IVUS-guided POBA w/o JAVIER. Full resolution of CP. Successful 50 cm IABP placement via L femoral arterial access.    Conclusions:     1. Subtotal thrombotic in-stent stenosis of LMCA and OM2 s/p aspiration thrombectomy and IVUS-guided POBA. No JAVIER. Improved appearance. Full resolution of chest pain.    2.. Successful 50 cm IABP placement via left femoral arterial access     Recommendations:     1. Continue DAPT, high-intensity statin.    2. Continue heparin gtt @ 1000 U / hr for IABP    3. Continue integrillin gtt @ 2 mcg/kg/min x 18 hours.    4. Determine patient's candidacy for drug-coated balloon therapy for  multilayer ISR and accordingly arrange transfer.  Prior CTS: coronary artery bypass grafting LIMA-LAD    Antianginal Therapies:        Beta Blockers:  Toprol 75mg PO QD       Calcium Channel Blockers:        Long Acting Nitrates:        Ranexa:     Associated Risk Factors:        Cerebrovascular Disease: N/A       Chronic Lung Disease: N/A       Peripheral Arterial Disease: N/A       Chronic Kidney Disease (if yes, what is GFR): N/A       Uncontrolled Diabetes (if yes, what is HgbA1C or FBS): N/A       Poorly Controlled Hypertension (if yes, what is SBP): N/A       Morbid Obesity (if yes, what is BMI): N/A       History of Recent Ventricular Arrhythmia: N/A       Inability to Ambulate Safely: N/A       Need for Therapeutic Anticoagulation: N/A       Antiplatelet or Contrast Allergy: N/A    PHYSICAL EXAM:  Constitutional: Comfortable . No acute distress.   HEENT: Atraumatic and normocephalic , neck is supple . no JVD. No carotid bruit.  CNS: A&Ox3. No focal deficits.   Respiratory: CTAB, unlabored   Cardiovascular: RRR normal s1 s2. No murmur. No rubs or gallop.  Gastrointestinal: Soft, non-tender. +Bowel sounds.   Extremities: 2+ Peripheral Pulses, No clubbing, cyanosis, or edema  Psychiatric: Calm . no agitation.   Skin: Warm and dry, no ulcers on extremities     Antiarrhythmic Therapies:      Anticoagulation Therapies:      	  ROS: as stated above, otherwise negative    LABS:	 	                        15.0   5.68  )-----------( 200      ( 25 Sep 2024 13:09 )             46.1     140  |  101  |  15.5  ----------------------------<  82  4.3   |  27.0  |  0.81    Ca    9.2      25 Sep 2024 13:09  Mg     1.9     09-25    TPro  7.5  /  Alb  4.3  /  TBili  1.1  /  DBili  x   /  AST  31  /  ALT  31  /  AlkPhos  95  09-25                                                                         Department of Cardiology                                                                  [facility]/[address]-[address]                                                            Telephone: [phone]. Fax:[phone]                                                                                    [facility] H & P     Chief complaint:    Patient is a 73y old  Male who presents with a chief complaint of Coronary Artery Disease presenting for cardiac catheterization, PCI.    HPI: 73 year old gentleman with DM II, HTN, HLD, and severe CAD s/p CABG (patent LIMA-LAD) and multiple PCI c/b ISR -- particularly of mid LMCA shaft, which was likely culprit behind recent STEMI presentation to [facility] [place] warranting IABP placement, aspiration thrombectomy of LMCA and OM thrombi, and aggressive POBA of LMCA and POBA w/o additional layer of stent (Aug 10th, 2024) s/p perclose of LFA arteriotomy after IABP removal on Aug 12, 2024. Discharged Tuesday Aug 13th [place] [facility] [place] with plan for high-complexity PCI of focal severe ISR at mid LMCA under the expertise of one of our [facility] interventional cardiologists Dr. Hercules  on 9/25/24.    Symptoms: N/A       Angina (Class): N/A       Ischemic Symptoms: N/A    Heart Failure: Yes       Systolic/Diastolic/Combined: Systolic       NYHA Class (within 2 weeks):     Assessment of LVEF:       EF: <20%       Assessed by: TTE       Date: 8/11/24    EKG: SR 94bpm, TWI in leads I, aVL concerning for lateral ischemia    08/11/24 TTE:  1. Left ventricular cavity is normal in size. Left ventricular systolic function is severely decreased with an ejection fraction visually estimated at <20 %.  2. Normal right ventricular cavity size and reduced right ventricular systolic function.  3. There is moderate (grade 2) left ventricular diastolic dysfunction.  4. Left atrium is mildly dilated.  5. The right atrium is normal in size.  6. Moderate to severe mitral regurgitation.  7. There is mild calcification of the aortic valve leaflets.  8. There is mild posterior calcification of the mitral valve annulus.  9. No echocardiographic evidence of pulmonary hypertension.  10. Estimated pulmonary artery systolic pressure is 31 mmHg.  11. The inferior vena cava is dilated measuring 2.15 cm in diameter, (dilated >2.1cm) with abnormal inspiratory collapse (abnormal <50%) consistent with elevated right atrial pressure (, range 10-20mmHg).    Prior Cardiac Interventions: CABG SANCHEZ to LAD  Prior EP Procedures:    Prior IC Procedures:  08/10/24 Cath:  Subtotal thrombotic ISR of LMCA and OM2 s/p aspiration thrombectomy and IVUS-guided POBA w/o JAVIER. Full resolution of CP. Successful 50 cm IABP placement via L femoral arterial access.    Conclusions:     1. Subtotal thrombotic in-stent stenosis of LMCA and OM2 s/p aspiration thrombectomy and IVUS-guided POBA. No JAVIER. Improved appearance. Full resolution of chest pain.    2.. Successful 50 cm IABP placement via left femoral arterial access     Recommendations:     1. Continue DAPT, high-intensity statin.    2. Continue heparin gtt @ 1000 U / hr for IABP    3. Continue integrillin gtt @ 2 mcg/kg/min x 18 hours.    4. Determine patient's candidacy for drug-coated balloon therapy for  multilayer ISR and accordingly arrange transfer.  Prior CTS: coronary artery bypass grafting [facility]    Antianginal Therapies:        Beta Blockers:  Toprol 75mg PO QD       Calcium Channel Blockers:        Long Acting Nitrates:        Ranexa:     Associated Risk Factors:        Cerebrovascular Disease: N/A       Chronic Lung Disease: N/A       Peripheral Arterial Disease: N/A       Chronic Kidney Disease (if yes, what is GFR): N/A       Uncontrolled Diabetes (if yes, what is HgbA1C or FBS): N/A       Poorly Controlled Hypertension (if yes, what is SBP): N/A       Morbid Obesity (if yes, what is BMI): N/A       History of Recent Ventricular Arrhythmia: N/A       Inability to Ambulate Safely: N/A       Need for Therapeutic Anticoagulation: N/A       Antiplatelet or Contrast Allergy: N/A    PHYSICAL EXAM:  Constitutional: Comfortable . No acute distress.   HEENT: Atraumatic and normocephalic , neck is supple . no JVD. No carotid bruit.  CNS: A&Ox3. No focal deficits.   Respiratory: CTAB, unlabored   Cardiovascular: RRR normal s1 s2. No murmur. No rubs or gallop.  Gastrointestinal: Soft, non-tender. +Bowel sounds.   Extremities: 2+ Peripheral Pulses, No clubbing, cyanosis, or edema. + scar tissue/adipose felt in R groin.  Psychiatric: Calm . no agitation.   Skin: Warm and dry, no ulcers on extremities     Antiarrhythmic Therapies:      Anticoagulation Therapies:      	  ROS: as stated above, otherwise negative    LABS:	 	                        15.0   5.68  )-----------( 200      ( 25 Sep 2024 13:09 )             46.1     140  |  101  |  15.5  ----------------------------<  82  4.3   |  27.0  |  0.81    Ca    9.2      25 Sep 2024 13:09  Mg     1.9     09-25    TPro  7.5  /  Alb  4.3  /  TBili  1.1  /  DBili  x   /  AST  31  /  ALT  31  /  AlkPhos  95  09-25

## 2024-09-26 ENCOUNTER — TRANSCRIPTION ENCOUNTER (OUTPATIENT)
Age: 74
End: 2024-09-26

## 2024-09-26 VITALS
OXYGEN SATURATION: 94 % | RESPIRATION RATE: 16 BRPM | TEMPERATURE: 98 F | DIASTOLIC BLOOD PRESSURE: 62 MMHG | SYSTOLIC BLOOD PRESSURE: 105 MMHG | HEART RATE: 90 BPM

## 2024-09-26 LAB
A1C WITH ESTIMATED AVERAGE GLUCOSE RESULT: 6.2 % — HIGH (ref 4–5.6)
ALBUMIN SERPL ELPH-MCNC: 3.6 G/DL — SIGNIFICANT CHANGE UP (ref 3.3–5.2)
ALP SERPL-CCNC: 84 U/L — SIGNIFICANT CHANGE UP (ref 40–120)
ALT FLD-CCNC: 33 U/L — SIGNIFICANT CHANGE UP
ANION GAP SERPL CALC-SCNC: 9 MMOL/L — SIGNIFICANT CHANGE UP (ref 5–17)
AST SERPL-CCNC: 36 U/L — SIGNIFICANT CHANGE UP
BASOPHILS # BLD AUTO: 0.03 K/UL — SIGNIFICANT CHANGE UP (ref 0–0.2)
BASOPHILS NFR BLD AUTO: 0.6 % — SIGNIFICANT CHANGE UP (ref 0–2)
BILIRUB SERPL-MCNC: 1 MG/DL — SIGNIFICANT CHANGE UP (ref 0.4–2)
BUN SERPL-MCNC: 15.6 MG/DL — SIGNIFICANT CHANGE UP (ref 8–20)
CALCIUM SERPL-MCNC: 8.4 MG/DL — SIGNIFICANT CHANGE UP (ref 8.4–10.5)
CHLORIDE SERPL-SCNC: 106 MMOL/L — SIGNIFICANT CHANGE UP (ref 96–108)
CO2 SERPL-SCNC: 24 MMOL/L — SIGNIFICANT CHANGE UP (ref 22–29)
CREAT SERPL-MCNC: 0.67 MG/DL — SIGNIFICANT CHANGE UP (ref 0.5–1.3)
EGFR: 100 ML/MIN/1.73M2 — SIGNIFICANT CHANGE UP
EOSINOPHIL # BLD AUTO: 0.12 K/UL — SIGNIFICANT CHANGE UP (ref 0–0.5)
EOSINOPHIL NFR BLD AUTO: 2.3 % — SIGNIFICANT CHANGE UP (ref 0–6)
ESTIMATED AVERAGE GLUCOSE: 131 MG/DL — HIGH (ref 68–114)
GLUCOSE BLDC GLUCOMTR-MCNC: 107 MG/DL — HIGH (ref 70–99)
GLUCOSE BLDC GLUCOMTR-MCNC: 107 MG/DL — HIGH (ref 70–99)
GLUCOSE SERPL-MCNC: 129 MG/DL — HIGH (ref 70–99)
HCT VFR BLD CALC: 40.7 % — SIGNIFICANT CHANGE UP (ref 39–50)
HGB BLD-MCNC: 13.5 G/DL — SIGNIFICANT CHANGE UP (ref 13–17)
IMM GRANULOCYTES NFR BLD AUTO: 0.4 % — SIGNIFICANT CHANGE UP (ref 0–0.9)
LYMPHOCYTES # BLD AUTO: 0.77 K/UL — LOW (ref 1–3.3)
LYMPHOCYTES # BLD AUTO: 14.6 % — SIGNIFICANT CHANGE UP (ref 13–44)
MCHC RBC-ENTMCNC: 31.3 PG — SIGNIFICANT CHANGE UP (ref 27–34)
MCHC RBC-ENTMCNC: 33.2 GM/DL — SIGNIFICANT CHANGE UP (ref 32–36)
MCV RBC AUTO: 94.4 FL — SIGNIFICANT CHANGE UP (ref 80–100)
MONOCYTES # BLD AUTO: 0.62 K/UL — SIGNIFICANT CHANGE UP (ref 0–0.9)
MONOCYTES NFR BLD AUTO: 11.8 % — SIGNIFICANT CHANGE UP (ref 2–14)
NEUTROPHILS # BLD AUTO: 3.71 K/UL — SIGNIFICANT CHANGE UP (ref 1.8–7.4)
NEUTROPHILS NFR BLD AUTO: 70.3 % — SIGNIFICANT CHANGE UP (ref 43–77)
PLATELET # BLD AUTO: 164 K/UL — SIGNIFICANT CHANGE UP (ref 150–400)
POTASSIUM SERPL-MCNC: 4.3 MMOL/L — SIGNIFICANT CHANGE UP (ref 3.5–5.3)
POTASSIUM SERPL-SCNC: 4.3 MMOL/L — SIGNIFICANT CHANGE UP (ref 3.5–5.3)
PROT SERPL-MCNC: 6.3 G/DL — LOW (ref 6.6–8.7)
RBC # BLD: 4.31 M/UL — SIGNIFICANT CHANGE UP (ref 4.2–5.8)
RBC # FLD: 14.7 % — HIGH (ref 10.3–14.5)
SODIUM SERPL-SCNC: 139 MMOL/L — SIGNIFICANT CHANGE UP (ref 135–145)
WBC # BLD: 5.27 K/UL — SIGNIFICANT CHANGE UP (ref 3.8–10.5)
WBC # FLD AUTO: 5.27 K/UL — SIGNIFICANT CHANGE UP (ref 3.8–10.5)

## 2024-09-26 PROCEDURE — C1753: CPT

## 2024-09-26 PROCEDURE — 93010 ELECTROCARDIOGRAM REPORT: CPT

## 2024-09-26 PROCEDURE — 83735 ASSAY OF MAGNESIUM: CPT

## 2024-09-26 PROCEDURE — 93005 ELECTROCARDIOGRAM TRACING: CPT

## 2024-09-26 PROCEDURE — C1894: CPT

## 2024-09-26 PROCEDURE — C2623: CPT

## 2024-09-26 PROCEDURE — 85025 COMPLETE CBC W/AUTO DIFF WBC: CPT

## 2024-09-26 PROCEDURE — 36415 COLL VENOUS BLD VENIPUNCTURE: CPT

## 2024-09-26 PROCEDURE — C1760: CPT

## 2024-09-26 PROCEDURE — C1769: CPT

## 2024-09-26 PROCEDURE — C1885: CPT

## 2024-09-26 PROCEDURE — 83036 HEMOGLOBIN GLYCOSYLATED A1C: CPT

## 2024-09-26 PROCEDURE — 92924 PRQ TRLUML C ATHRC 1 ART&/BR: CPT | Mod: LC

## 2024-09-26 PROCEDURE — 92978 ENDOLUMINL IVUS OCT C 1ST: CPT | Mod: LC

## 2024-09-26 PROCEDURE — 82962 GLUCOSE BLOOD TEST: CPT

## 2024-09-26 PROCEDURE — C1887: CPT

## 2024-09-26 PROCEDURE — C1725: CPT

## 2024-09-26 PROCEDURE — 80053 COMPREHEN METABOLIC PANEL: CPT

## 2024-09-26 RX ADMIN — TICAGRELOR 90 MILLIGRAM(S): 60 TABLET ORAL at 10:09

## 2024-09-26 RX ADMIN — Medication 81 MILLIGRAM(S): at 10:09

## 2024-09-26 RX ADMIN — SACUBITRIL AND VALSARTAN 1 TABLET(S): 97; 103 TABLET, FILM COATED ORAL at 10:08

## 2024-09-26 RX ADMIN — EZETIMIBE 10 MILLIGRAM(S): 10 TABLET ORAL at 10:08

## 2024-09-26 RX ADMIN — Medication 75 MILLIGRAM(S): at 10:09

## 2024-09-26 NOTE — PROGRESS NOTE ADULT - SUBJECTIVE AND OBJECTIVE BOX
Subjective:  73y  Male severe pLCx stenosis --> [place] Cutting Balloon utilized, Laser atherectomy, Drug coated balloon, patient awake and alert without complaints. Right groin site benign, +ecchymosis. OOB and ambulating without complaints. Denies chest pain, sob, palps.      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 Thomas - OM1      S/P angioplasty  KELLY POBA 10/15/19        FAMILY HISTORY:  FH: myocardial infarction      MEDICATIONS  (STANDING):  aspirin enteric coated 81 milliGRAM(s) Oral daily  atorvastatin 40 milliGRAM(s) Oral at bedtime  chlorhexidine 4% Liquid 1 Application(s) Topical once  dextrose 5%. 1000 milliLiter(s) (100 mL/Hr) IV Continuous <Continuous>  dextrose 5%. 1000 milliLiter(s) (50 mL/Hr) IV Continuous <Continuous>  dextrose 50% Injectable 25 Gram(s) IV Push once  dextrose 50% Injectable 25 Gram(s) IV Push once  dextrose 50% Injectable 12.5 Gram(s) IV Push once  ezetimibe 10 milliGRAM(s) Oral daily  glucagon  Injectable 1 milliGRAM(s) IntraMuscular once  insulin lispro (ADMELOG) corrective regimen sliding scale   SubCutaneous at bedtime  insulin lispro (ADMELOG) corrective regimen sliding scale   SubCutaneous three times a day before meals  metoprolol succinate ER 75 milliGRAM(s) Oral daily  sacubitril 24 mG/valsartan 26 mG 1 Tablet(s) Oral two times a day  sodium chloride 0.9% Bolus 50 milliLiter(s) IV Bolus once  sodium chloride 0.9% Bolus 50 milliLiter(s) IV Bolus once  ticagrelor 90 milliGRAM(s) Oral every 12 hours    MEDICATIONS  (PRN):  dextrose Oral Gel 15 Gram(s) Oral once PRN Blood Glucose LESS THAN 70 milliGRAM(s)/deciliter    EKG: SR no acute st changes        PHYSICAL EXAM:  Constitutional: Comfortable . No acute distress.   HEENT: Atraumatic and normocephalic , neck is supple . no JVD. No carotid bruit.  CNS: A&Ox3. No focal deficits.   Respiratory: CTAB, unlabored   Cardiovascular: RRR normal s1 s2. No murmur. No rubs or gallop.  Gastrointestinal: Soft, non-tender. +Bowel sounds.   Extremities: 2+ Peripheral Pulses, No clubbing, cyanosis, or edema. + scar tissue/adipose felt in R groin.  Psychiatric: Calm . no agitation.   Skin: Warm and dry, no ulcers on extremities     Antiarrhythmic Therapies:      Anticoagulation Therapies:      	  ROS: as stated above, otherwise negative    LABS:	 	                        15.0   5.68  )-----------( 200      ( 25 Sep 2024 13:09 )             46.1     140  |  101  |  15.5  ----------------------------<  82  4.3   |  27.0  |  0.81    Ca    9.2      25 Sep 2024 13:09  Mg     1.9     09-25    TPro  7.5  /  Alb  4.3  /  TBili  1.1  /  DBili  x   /  AST  31  /  ALT  31  /  AlkPhos  95  09-25 (25 Sep 2024 12:12)    General: No fatigue, no fevers/chills  Respiratory: No dyspnea, no cough, no wheeze  CV: No chest pain, no palpitations  Abd: No nausea  Neuro: No headache, no dizziness  No Known Allergies      Objective:  Vital Signs Last 24 Hrs  T(C): 36.6 (26 Sep 2024 08:17), Max: 36.9 (26 Sep 2024 03:30)  T(F): 97.9 (26 Sep 2024 08:17), Max: 98.4 (26 Sep 2024 03:30)  HR: 85 (26 Sep 2024 08:17) (66 - 94)  BP: 122/74 (26 Sep 2024 08:17) (96/72 - 145/83)  BP(mean): 96 (26 Sep 2024 05:51) (90 - 96)  RR: 18 (26 Sep 2024 08:17) (11 - 18)  SpO2: 96% (26 Sep 2024 08:17) (94% - 100%)    Parameters below as of 26 Sep 2024 08:17  Patient On (Oxygen Delivery Method): room air                              13.5   5.27  )-----------( 164      ( 26 Sep 2024 05:46 )             40.7     09-26    139  |  106  |  15.6  ----------------------------<  129[H]  4.3   |  24.0  |  0.67    Ca    8.4      26 Sep 2024 05:46  Mg     1.9     09-25    TPro  6.3[L]  /  Alb  3.6  /  TBili  1.0  /  DBili  x   /  AST  36  /  ALT  33  /  AlkPhos  84  09-26        A/P:  CAD s/p PCI: severe pLCx stenosis --> [place] Cutting Balloon utilized, Laser atherectomy, Drug coated balloon  1.                 Groin management discussed with patient.  2.                 AM labs/EKG reviewed and stable.    3.                 Pt given instructions on importance of taking antiplatelet medication.    4.                 Aspirin/Brilinta/Statin/BB  5.                 Follow up with cardiologist in 2 weeks or sooner if needed  6.                 cardiac rehab info provided/referral and communication to cardiac rehab completed   7.                 Lifestyle modification including diet and exercise discussed with patient and verbalizes understanding  8.                 Stable for discharge

## 2024-09-26 NOTE — DISCHARGE NOTE NURSING/CASE MANAGEMENT/SOCIAL WORK - PATIENT PORTAL LINK FT
You can access the FollowMyHealth Patient Portal offered by Health system by registering at the following website: [email or web address]. By joining [employer]’s FollowMyHealth portal, you will also be able to view your health information using other applications (apps) compatible with our system.

## 2024-09-26 NOTE — DISCHARGE NOTE NURSING/CASE MANAGEMENT/SOCIAL WORK - NSDCPEFALRISK_GEN_ALL_CORE
For information on Fall & Injury Prevention, visit: [email or web address] OR  [email or web address] OR  https://www.cdc.gov/steadi/patient.html

## 2024-09-27 ENCOUNTER — TRANSCRIPTION ENCOUNTER (OUTPATIENT)
Age: 74
End: 2024-09-27

## 2024-10-09 ENCOUNTER — TRANSCRIPTION ENCOUNTER (OUTPATIENT)
Age: 74
End: 2024-10-09

## 2024-10-10 ENCOUNTER — APPOINTMENT (OUTPATIENT)
Dept: CARDIOLOGY | Facility: CLINIC | Age: 74
End: 2024-10-10
Payer: MEDICARE

## 2024-10-10 VITALS
WEIGHT: 200 LBS | DIASTOLIC BLOOD PRESSURE: 56 MMHG | HEART RATE: 95 BPM | BODY MASS INDEX: 30.41 KG/M2 | SYSTOLIC BLOOD PRESSURE: 110 MMHG | OXYGEN SATURATION: 96 %

## 2024-10-10 DIAGNOSIS — I21.4 NON-ST ELEVATION (NSTEMI) MYOCARDIAL INFARCTION: ICD-10-CM

## 2024-10-10 DIAGNOSIS — I25.10 ATHEROSCLEROTIC HEART DISEASE OF NATIVE CORONARY ARTERY W/OUT ANGINA PECTORIS: ICD-10-CM

## 2024-10-10 DIAGNOSIS — I10 ESSENTIAL (PRIMARY) HYPERTENSION: ICD-10-CM

## 2024-10-10 DIAGNOSIS — Z92.3 PERSONAL HISTORY OF IRRADIATION: ICD-10-CM

## 2024-10-10 DIAGNOSIS — Z95.1 PRESENCE OF AORTOCORONARY BYPASS GRAFT: ICD-10-CM

## 2024-10-10 DIAGNOSIS — E78.00 PURE HYPERCHOLESTEROLEMIA, UNSPECIFIED: ICD-10-CM

## 2024-10-10 DIAGNOSIS — Z95.5 PRESENCE OF CORONARY ANGIOPLASTY IMPLANT AND GRAFT: ICD-10-CM

## 2024-10-10 DIAGNOSIS — E11.9 TYPE 2 DIABETES MELLITUS W/OUT COMPLICATIONS: ICD-10-CM

## 2024-10-10 PROCEDURE — G2211 COMPLEX E/M VISIT ADD ON: CPT

## 2024-10-10 PROCEDURE — 99215 OFFICE O/P EST HI 40 MIN: CPT

## 2024-11-19 ENCOUNTER — APPOINTMENT (OUTPATIENT)
Dept: CARDIOLOGY | Facility: CLINIC | Age: 74
End: 2024-11-19
Payer: MEDICARE

## 2024-11-19 PROCEDURE — 93306 TTE W/DOPPLER COMPLETE: CPT

## 2024-11-27 ENCOUNTER — APPOINTMENT (OUTPATIENT)
Dept: CARDIOLOGY | Facility: CLINIC | Age: 74
End: 2024-11-27
Payer: MEDICARE

## 2024-11-27 VITALS
OXYGEN SATURATION: 95 % | WEIGHT: 200 LBS | HEART RATE: 90 BPM | SYSTOLIC BLOOD PRESSURE: 102 MMHG | BODY MASS INDEX: 30.41 KG/M2 | DIASTOLIC BLOOD PRESSURE: 62 MMHG

## 2024-11-27 DIAGNOSIS — I10 ESSENTIAL (PRIMARY) HYPERTENSION: ICD-10-CM

## 2024-11-27 DIAGNOSIS — I21.4 NON-ST ELEVATION (NSTEMI) MYOCARDIAL INFARCTION: ICD-10-CM

## 2024-11-27 DIAGNOSIS — Z95.5 PRESENCE OF CORONARY ANGIOPLASTY IMPLANT AND GRAFT: ICD-10-CM

## 2024-11-27 DIAGNOSIS — Z92.3 PERSONAL HISTORY OF IRRADIATION: ICD-10-CM

## 2024-11-27 DIAGNOSIS — R06.00 DYSPNEA, UNSPECIFIED: ICD-10-CM

## 2024-11-27 DIAGNOSIS — I25.10 ATHEROSCLEROTIC HEART DISEASE OF NATIVE CORONARY ARTERY W/OUT ANGINA PECTORIS: ICD-10-CM

## 2024-11-27 DIAGNOSIS — I25.5 ISCHEMIC CARDIOMYOPATHY: ICD-10-CM

## 2024-11-27 DIAGNOSIS — Z95.1 PRESENCE OF AORTOCORONARY BYPASS GRAFT: ICD-10-CM

## 2024-11-27 DIAGNOSIS — E78.00 PURE HYPERCHOLESTEROLEMIA, UNSPECIFIED: ICD-10-CM

## 2024-11-27 PROCEDURE — G2211 COMPLEX E/M VISIT ADD ON: CPT

## 2024-11-27 PROCEDURE — 99215 OFFICE O/P EST HI 40 MIN: CPT

## 2024-11-29 ENCOUNTER — RX RENEWAL (OUTPATIENT)
Age: 74
End: 2024-11-29

## 2024-11-30 ENCOUNTER — RX RENEWAL (OUTPATIENT)
Age: 74
End: 2024-11-30

## 2025-02-04 ENCOUNTER — APPOINTMENT (OUTPATIENT)
Dept: CARDIOLOGY | Facility: CLINIC | Age: 75
End: 2025-02-04
Payer: MEDICARE

## 2025-02-04 PROCEDURE — 93306 TTE W/DOPPLER COMPLETE: CPT

## 2025-02-12 ENCOUNTER — NON-APPOINTMENT (OUTPATIENT)
Age: 75
End: 2025-02-12

## 2025-02-12 ENCOUNTER — APPOINTMENT (OUTPATIENT)
Dept: CARDIOLOGY | Facility: CLINIC | Age: 75
End: 2025-02-12
Payer: MEDICARE

## 2025-02-12 VITALS
OXYGEN SATURATION: 98 % | WEIGHT: 200 LBS | HEIGHT: 68 IN | DIASTOLIC BLOOD PRESSURE: 72 MMHG | HEART RATE: 98 BPM | SYSTOLIC BLOOD PRESSURE: 126 MMHG | BODY MASS INDEX: 30.31 KG/M2

## 2025-02-12 DIAGNOSIS — Z79.899 OTHER LONG TERM (CURRENT) DRUG THERAPY: ICD-10-CM

## 2025-02-12 DIAGNOSIS — I10 ESSENTIAL (PRIMARY) HYPERTENSION: ICD-10-CM

## 2025-02-12 DIAGNOSIS — E11.9 TYPE 2 DIABETES MELLITUS W/OUT COMPLICATIONS: ICD-10-CM

## 2025-02-12 DIAGNOSIS — Z92.3 PERSONAL HISTORY OF IRRADIATION: ICD-10-CM

## 2025-02-12 DIAGNOSIS — E78.00 PURE HYPERCHOLESTEROLEMIA, UNSPECIFIED: ICD-10-CM

## 2025-02-12 DIAGNOSIS — Z95.5 PRESENCE OF CORONARY ANGIOPLASTY IMPLANT AND GRAFT: ICD-10-CM

## 2025-02-12 DIAGNOSIS — I21.4 NON-ST ELEVATION (NSTEMI) MYOCARDIAL INFARCTION: ICD-10-CM

## 2025-02-12 PROCEDURE — 99215 OFFICE O/P EST HI 40 MIN: CPT

## 2025-02-12 PROCEDURE — 93000 ELECTROCARDIOGRAM COMPLETE: CPT

## 2025-02-12 PROCEDURE — G2211 COMPLEX E/M VISIT ADD ON: CPT

## 2025-02-14 ENCOUNTER — APPOINTMENT (OUTPATIENT)
Dept: CARDIOLOGY | Facility: CLINIC | Age: 75
End: 2025-02-14
Payer: MEDICARE

## 2025-02-14 VITALS
HEART RATE: 100 BPM | OXYGEN SATURATION: 97 % | BODY MASS INDEX: 30.31 KG/M2 | WEIGHT: 200 LBS | HEIGHT: 68 IN | DIASTOLIC BLOOD PRESSURE: 60 MMHG | SYSTOLIC BLOOD PRESSURE: 132 MMHG

## 2025-02-14 DIAGNOSIS — I25.5 ISCHEMIC CARDIOMYOPATHY: ICD-10-CM

## 2025-02-14 DIAGNOSIS — Z95.1 PRESENCE OF AORTOCORONARY BYPASS GRAFT: ICD-10-CM

## 2025-02-14 DIAGNOSIS — I25.10 ATHEROSCLEROTIC HEART DISEASE OF NATIVE CORONARY ARTERY W/OUT ANGINA PECTORIS: ICD-10-CM

## 2025-02-14 PROCEDURE — 99215 OFFICE O/P EST HI 40 MIN: CPT

## 2025-02-14 RX ORDER — SPIRONOLACTONE 25 MG/1
25 TABLET ORAL DAILY
Qty: 90 | Refills: 1 | Status: ACTIVE | COMMUNITY
Start: 2025-02-14 | End: 1900-01-01

## 2025-02-19 ENCOUNTER — RESULT REVIEW (OUTPATIENT)
Age: 75
End: 2025-02-19

## 2025-02-19 ENCOUNTER — APPOINTMENT (OUTPATIENT)
Dept: MRI IMAGING | Facility: CLINIC | Age: 75
End: 2025-02-19

## 2025-02-19 ENCOUNTER — OUTPATIENT (OUTPATIENT)
Dept: OUTPATIENT SERVICES | Facility: HOSPITAL | Age: 75
LOS: 1 days | End: 2025-02-19
Payer: MEDICARE

## 2025-02-19 DIAGNOSIS — Z95.1 PRESENCE OF AORTOCORONARY BYPASS GRAFT: Chronic | ICD-10-CM

## 2025-02-19 DIAGNOSIS — Z95.1 PRESENCE OF AORTOCORONARY BYPASS GRAFT: ICD-10-CM

## 2025-02-19 DIAGNOSIS — Z95.820 PERIPHERAL VASCULAR ANGIOPLASTY STATUS WITH IMPLANTS AND GRAFTS: Chronic | ICD-10-CM

## 2025-02-19 DIAGNOSIS — Z98.62 PERIPHERAL VASCULAR ANGIOPLASTY STATUS: Chronic | ICD-10-CM

## 2025-02-19 PROCEDURE — 75565 CARD MRI VELOC FLOW MAPPING: CPT | Mod: 26

## 2025-02-19 PROCEDURE — 75561 CARDIAC MRI FOR MORPH W/DYE: CPT | Mod: 26

## 2025-02-28 ENCOUNTER — LABORATORY RESULT (OUTPATIENT)
Age: 75
End: 2025-02-28

## 2025-03-14 ENCOUNTER — APPOINTMENT (OUTPATIENT)
Dept: CARDIOLOGY | Facility: CLINIC | Age: 75
End: 2025-03-14
Payer: MEDICARE

## 2025-03-14 VITALS
OXYGEN SATURATION: 98 % | BODY MASS INDEX: 30.31 KG/M2 | HEIGHT: 68 IN | WEIGHT: 200 LBS | HEART RATE: 86 BPM | SYSTOLIC BLOOD PRESSURE: 128 MMHG | DIASTOLIC BLOOD PRESSURE: 74 MMHG

## 2025-03-14 DIAGNOSIS — I25.10 ATHEROSCLEROTIC HEART DISEASE OF NATIVE CORONARY ARTERY W/OUT ANGINA PECTORIS: ICD-10-CM

## 2025-03-14 DIAGNOSIS — Z95.1 PRESENCE OF AORTOCORONARY BYPASS GRAFT: ICD-10-CM

## 2025-03-14 DIAGNOSIS — I25.5 ISCHEMIC CARDIOMYOPATHY: ICD-10-CM

## 2025-03-14 PROCEDURE — G2211 COMPLEX E/M VISIT ADD ON: CPT

## 2025-03-14 PROCEDURE — 99215 OFFICE O/P EST HI 40 MIN: CPT

## 2025-04-18 ENCOUNTER — RX RENEWAL (OUTPATIENT)
Age: 75
End: 2025-04-18

## 2025-06-09 ENCOUNTER — APPOINTMENT (OUTPATIENT)
Dept: CARDIOLOGY | Facility: CLINIC | Age: 75
End: 2025-06-09
Payer: MEDICARE

## 2025-06-09 PROCEDURE — 93308 TTE F-UP OR LMTD: CPT

## 2025-06-09 PROCEDURE — 93321 DOPPLER ECHO F-UP/LMTD STD: CPT

## 2025-06-09 PROCEDURE — 93325 DOPPLER ECHO COLOR FLOW MAPG: CPT

## 2025-06-16 ENCOUNTER — APPOINTMENT (OUTPATIENT)
Dept: CARDIOLOGY | Facility: CLINIC | Age: 75
End: 2025-06-16

## 2025-06-26 ENCOUNTER — APPOINTMENT (OUTPATIENT)
Dept: CARDIOLOGY | Facility: CLINIC | Age: 75
End: 2025-06-26
Payer: MEDICARE

## 2025-06-26 VITALS
WEIGHT: 208 LBS | BODY MASS INDEX: 31.52 KG/M2 | SYSTOLIC BLOOD PRESSURE: 130 MMHG | HEIGHT: 68 IN | OXYGEN SATURATION: 98 % | HEART RATE: 88 BPM | DIASTOLIC BLOOD PRESSURE: 72 MMHG

## 2025-06-26 PROCEDURE — 99215 OFFICE O/P EST HI 40 MIN: CPT

## 2025-06-26 PROCEDURE — G2211 COMPLEX E/M VISIT ADD ON: CPT

## 2025-06-26 RX ORDER — EPLERENONE 50 MG/1
50 TABLET, COATED ORAL
Qty: 1 | Refills: 2 | Status: ACTIVE | COMMUNITY
Start: 2025-06-26 | End: 1900-01-01

## 2025-07-08 LAB — HBA1C MFR BLD HPLC: 6.2

## 2025-07-11 ENCOUNTER — LABORATORY RESULT (OUTPATIENT)
Age: 75
End: 2025-07-11

## 2025-07-11 ENCOUNTER — NON-APPOINTMENT (OUTPATIENT)
Age: 75
End: 2025-07-11

## 2025-07-16 ENCOUNTER — APPOINTMENT (OUTPATIENT)
Dept: CARDIOLOGY | Facility: CLINIC | Age: 75
End: 2025-07-16
Payer: MEDICARE

## 2025-07-16 ENCOUNTER — NON-APPOINTMENT (OUTPATIENT)
Age: 75
End: 2025-07-16

## 2025-07-16 VITALS
HEIGHT: 68 IN | OXYGEN SATURATION: 95 % | HEART RATE: 100 BPM | BODY MASS INDEX: 32.13 KG/M2 | WEIGHT: 212 LBS | SYSTOLIC BLOOD PRESSURE: 116 MMHG | DIASTOLIC BLOOD PRESSURE: 20 MMHG

## 2025-07-16 PROBLEM — I50.20 HEART FAILURE WITH REDUCED EJECTION FRACTION (HFREF, <= 40%): Status: ACTIVE | Noted: 2025-06-26

## 2025-07-16 PROCEDURE — 99215 OFFICE O/P EST HI 40 MIN: CPT

## 2025-07-16 PROCEDURE — 93000 ELECTROCARDIOGRAM COMPLETE: CPT

## 2025-07-16 PROCEDURE — G2211 COMPLEX E/M VISIT ADD ON: CPT

## 2025-08-14 ENCOUNTER — APPOINTMENT (OUTPATIENT)
Dept: CARDIOLOGY | Facility: CLINIC | Age: 75
End: 2025-08-14
Payer: MEDICARE

## 2025-08-14 VITALS
OXYGEN SATURATION: 97 % | DIASTOLIC BLOOD PRESSURE: 54 MMHG | BODY MASS INDEX: 31.37 KG/M2 | WEIGHT: 207 LBS | SYSTOLIC BLOOD PRESSURE: 114 MMHG | HEIGHT: 68 IN | HEART RATE: 76 BPM

## 2025-08-14 DIAGNOSIS — E11.9 TYPE 2 DIABETES MELLITUS W/OUT COMPLICATIONS: ICD-10-CM

## 2025-08-14 DIAGNOSIS — I25.5 ISCHEMIC CARDIOMYOPATHY: ICD-10-CM

## 2025-08-14 DIAGNOSIS — E78.00 PURE HYPERCHOLESTEROLEMIA, UNSPECIFIED: ICD-10-CM

## 2025-08-14 DIAGNOSIS — Z95.1 PRESENCE OF AORTOCORONARY BYPASS GRAFT: ICD-10-CM

## 2025-08-14 DIAGNOSIS — Z95.5 PRESENCE OF CORONARY ANGIOPLASTY IMPLANT AND GRAFT: ICD-10-CM

## 2025-08-14 DIAGNOSIS — I10 ESSENTIAL (PRIMARY) HYPERTENSION: ICD-10-CM

## 2025-08-14 DIAGNOSIS — I25.110 ATHEROSCLEROTIC HEART DISEASE OF NATIVE CORONARY ARTERY WITH UNSTABLE ANGINA PECTORIS: ICD-10-CM

## 2025-08-14 DIAGNOSIS — I25.10 ATHEROSCLEROTIC HEART DISEASE OF NATIVE CORONARY ARTERY W/OUT ANGINA PECTORIS: ICD-10-CM

## 2025-08-14 DIAGNOSIS — I21.4 NON-ST ELEVATION (NSTEMI) MYOCARDIAL INFARCTION: ICD-10-CM

## 2025-08-14 PROCEDURE — 99215 OFFICE O/P EST HI 40 MIN: CPT

## 2025-08-14 PROCEDURE — G2211 COMPLEX E/M VISIT ADD ON: CPT
